# Patient Record
Sex: FEMALE | Race: WHITE | NOT HISPANIC OR LATINO | Employment: OTHER | URBAN - METROPOLITAN AREA
[De-identification: names, ages, dates, MRNs, and addresses within clinical notes are randomized per-mention and may not be internally consistent; named-entity substitution may affect disease eponyms.]

---

## 2023-02-03 PROBLEM — Z00.00 ROUTINE ADULT HEALTH MAINTENANCE: Status: ACTIVE | Noted: 2023-02-03

## 2023-02-03 PROBLEM — M25.542 PAIN OF JOINT OF BOTH HANDS: Status: ACTIVE | Noted: 2023-02-03

## 2023-02-03 PROBLEM — Q45.3 ABNORMALITY OF PANCREATIC DUCT: Status: ACTIVE | Noted: 2023-02-03

## 2023-02-03 PROBLEM — L71.9 ROSACEA: Status: ACTIVE | Noted: 2023-02-03

## 2023-02-03 PROBLEM — Z12.39 SCREENING FOR BREAST CANCER: Status: ACTIVE | Noted: 2023-02-03

## 2023-02-03 PROBLEM — Z13.820 SCREENING FOR OSTEOPOROSIS: Status: ACTIVE | Noted: 2023-02-03

## 2023-02-03 PROBLEM — Z71.89 CARDIAC RISK COUNSELING: Status: ACTIVE | Noted: 2023-02-03

## 2023-02-03 PROBLEM — E78.2 HYPERLIPIDEMIA, MIXED: Status: ACTIVE | Noted: 2023-02-03

## 2023-02-03 PROBLEM — R91.1 LUNG NODULE: Status: ACTIVE | Noted: 2023-02-03

## 2023-02-03 PROBLEM — Z01.419 ENCOUNTER FOR ROUTINE GYNECOLOGIC EXAMINATION IN MEDICARE PATIENT: Status: ACTIVE | Noted: 2023-02-03

## 2023-02-03 PROBLEM — M79.641 PAIN OF RIGHT HAND: Status: ACTIVE | Noted: 2023-02-03

## 2023-02-03 PROBLEM — Z87.81 H/O FRACTURE OF RIB: Status: ACTIVE | Noted: 2023-02-03

## 2023-02-03 PROBLEM — F41.9 ANXIETY: Status: ACTIVE | Noted: 2023-02-03

## 2023-02-03 PROBLEM — Z13.89 SCREENING FOR MULTIPLE CONDITIONS: Status: ACTIVE | Noted: 2023-02-03

## 2023-02-03 PROBLEM — R09.89 PULMONARY HYPERINFLATION: Status: ACTIVE | Noted: 2023-02-03

## 2023-02-03 PROBLEM — M19.90 ARTHRITIS: Status: ACTIVE | Noted: 2023-02-03

## 2023-02-03 PROBLEM — L98.9 CHANGING SKIN LESION: Status: ACTIVE | Noted: 2023-02-03

## 2023-02-03 PROBLEM — M25.50 POLYARTHRALGIA: Status: ACTIVE | Noted: 2023-02-03

## 2023-02-03 PROBLEM — M25.541 PAIN OF JOINT OF BOTH HANDS: Status: ACTIVE | Noted: 2023-02-03

## 2023-02-03 PROBLEM — M77.11 LATERAL EPICONDYLITIS OF BOTH ELBOWS: Status: ACTIVE | Noted: 2023-02-03

## 2023-02-03 PROBLEM — M79.642 PAIN OF LEFT HAND: Status: ACTIVE | Noted: 2023-02-03

## 2023-02-03 PROBLEM — E55.9 VITAMIN D DEFICIENCY: Status: ACTIVE | Noted: 2023-02-03

## 2023-02-03 PROBLEM — Z78.0 POSTMENOPAUSAL: Status: ACTIVE | Noted: 2023-02-03

## 2023-02-03 PROBLEM — F32.1 CURRENT MODERATE EPISODE OF MAJOR DEPRESSIVE DISORDER WITHOUT PRIOR EPISODE (MULTI): Status: ACTIVE | Noted: 2023-02-03

## 2023-02-03 PROBLEM — Z12.11 SCREENING FOR COLORECTAL CANCER: Status: ACTIVE | Noted: 2023-02-03

## 2023-02-03 PROBLEM — D18.03 HEPATIC HEMANGIOMA: Status: ACTIVE | Noted: 2023-02-03

## 2023-02-03 PROBLEM — Z87.42 H/O ABNORMAL CERVICAL PAPANICOLAOU SMEAR: Status: ACTIVE | Noted: 2023-02-03

## 2023-02-03 PROBLEM — B35.1 ONYCHOMYCOSIS OF TOENAIL: Status: ACTIVE | Noted: 2023-02-03

## 2023-02-03 PROBLEM — E03.9 HYPOTHYROIDISM, ADULT: Status: ACTIVE | Noted: 2023-02-03

## 2023-02-03 PROBLEM — Z12.12 SCREENING FOR COLORECTAL CANCER: Status: ACTIVE | Noted: 2023-02-03

## 2023-02-03 PROBLEM — Z71.89 ADVANCE DIRECTIVE DISCUSSED WITH PATIENT: Status: ACTIVE | Noted: 2023-02-03

## 2023-02-03 PROBLEM — M77.12 LATERAL EPICONDYLITIS OF BOTH ELBOWS: Status: ACTIVE | Noted: 2023-02-03

## 2023-02-03 PROBLEM — M85.9 DISORDER OF BONE DENSITY AND STRUCTURE, UNSPECIFIED: Status: ACTIVE | Noted: 2023-02-03

## 2023-02-03 RX ORDER — SIMVASTATIN 40 MG/1
1 TABLET, FILM COATED ORAL DAILY
COMMUNITY
Start: 2020-06-25 | End: 2024-01-17

## 2023-02-03 RX ORDER — LEVOTHYROXINE SODIUM 50 UG/1
1 TABLET ORAL DAILY
COMMUNITY
Start: 2022-03-22 | End: 2024-01-17

## 2023-02-03 RX ORDER — MELOXICAM 15 MG/1
1 TABLET ORAL DAILY
COMMUNITY
Start: 2022-03-22 | End: 2023-08-23

## 2023-02-03 RX ORDER — BUSPIRONE HYDROCHLORIDE 15 MG/1
1 TABLET ORAL 2 TIMES DAILY
COMMUNITY
Start: 2021-03-16 | End: 2023-04-07 | Stop reason: ALTCHOICE

## 2023-02-03 RX ORDER — CHOLECALCIFEROL (VITAMIN D3) 25 MCG
1 TABLET ORAL DAILY
COMMUNITY
Start: 2020-06-25

## 2023-02-03 RX ORDER — BUPROPION HYDROCHLORIDE 300 MG/1
1 TABLET ORAL DAILY
COMMUNITY
Start: 2021-03-16 | End: 2023-04-07 | Stop reason: ALTCHOICE

## 2023-02-03 RX ORDER — MULTIVIT WITH MINERALS/HERBS
1 TABLET ORAL
COMMUNITY
Start: 2022-03-22 | End: 2023-12-21 | Stop reason: SINTOL

## 2023-02-03 RX ORDER — SERTRALINE HYDROCHLORIDE 100 MG/1
100 TABLET, FILM COATED ORAL DAILY
COMMUNITY
Start: 2020-06-25 | End: 2023-05-18 | Stop reason: ALTCHOICE

## 2023-02-19 PROBLEM — Z71.89 ADVANCED DIRECTIVES, COUNSELING/DISCUSSION: Status: ACTIVE | Noted: 2023-02-19

## 2023-02-19 PROBLEM — Z01.419 ENCOUNTER FOR ROUTINE GYNECOLOGIC EXAMINATION IN MEDICARE PATIENT: Status: RESOLVED | Noted: 2023-02-03 | Resolved: 2023-02-19

## 2023-02-19 PROBLEM — M25.542 PAIN OF JOINT OF BOTH HANDS: Status: RESOLVED | Noted: 2023-02-03 | Resolved: 2023-02-19

## 2023-02-19 PROBLEM — M79.642 PAIN OF LEFT HAND: Status: RESOLVED | Noted: 2023-02-03 | Resolved: 2023-02-19

## 2023-02-19 PROBLEM — M79.641 PAIN OF RIGHT HAND: Status: RESOLVED | Noted: 2023-02-03 | Resolved: 2023-02-19

## 2023-02-19 PROBLEM — L98.9 CHANGING SKIN LESION: Status: RESOLVED | Noted: 2023-02-03 | Resolved: 2023-02-19

## 2023-02-19 PROBLEM — M25.541 PAIN OF JOINT OF BOTH HANDS: Status: RESOLVED | Noted: 2023-02-03 | Resolved: 2023-02-19

## 2023-03-28 LAB
ALANINE AMINOTRANSFERASE (SGPT) (U/L) IN SER/PLAS: 12 U/L (ref 7–45)
ALBUMIN (G/DL) IN SER/PLAS: 4.3 G/DL (ref 3.4–5)
ALKALINE PHOSPHATASE (U/L) IN SER/PLAS: 64 U/L (ref 33–136)
ANION GAP IN SER/PLAS: 9 MMOL/L (ref 10–20)
APPEARANCE, URINE: CLEAR
ASPARTATE AMINOTRANSFERASE (SGOT) (U/L) IN SER/PLAS: 18 U/L (ref 9–39)
BASOPHILS (10*3/UL) IN BLOOD BY AUTOMATED COUNT: 0.06 X10E9/L (ref 0–0.1)
BASOPHILS/100 LEUKOCYTES IN BLOOD BY AUTOMATED COUNT: 1.4 % (ref 0–2)
BILIRUBIN TOTAL (MG/DL) IN SER/PLAS: 0.5 MG/DL (ref 0–1.2)
BILIRUBIN, URINE: NEGATIVE
BLOOD, URINE: NEGATIVE
CALCIDIOL (25 OH VITAMIN D3) (NG/ML) IN SER/PLAS: 39 NG/ML
CALCIUM (MG/DL) IN SER/PLAS: 9.5 MG/DL (ref 8.6–10.3)
CARBON DIOXIDE, TOTAL (MMOL/L) IN SER/PLAS: 31 MMOL/L (ref 21–32)
CHLORIDE (MMOL/L) IN SER/PLAS: 105 MMOL/L (ref 98–107)
CHOLESTEROL (MG/DL) IN SER/PLAS: 182 MG/DL (ref 0–199)
CHOLESTEROL IN HDL (MG/DL) IN SER/PLAS: 76.8 MG/DL
CHOLESTEROL/HDL RATIO: 2.4
COLOR, URINE: YELLOW
CREATININE (MG/DL) IN SER/PLAS: 0.79 MG/DL (ref 0.5–1.05)
EOSINOPHILS (10*3/UL) IN BLOOD BY AUTOMATED COUNT: 0.09 X10E9/L (ref 0–0.7)
EOSINOPHILS/100 LEUKOCYTES IN BLOOD BY AUTOMATED COUNT: 2 % (ref 0–6)
ERYTHROCYTE DISTRIBUTION WIDTH (RATIO) BY AUTOMATED COUNT: 13.2 % (ref 11.5–14.5)
ERYTHROCYTE MEAN CORPUSCULAR HEMOGLOBIN CONCENTRATION (G/DL) BY AUTOMATED: 32.1 G/DL (ref 32–36)
ERYTHROCYTE MEAN CORPUSCULAR VOLUME (FL) BY AUTOMATED COUNT: 93 FL (ref 80–100)
ERYTHROCYTES (10*6/UL) IN BLOOD BY AUTOMATED COUNT: 4.44 X10E12/L (ref 4–5.2)
GFR FEMALE: 82 ML/MIN/1.73M2
GLUCOSE (MG/DL) IN SER/PLAS: 87 MG/DL (ref 74–99)
GLUCOSE, URINE: NEGATIVE MG/DL
HEMATOCRIT (%) IN BLOOD BY AUTOMATED COUNT: 41.4 % (ref 36–46)
HEMOGLOBIN (G/DL) IN BLOOD: 13.3 G/DL (ref 12–16)
IMMATURE GRANULOCYTES/100 LEUKOCYTES IN BLOOD BY AUTOMATED COUNT: 0.2 % (ref 0–0.9)
KETONES, URINE: NEGATIVE MG/DL
LDL: 90 MG/DL (ref 0–99)
LEUKOCYTE ESTERASE, URINE: NEGATIVE
LEUKOCYTES (10*3/UL) IN BLOOD BY AUTOMATED COUNT: 4.4 X10E9/L (ref 4.4–11.3)
LYMPHOCYTES (10*3/UL) IN BLOOD BY AUTOMATED COUNT: 2.03 X10E9/L (ref 1.2–4.8)
LYMPHOCYTES/100 LEUKOCYTES IN BLOOD BY AUTOMATED COUNT: 46.1 % (ref 13–44)
MONOCYTES (10*3/UL) IN BLOOD BY AUTOMATED COUNT: 0.33 X10E9/L (ref 0.1–1)
MONOCYTES/100 LEUKOCYTES IN BLOOD BY AUTOMATED COUNT: 7.5 % (ref 2–10)
NEUTROPHILS (10*3/UL) IN BLOOD BY AUTOMATED COUNT: 1.88 X10E9/L (ref 1.2–7.7)
NEUTROPHILS/100 LEUKOCYTES IN BLOOD BY AUTOMATED COUNT: 42.8 % (ref 40–80)
NITRITE, URINE: NEGATIVE
PH, URINE: 7 (ref 5–8)
PLATELETS (10*3/UL) IN BLOOD AUTOMATED COUNT: 159 X10E9/L (ref 150–450)
POTASSIUM (MMOL/L) IN SER/PLAS: 4 MMOL/L (ref 3.5–5.3)
PROTEIN TOTAL: 7 G/DL (ref 6.4–8.2)
PROTEIN, URINE: NEGATIVE MG/DL
SODIUM (MMOL/L) IN SER/PLAS: 141 MMOL/L (ref 136–145)
SPECIFIC GRAVITY, URINE: 1.02 (ref 1–1.03)
THYROTROPIN (MIU/L) IN SER/PLAS BY DETECTION LIMIT <= 0.05 MIU/L: 1.7 MIU/L (ref 0.44–3.98)
TRIGLYCERIDE (MG/DL) IN SER/PLAS: 76 MG/DL (ref 0–149)
UREA NITROGEN (MG/DL) IN SER/PLAS: 15 MG/DL (ref 6–23)
UROBILINOGEN, URINE: <2 MG/DL (ref 0–1.9)
VLDL: 15 MG/DL (ref 0–40)

## 2023-04-07 ENCOUNTER — OFFICE VISIT (OUTPATIENT)
Dept: PRIMARY CARE | Facility: CLINIC | Age: 68
End: 2023-04-07
Payer: COMMERCIAL

## 2023-04-07 VITALS
SYSTOLIC BLOOD PRESSURE: 115 MMHG | HEART RATE: 70 BPM | DIASTOLIC BLOOD PRESSURE: 70 MMHG | TEMPERATURE: 97.3 F | BODY MASS INDEX: 22.5 KG/M2 | WEIGHT: 127 LBS | OXYGEN SATURATION: 98 % | HEIGHT: 63 IN

## 2023-04-07 DIAGNOSIS — E55.9 VITAMIN D DEFICIENCY: ICD-10-CM

## 2023-04-07 DIAGNOSIS — L57.0 ACTINIC KERATOSIS: ICD-10-CM

## 2023-04-07 DIAGNOSIS — R91.1 LUNG NODULE: ICD-10-CM

## 2023-04-07 DIAGNOSIS — J02.9 SORE THROAT: ICD-10-CM

## 2023-04-07 DIAGNOSIS — Z12.31 ENCOUNTER FOR SCREENING MAMMOGRAM FOR BREAST CANCER: ICD-10-CM

## 2023-04-07 DIAGNOSIS — Z78.0 POSTMENOPAUSAL: ICD-10-CM

## 2023-04-07 DIAGNOSIS — Z71.89 CARDIAC RISK COUNSELING: ICD-10-CM

## 2023-04-07 DIAGNOSIS — Z12.31 ENCOUNTER FOR SCREENING MAMMOGRAM FOR MALIGNANT NEOPLASM OF BREAST: ICD-10-CM

## 2023-04-07 DIAGNOSIS — Z13.89 SCREENING FOR MULTIPLE CONDITIONS: ICD-10-CM

## 2023-04-07 DIAGNOSIS — Z00.00 ROUTINE ADULT HEALTH MAINTENANCE: Primary | ICD-10-CM

## 2023-04-07 DIAGNOSIS — E03.9 HYPOTHYROIDISM, ADULT: ICD-10-CM

## 2023-04-07 DIAGNOSIS — Z71.89 ADVANCE DIRECTIVE DISCUSSED WITH PATIENT: ICD-10-CM

## 2023-04-07 DIAGNOSIS — E78.2 HYPERLIPIDEMIA, MIXED: ICD-10-CM

## 2023-04-07 DIAGNOSIS — F32.1 CURRENT MODERATE EPISODE OF MAJOR DEPRESSIVE DISORDER WITHOUT PRIOR EPISODE (MULTI): ICD-10-CM

## 2023-04-07 PROBLEM — B35.1 ONYCHOMYCOSIS OF TOENAIL: Status: RESOLVED | Noted: 2023-02-03 | Resolved: 2023-04-07

## 2023-04-07 PROCEDURE — 1036F TOBACCO NON-USER: CPT | Performed by: INTERNAL MEDICINE

## 2023-04-07 PROCEDURE — 99397 PER PM REEVAL EST PAT 65+ YR: CPT | Performed by: INTERNAL MEDICINE

## 2023-04-07 PROCEDURE — 90677 PCV20 VACCINE IM: CPT | Performed by: INTERNAL MEDICINE

## 2023-04-07 PROCEDURE — G0444 DEPRESSION SCREEN ANNUAL: HCPCS | Performed by: INTERNAL MEDICINE

## 2023-04-07 PROCEDURE — 1160F RVW MEDS BY RX/DR IN RCRD: CPT | Performed by: INTERNAL MEDICINE

## 2023-04-07 PROCEDURE — G0446 INTENS BEHAVE THER CARDIO DX: HCPCS | Performed by: INTERNAL MEDICINE

## 2023-04-07 PROCEDURE — G0009 ADMIN PNEUMOCOCCAL VACCINE: HCPCS | Performed by: INTERNAL MEDICINE

## 2023-04-07 PROCEDURE — 99497 ADVNCD CARE PLAN 30 MIN: CPT | Performed by: INTERNAL MEDICINE

## 2023-04-07 PROCEDURE — G0439 PPPS, SUBSEQ VISIT: HCPCS | Performed by: INTERNAL MEDICINE

## 2023-04-07 PROCEDURE — 1159F MED LIST DOCD IN RCRD: CPT | Performed by: INTERNAL MEDICINE

## 2023-04-07 PROCEDURE — 99214 OFFICE O/P EST MOD 30 MIN: CPT | Performed by: INTERNAL MEDICINE

## 2023-04-07 RX ORDER — VITAMIN E MIXED 400 UNIT
CAPSULE ORAL
Qty: 30 CAPSULE | Refills: 11
Start: 2023-04-07

## 2023-04-07 RX ORDER — AMOXICILLIN AND CLAVULANATE POTASSIUM 875; 125 MG/1; MG/1
1 TABLET, FILM COATED ORAL 2 TIMES DAILY
COMMUNITY
End: 2023-04-07 | Stop reason: ALTCHOICE

## 2023-04-07 RX ORDER — BENZONATATE 100 MG/1
100 CAPSULE ORAL 3 TIMES DAILY PRN
COMMUNITY
End: 2023-05-18 | Stop reason: ALTCHOICE

## 2023-04-07 ASSESSMENT — PATIENT HEALTH QUESTIONNAIRE - PHQ9
SUM OF ALL RESPONSES TO PHQ9 QUESTIONS 1 AND 2: 0
2. FEELING DOWN, DEPRESSED OR HOPELESS: NOT AT ALL
1. LITTLE INTEREST OR PLEASURE IN DOING THINGS: NOT AT ALL

## 2023-04-07 NOTE — PROGRESS NOTES
Assessment and Plan:  Problem List Items Addressed This Visit          High    Advance directive discussed with patient    Overview     4/7/23:Forms provided again         Routine adult health maintenance - Primary    Overview     nfluenza Seasonal Inj Iptwyvatcztw60/1/2018, 10/1/20, 9/3/21, 10/24/22  Moderna: 3/19/21, 4/17/21, 12/17/21  Tdap (Age 7+)5/29/2008, 6/25/20   Pneumovax 23 9/3/21  Zostavax 7/19/2014   Shingrix 9/2020, 11/20   Mamm 2016; 9/8/20; 10/21/21, 11/15/22  BMD 7/16; 9/8/20; 11/16/22  Cscope declined in past  PAP/HPV 7/18/14, 2018 (-)         Current Assessment & Plan     Annual Wellness exam completed   Preventive Health history reviewed:  Vaccines today: PCV 20  Labs ordered    Mammogram ordered  Depression Screening done  Advanced Directives Discussion Completed  Cardiovascular risk discussed and if needed, lifestyle modifications recommended, including nutritional choices, exercise, and elimination of habits contributing to risk.  We agreed on a plan to reduce the current cardiovascular risk.  See ecalc ASCVD Risk  Plus for data discussed regarding risk and risk reduction opportunities.  Aspirin use/disuse was discussed after reviewing the updated guidelines.            Relevant Orders    Pneumococcal conjugate vaccine, 20-valent, adult (PREVNAR 20) (Completed)    Screening for multiple conditions    Overview     Depression screening completed              Medium    Cardiac risk counseling    Overview     4/7/23: Current 10-Year ASCVD Risk:  4.8% - Borderline Risk    ASA not rec'd         Current moderate episode of major depressive disorder without prior episode (CMS/[facility])    Overview     s/p  hospitalization 2019  at the time: GAF: 55 -60-51 Moderate symptoms or moderate difficulty in social, occupational or school functioning  Weaned of Paxil at that time. Failed Pamelor.    Stopped Buspar and Wellbutrin in 2022  (Stopped Seroquel in 2021)  On Zoloft  Managed by Psychiatry,  controlled well now.         Relevant Orders    Comprehensive Metabolic Panel    CBC and Auto Differential    Encounter for screening mammogram for breast cancer    Relevant Orders    BI mammo bilateral screening tomosynthesis    Hyperlipidemia, mixed    Overview     Goal LDL <130  ON statin and fish oil         Relevant Orders    Urinalysis with Reflex Microscopic    Comprehensive Metabolic Panel    CBC and Auto Differential    Lipid Panel    Hypothyroidism, adult    Overview     Started Thyroid medication 2022  labs monitored         Relevant Orders    Urinalysis with Reflex Microscopic    TSH with reflex to Free T4 if abnormal    Comprehensive Metabolic Panel    CBC and Auto Differential    Lung nodule    Overview     CT 8/20: POTENTIAL BUT NOT DEFINITIVE 4 MM SUBPLEURAL LEFT LOWER LOBE NONCALCIFIED NODULE VERSUS ATELECTASIS/SCAR. ACCORDING TO 2017 REVISION LUNG NODULE FOLLOW-UP GUIDELINES, EVEN IF THIS IS A HIGH  RISK PATIENT (SMOKING OR MALIGNANCY HISTORY), FOLLOW-UP CHEST CT IN ONE YEAR WOULD BE OPTIONAL  3/2021: stable 3-4mm nodules; 12 month surveillance rec         Relevant Orders    CT chest wo IV contrast    Postmenopausal    Overview     Symptomatic  Vit E started 4/23         Current Assessment & Plan     If ineffective wouold consider low dose Gabapentin at HS         Relevant Medications    vitamin E 180 mg (400 unit) capsule    Screening for breast cancer    Vitamin D deficiency    Overview     Goal 40-50  On supplement         Relevant Orders    Vitamin D, Total     Other Visit Diagnoses       Actinic keratosis        Changing skin lesion  Will refer to Derm    Relevant Orders    Referral to Dermatology    Sore throat        Suspect irritation from URI  Cepacol lozenges OTC  If sx persist consider Diflucan (recent abx/steroids)            Chief Complaint:   Medicare Wellness Exam/Comprehensive Problem Focused Follow Up and Physical Exam    HPI: medicare wellness  Went to  6 days ago, symptoms  started 8 days ago suddenly  Was starting to feel better then yesterday has ST, very bad  On abx and steroids- Augmentin   + trouble swallowing    Has gained weight  Started on Synthroid last year  No change in diet  Added mobic for DJD, takes prn  Increased zocor last year  Stopped Buspar and Wellbutrin   Doesn't watch calories   Watches twin grandkids  Not exercising  Sleeps OK    Age spot on her face is changing  Dry and scaly    Still gets night sweats    Labs reviewed:  Component      Latest Ref Rng 3/28/2023   WBC      4.4 - 11.3 x10E9/L 4.4    RBC      4.00 - 5.20 x10E12/L 4.44    HEMOGLOBIN      12.0 - 16.0 g/dL 13.3    HEMATOCRIT      36.0 - 46.0 % 41.4    MCV      80 - 100 fL 93    MCHC      32.0 - 36.0 g/dL 32.1    Platelets      150 - 450 x10E9/L 159    RED CELL DISTRIBUTION WIDTH      11.5 - 14.5 % 13.2    Neutrophils %      40.0 - 80.0 % 42.8    Immature Granulocytes %, Automated      0.0 - 0.9 % 0.2    Lymphocytes %      13.0 - 44.0 % 46.1    Monocytes %      2.0 - 10.0 % 7.5    Eosinophils %      0.0 - 6.0 % 2.0    Basophils %      0.0 - 2.0 % 1.4    Neutrophils Absolute      1.20 - 7.70 x10E9/L 1.88    Lymphocytes Absolute      1.20 - 4.80 x10E9/L 2.03    Monocytes Absolute      0.10 - 1.00 x10E9/L 0.33    Eosinophils Absolute      0.00 - 0.70 x10E9/L 0.09    Basophils Absolute      0.00 - 0.10 x10E9/L 0.06    GLUCOSE      74 - 99 mg/dL 87    SODIUM      136 - 145 mmol/L 141    POTASSIUM      3.5 - 5.3 mmol/L 4.0    CHLORIDE      98 - 107 mmol/L 105    Bicarbonate      21 - 32 mmol/L 31    Anion Gap      10 - 20 mmol/L 9 (L)    Blood Urea Nitrogen      6 - 23 mg/dL 15    Creatinine      0.50 - 1.05 mg/dL 0.79    GFR Female      >90 mL/min/1.73m2 82    Calcium      8.6 - 10.3 mg/dL 9.5    Albumin      3.4 - 5.0 g/dL 4.3    Alkaline Phosphatase      33 - 136 U/L 64    Total Protein      6.4 - 8.2 g/dL 7.0    AST      9 - 39 U/L 18    Bilirubin Total      0.0 - 1.2 mg/dL 0.5    ALT      7 - 45 U/L 12     Color, Urine      STRAW,YELLOW  YELLOW    Appearance, Urine      CLEAR  CLEAR    Specific Gravity, Urine      1.005 - 1.035  1.016    pH, Urine      5.0 - 8.0  7.0    Protein, Urine      NEGATIVE mg/dL NEGATIVE    Glucose, Urine      NEGATIVE mg/dL NEGATIVE    Blood, Urine      NEGATIVE  NEGATIVE    Ketones, Urine      NEGATIVE mg/dL NEGATIVE    Bilirubin, Urine      NEGATIVE  NEGATIVE    Urobilinogen, Urine      0.0 - 1.9 mg/dL <2.0    Nitrite, Urine      NEGATIVE  NEGATIVE    Leukocyte Esterase, Urine      NEGATIVE  NEGATIVE    CHOLESTEROL      0 - 199 mg/dL 182    HDL CHOLESTEROL      mg/dL 76.8    Cholesterol/HDL Ratio 2.4    LDL      0 - 99 mg/dL 90    VLDL      0 - 40 mg/dL 15    TRIGLYCERIDES      0 - 149 mg/dL 76    Vitamin D, 25-Hydroxy, Total      ng/mL 39    Thyroid Stimulating Hormone      0.44 - 3.98 mIU/L 1.70           Patient Care Team:  Kathryn Kang MD as PCP - General  Kathryn Kang MD as PCP - [employer] PCP   Active Problem List  Patient Active Problem List   Diagnosis    Abnormality of pancreatic duct    Anxiety    Arthritis    Current moderate episode of major depressive disorder without prior episode (CMS/HCC)    Disorder of bone density and structure, unspecified    Hepatic hemangioma    Hyperlipidemia, mixed    Hypothyroidism, adult    Lung nodule    Pulmonary hyperinflation    Rosacea    Vitamin D deficiency    Advance directive discussed with patient    Cardiac risk counseling    H/O abnormal cervical Papanicolaou smear    H/O fracture of rib    Postmenopausal    Routine adult health maintenance    Screening for breast cancer    Screening for colorectal cancer    Screening for osteoporosis    Screening for multiple conditions    Advanced directives, counseling/discussion    Encounter for screening mammogram for breast cancer         Comprehensive Medical/Surgical/Social/Family History  Past Medical History:   Diagnosis Date    H/O bone density study     11/16/22: Lowest  T score -1.5 10-Year Fracture Risk: Major Osteoporotic Fracture 13.4% Hip Fracture                1.7% : -1.5 : -1.6, FRAX: Major Osteoporotic Fracture: 13.3%                             Hip Fracture:                1.6%    H/O CT scan     : CT calcium score=0 POTENTIAL BUT NOT DEFINITIVE 4 MM SUBPLEURAL LEFT LOWER LOBE NONCALCIFIED NODULE VERSUS ATELECTASIS/SCAR. ACCORDING TO 2017 REVISION LUNG NODULE FOLLOW-UP GUIDELINES, EVEN IF THIS IS A HIGH RISK PATIENT (SMOKING OR MALIGNANCY HISTORY), FOLLOW-UP CHEST CT IN ONE YEAR WOULD BE OPTIONAL    H/O CT scan of chest     3/2021: 1. Stable pulmonary (3-4mm)nodules as above. Per patient risk factors, 12 month follow-up could be obtained as clinically indicated.Pulmonary hyperinflation with emphysematous changes and pleuroparenchymal scarring. Stable prominence of the main pancreatic duct. (4mm), compared to CT . Subacute left-sided rib fractures with bony callus formation. Indeterminate hepatic lesions, 0.8-1cm.    H/O diagnostic ultrasound     US RUQ: 10/21: 1. Right hepatic lobe lesion measuring up to 0.8 x 0.7cm x 0.7cm cm with imaging characteristics suggestive of a benign hemangioma. 2. Previously noted left hepatic lobe lesion is not visualized in the present examination. 3. Poor visualization of the pancreas.     Past Surgical History:   Procedure Laterality Date    CERVICAL BIOPSY  W/ LOOP ELECTRODE EXCISION       SECTION, CLASSIC      ESOPHAGOGASTRODUODENOSCOPY      HERNIA REPAIR       Social History     Social History Narrative        2 kids    Not working    Nonsmoker, Social ETOH    --    Family History:    M: Pancreatic CA , OP ( age 88)    F: CHF ( age 91)    S: thyroid    B:    B: Obesity, COPD,IPF?  Psychiatric issues/OCD     Tobacco/Alcohol/Opioid use, as well as Illicit Drug Use was screened for/reviewed and documented in Social Documentation section of the chart and medication list as appropriate    Allergies and  "Medications  Sulfa (sulfonamide antibiotics)  Current Outpatient Medications   Medication Instructions    benzonatate (TESSALON) 100 mg, oral, 3 times daily PRN, Do not crush or chew.     cholecalciferol (Vitamin D-3) 25 MCG (1000 UT) tablet 1 tablet, oral, Daily    levothyroxine (Synthroid, Levoxyl) 50 mcg tablet 1 tablet, oral, Daily    meloxicam (Mobic) 15 mg tablet 1 tablet, oral, Daily    sertraline (ZOLOFT) 100 mg, oral, Daily    simvastatin (Zocor) 40 mg tablet 1 tablet, oral, Daily    vitamin B complex tablet 1 tablet, oral, Daily before breakfast    vitamin E 180 mg (400 unit) capsule 400-800 units daily     Medications and Supplements  prescribed by me and other practitioners or clinical pharmacist (such as prescriptions, OTC's, herbal therapies and supplements) were reviewed and documented in the medical record.      Activities of Daily Living  In your present state of health, do you have any difficulty performing the following activities?:   Preparing food and eating?: No  Bathing yourself: No  Getting dressed: No  Using the toilet:No  Moving around from place to place: No  In the past year have you fallen or had a near fall?:No  Able to manage finances independently: Yes  Able to perform grocery shopping: Yes  Able to manage medications independently: Yes  Able to do housework independently: Yes  Patient self-assessment of health status? Good    Depression Screen  (Note: if answer to either of the following is \"Yes\", then a more complete depression screening is indicated)   Q1: Over the past two weeks, have you felt down, depressed or hopeless? No  Q2: Over the past two weeks, have you felt little interest or pleasure in doing things? No    Current exercise habits: daily exercise  Dietary issues discussed: Yes  Hearing difficulties: No  Safe in current home environment: Yes  Visual Acuity assessed: No  Cognitive Impairment No    Advance directives  Advanced Care Planning (including a Living Will, " "Healthcare POA, as well as specific end of life choices and/or directives), was discussed for approximately 16 minutes with the patient and/or surrogate, voluntarily, and documented in the Problem List of the medical record.  yes    Cardiac Risk Assessment  Cardiovascular risk was discussed and, if needed, lifestyle modifications recommended, including nutritional choices, exercise, and elimination of habits contributing to risk. We agreed on a plan to reduce the current cardiovascular risk based on above discussion as needed.  Aspirin use/disuse was discussed and documented in the Problem List of the medical record after reviewing the updated guidelines below:    Consider low dose Aspirin ( mg) use if the benefit for cardiovascular disease prevention outweighs risk for bleeding complications.   In general, low dose ASA should be considered:  In patients WITHOUT prior MI/stroke/PAD (primary prevention):   a. Age <60: Use if 10-year cardiovascular disease risk >20%, with discussion of risks and benefits with patient  b. Age 60-<70: Use if 10-year cardiovascular disease risk >20% and low bleeding (e.g., gastrointenstinal) risk, with discussion of risks and benefits with patient  c. Age >=70: Do not use    In patients WITH prior MI/stroke/PAD (secondary prevention):   Generally use unless extremely high bleeding (e.g., gastrointenstinal) risk, with discussion of risks and benefits with patient    ROS otherwise negative aside from what was mentioned above in HPI.    Vitals  /70   Pulse 70   Temp 36.3 °C (97.3 °F)   Ht 1.594 m (5' 2.75\")   Wt 57.6 kg (127 lb) Comment: keep  SpO2 98%   BMI 22.68 kg/m²   Body mass index is 22.68 kg/m².  Physical Exam  Gen: Alert, NAD  HEENT:  Unremarkable, no thrush or plaque lesions notes, mildly erythematous  Neck:  No ELA  Respiratory:  Lungs CTAB  Cardiovascular:  Heart RRR  Neuro:  Gross motor and sensory intact  Skin:  No suspicious lesions present; the left " temporal skin lesion looks benign but is changed  Breast: No masses, or axillary lymphadenopathy  Gyn: Normal pelvic exam: no uterine masses or cervical lesions, or CMT; no vaginal D/C. No ovarian or adnexal masses; No external vaginal or anal/perineal lesions (Pt declined chaperone)    During the course of the visit the patient was educated and counseled about age appropriate screening and preventive services. Completed preventive screenings were documented in the chart and orders were placed for outstanding screenings/procedures as documented in the Assessment and Plan.    Patient Instructions (the written plan) was given to the patient at check out.

## 2023-04-07 NOTE — ASSESSMENT & PLAN NOTE
Annual Wellness exam completed   Preventive Health history reviewed:  Vaccines today: PCV 20  Labs ordered    Mammogram ordered  Depression Screening done  Advanced Directives Discussion Completed  Cardiovascular risk discussed and if needed, lifestyle modifications recommended, including nutritional choices, exercise, and elimination of habits contributing to risk.  We agreed on a plan to reduce the current cardiovascular risk.  See ecalc ASCVD Risk  Plus for data discussed regarding risk and risk reduction opportunities.  Aspirin use/disuse was discussed after reviewing the updated guidelines.

## 2023-04-18 ENCOUNTER — TELEPHONE (OUTPATIENT)
Dept: PRIMARY CARE | Facility: CLINIC | Age: 68
End: 2023-04-18

## 2023-04-18 ENCOUNTER — TELEMEDICINE (OUTPATIENT)
Dept: PRIMARY CARE | Facility: CLINIC | Age: 68
End: 2023-04-18
Payer: COMMERCIAL

## 2023-04-18 DIAGNOSIS — J01.00 ACUTE MAXILLARY SINUSITIS, RECURRENCE NOT SPECIFIED: Primary | ICD-10-CM

## 2023-04-18 PROCEDURE — 99213 OFFICE O/P EST LOW 20 MIN: CPT | Performed by: NURSE PRACTITIONER

## 2023-04-18 RX ORDER — CODEINE PHOSPHATE AND GUAIFENESIN 10; 100 MG/5ML; MG/5ML
10 SOLUTION ORAL EVERY 6 HOURS PRN
Qty: 400 ML | Refills: 0 | Status: SHIPPED | OUTPATIENT
Start: 2023-04-18 | End: 2023-04-28

## 2023-04-18 RX ORDER — FLUTICASONE PROPIONATE 50 MCG
1-2 SPRAY, SUSPENSION (ML) NASAL DAILY
Qty: 16 G | Refills: 5 | Status: SHIPPED | OUTPATIENT
Start: 2023-04-18 | End: 2023-10-31 | Stop reason: SDUPTHER

## 2023-04-18 RX ORDER — DOXYCYCLINE 100 MG/1
100 CAPSULE ORAL 2 TIMES DAILY
Qty: 14 CAPSULE | Refills: 0 | Status: SHIPPED | OUTPATIENT
Start: 2023-04-18 | End: 2023-04-25

## 2023-04-18 ASSESSMENT — ENCOUNTER SYMPTOMS
FEVER: 0
RHINORRHEA: 1
WEIGHT LOSS: 0
SHORTNESS OF BREATH: 1
COUGH: 1
HEADACHES: 1
SWEATS: 1
CHILLS: 1
HEARTBURN: 0
MYALGIAS: 1
WHEEZING: 0
SORE THROAT: 0
HEMOPTYSIS: 0

## 2023-04-18 NOTE — TELEPHONE ENCOUNTER
Can add on right now before next patient  VV  Or schedule with NP today or tomorrow  I have to leave at 4pm today

## 2023-04-18 NOTE — PROGRESS NOTES
An interactive audio and video telecommunication system which permits real time communications between the patient (at the originating site) and provider (at the distant site) was utilized to provide this telehealth service.  Verbal consent was requested and obtained from the patient for this telehealth visit.     Subjective   Patient ID: Elena Bueno is a 68 y.o. female who presents for URI.  Cough started a little over 2 weeks ago  Went to    Strep neg  At home covid neg  Was placed on augmentin  Took it for 7-10 days  Was on benzonatate  - ineffective  And steroid  Sore throat finally went away  Cant kick cough  Still congested  Mucousy  HA that cant get rid of  Body aches  Freezing cold  Not as much respiratory symptoms  But in the head    Cough  This is a recurrent problem. The current episode started 1 to 4 weeks ago. The problem has been waxing and waning. The problem occurs every few minutes. The cough is Productive of sputum. Associated symptoms include chills, headaches, myalgias, nasal congestion, postnasal drip, rhinorrhea, shortness of breath and sweats. Pertinent negatives include no chest pain, ear congestion, ear pain, fever, heartburn, hemoptysis, rash, sore throat, weight loss or wheezing.       Review of Systems   Constitutional:  Positive for chills. Negative for fever and weight loss.   HENT:  Positive for postnasal drip and rhinorrhea. Negative for ear pain and sore throat.    Respiratory:  Positive for cough and shortness of breath. Negative for hemoptysis and wheezing.    Cardiovascular:  Negative for chest pain.   Gastrointestinal:  Negative for heartburn.   Musculoskeletal:  Positive for myalgias.   Skin:  Negative for rash.   Neurological:  Positive for headaches.       Objective   Physical Exam  Vitals and nursing note reviewed.   Constitutional:       Appearance: Normal appearance.   HENT:      Head: Normocephalic and atraumatic.      Right Ear: External ear normal.      Left Ear:  External ear normal.      Nose: Nose normal.      Mouth/Throat:      Mouth: Mucous membranes are moist.   Eyes:      Extraocular Movements: Extraocular movements intact.      Conjunctiva/sclera: Conjunctivae normal.   Pulmonary:      Effort: Pulmonary effort is normal.      Comments: Frequent coughing  Musculoskeletal:      Cervical back: Normal range of motion and neck supple.   Neurological:      General: No focal deficit present.      Mental Status: She is alert and oriented to person, place, and time. Mental status is at baseline.   Psychiatric:         Mood and Affect: Mood normal.         Behavior: Behavior normal.         Thought Content: Thought content normal.         Judgment: Judgment normal.         There were no vitals taken for this visit.    Assessment/Plan   Problem List Items Addressed This Visit    None  Visit Diagnoses       Acute maxillary sinusitis, recurrence not specified    -  Primary    Relevant Medications    doxycycline (Vibramycin) 100 mg capsule    fluticasone (Flonase) 50 mcg/actuation nasal spray    codeine-guaifenesin (Robitussin-AC)  mg/5 mL syrup

## 2023-04-18 NOTE — TELEPHONE ENCOUNTER
Patient calling   Went to urgent care x 10 + days ago   Tx with amoxicillin , neg for strep at urgent care   Finished abx  Sore throat is gone  Still has a lot of phlegm   Nasal congestion   Post nasal drip   Cough that she cant get rid of   Headache   Body aches   Covid negative when her symptoms first starts 2 weeks ago   Denies fever   Does has some mild SOB     Add on ? VV? Please advise

## 2023-05-15 ENCOUNTER — TELEPHONE (OUTPATIENT)
Dept: PRIMARY CARE | Facility: CLINIC | Age: 68
End: 2023-05-15
Payer: MEDICARE

## 2023-05-15 NOTE — TELEPHONE ENCOUNTER
Patient calling has persistent cough x 6 weeks ago   Took a couple abx   Still has burning in chest and cough   Saw  for telemed visit a month ago  Please advise

## 2023-05-18 ENCOUNTER — OFFICE VISIT (OUTPATIENT)
Dept: PRIMARY CARE | Facility: CLINIC | Age: 68
End: 2023-05-18
Payer: COMMERCIAL

## 2023-05-18 VITALS
SYSTOLIC BLOOD PRESSURE: 117 MMHG | TEMPERATURE: 97.3 F | WEIGHT: 125.38 LBS | HEART RATE: 81 BPM | DIASTOLIC BLOOD PRESSURE: 71 MMHG | OXYGEN SATURATION: 98 % | BODY MASS INDEX: 22.39 KG/M2

## 2023-05-18 DIAGNOSIS — R05.3 PERSISTENT COUGH FOR 3 WEEKS OR LONGER: Primary | ICD-10-CM

## 2023-05-18 PROCEDURE — 1036F TOBACCO NON-USER: CPT | Performed by: NURSE PRACTITIONER

## 2023-05-18 PROCEDURE — 1159F MED LIST DOCD IN RCRD: CPT | Performed by: NURSE PRACTITIONER

## 2023-05-18 PROCEDURE — 1160F RVW MEDS BY RX/DR IN RCRD: CPT | Performed by: NURSE PRACTITIONER

## 2023-05-18 PROCEDURE — 99213 OFFICE O/P EST LOW 20 MIN: CPT | Performed by: NURSE PRACTITIONER

## 2023-05-18 ASSESSMENT — ENCOUNTER SYMPTOMS
WEIGHT LOSS: 0
FEVER: 0
MYALGIAS: 0
HEADACHES: 0
SWEATS: 0
WHEEZING: 0
CHILLS: 0
SHORTNESS OF BREATH: 0
COUGH: 1
SORE THROAT: 1
HEMOPTYSIS: 0
HEARTBURN: 0
RHINORRHEA: 0

## 2023-05-18 NOTE — PATIENT INSTRUCTIONS
Chest XR today STAT  Mucinex twice a day  Flonase twice a day   Can use Ayr saline gel during the day  Zyrtec or allegra can also be added in   Follow-up with our office if your symptoms don't improve or if you worsen.

## 2023-05-18 NOTE — PROGRESS NOTES
Assessment/Plan   Problem List Items Addressed This Visit    None  Visit Diagnoses       Persistent cough for 3 weeks or longer    -  Primary    s/p augmentin & doxy  chest XR today  conservative mgt mucinex, flonase, can add in zyrtec or allegra  declines tessalon; has cough syrup RX   prn fu    Relevant Orders    XR chest 2 views            Subjective   Patient ID: Elena Bueno is a 68 y.o. female who presents for Cough and Nasal Congestion (Treated for sinusitis w/ antibiotics over a course of 17 days/Better but cannot get rid of cough or drippy nose).  Cough  This is a recurrent problem. The current episode started more than 1 month ago. The problem has been gradually improving. The problem occurs every few hours. The cough is Non-productive. Associated symptoms include nasal congestion, postnasal drip and a sore throat. Pertinent negatives include no chest pain, chills, ear congestion, ear pain, fever, headaches, heartburn, hemoptysis, myalgias, rash, rhinorrhea, shortness of breath, sweats, weight loss or wheezing. The symptoms are aggravated by lying down.     [facility] 6 weeks ago  - sinusitis  - augmentin   Dr Kang had her complete abx course  Didn't fully resolved  4/18 saw Megan  - doxcallum cleared up sore throat  Cough and PND persisting  No recent fever  No SOB or audible wheeze  R ear pain  Maybe sore back of her throat  No rash   Flonase at least HS  No other OTCs    Review of Systems   Constitutional:  Negative for chills, fever and weight loss.   HENT:  Positive for postnasal drip and sore throat. Negative for ear pain and rhinorrhea.    Respiratory:  Positive for cough. Negative for hemoptysis, shortness of breath and wheezing.    Cardiovascular:  Negative for chest pain.   Gastrointestinal:  Negative for heartburn.   Musculoskeletal:  Negative for myalgias.   Skin:  Negative for rash.   Neurological:  Negative for headaches.   All other systems reviewed and are negative.      BP Readings from Last 3  "Encounters:   05/18/23 117/71   04/07/23 115/70   03/22/22 121/82      Wt Readings from Last 3 Encounters:   05/18/23 56.9 kg (125 lb 6 oz)   04/07/23 57.6 kg (127 lb)   03/22/22 52.6 kg (116 lb)      BMI:   Estimated body mass index is 22.39 kg/m² as calculated from the following:    Height as of 4/7/23: 1.594 m (5' 2.75\").    Weight as of this encounter: 56.9 kg (125 lb 6 oz).    Objective   Physical Exam  Constitutional:       General: She is not in acute distress.  HENT:      Head: Normocephalic and atraumatic.      Right Ear: Tympanic membrane and external ear normal.      Left Ear: Tympanic membrane and external ear normal.      Nose: Nose normal.      Mouth/Throat:      Mouth: Mucous membranes are moist.   Eyes:      Extraocular Movements: Extraocular movements intact.      Conjunctiva/sclera: Conjunctivae normal.   Cardiovascular:      Rate and Rhythm: Normal rate and regular rhythm.      Pulses: Normal pulses.   Pulmonary:      Effort: Pulmonary effort is normal.      Breath sounds: Normal breath sounds.   Musculoskeletal:         General: Normal range of motion.      Cervical back: Normal range of motion and neck supple.   Skin:     General: Skin is warm and dry.   Neurological:      General: No focal deficit present.      Mental Status: She is alert.   Psychiatric:         Mood and Affect: Mood normal.       "

## 2023-08-23 DIAGNOSIS — M19.90 ARTHRITIS: Primary | ICD-10-CM

## 2023-08-23 RX ORDER — MELOXICAM 15 MG/1
15 TABLET ORAL DAILY
Qty: 90 TABLET | Refills: 3 | Status: SHIPPED | OUTPATIENT
Start: 2023-08-23

## 2023-09-02 PROBLEM — S63.529A: Status: ACTIVE | Noted: 2023-09-02

## 2023-09-02 PROBLEM — J04.0 LARYNGITIS: Status: ACTIVE | Noted: 2023-09-02

## 2023-09-02 PROBLEM — M77.12 LATERAL EPICONDYLITIS OF BOTH ELBOWS: Status: ACTIVE | Noted: 2023-09-02

## 2023-09-02 PROBLEM — L57.0 ACTINIC KERATOSIS: Status: ACTIVE | Noted: 2023-09-02

## 2023-09-02 PROBLEM — F41.1 GENERALIZED ANXIETY DISORDER: Status: ACTIVE | Noted: 2023-09-02

## 2023-09-02 PROBLEM — S63.599A: Status: ACTIVE | Noted: 2023-09-02

## 2023-09-02 PROBLEM — M77.11 LATERAL EPICONDYLITIS OF BOTH ELBOWS: Status: ACTIVE | Noted: 2023-09-02

## 2023-09-02 PROBLEM — R93.5 ABNORMAL ULTRASOUND OF ABDOMEN: Status: ACTIVE | Noted: 2022-11-15

## 2023-09-02 PROBLEM — N28.9 RENAL INSUFFICIENCY: Status: ACTIVE | Noted: 2023-09-02

## 2023-09-02 PROBLEM — M25.50 ARTHRALGIA OF MULTIPLE JOINTS: Status: ACTIVE | Noted: 2023-09-02

## 2023-09-02 PROBLEM — F32.9 MDD (MAJOR DEPRESSIVE DISORDER): Status: ACTIVE | Noted: 2019-01-08

## 2023-09-02 PROBLEM — K22.10 ESOPHAGEAL ULCER: Status: ACTIVE | Noted: 2023-09-02

## 2023-09-02 PROBLEM — M25.50 POLYARTHRALGIA: Status: ACTIVE | Noted: 2023-09-02

## 2023-09-02 PROBLEM — K76.9 LESION OF LIVER: Status: ACTIVE | Noted: 2023-09-02

## 2023-09-02 PROBLEM — B97.7 HPV IN FEMALE: Status: ACTIVE | Noted: 2023-09-02

## 2023-09-02 PROBLEM — Z20.822 SUSPECTED SEVERE ACUTE RESPIRATORY SYNDROME CORONAVIRUS 2 (SARS-COV-2) INFECTION: Status: ACTIVE | Noted: 2023-09-02

## 2023-09-02 PROBLEM — R73.01 IMPAIRED FASTING GLUCOSE: Status: ACTIVE | Noted: 2023-09-02

## 2023-09-02 RX ORDER — PREDNISONE 20 MG/1
1 TABLET ORAL 2 TIMES DAILY
COMMUNITY
Start: 2023-04-02 | End: 2023-10-31 | Stop reason: ALTCHOICE

## 2023-09-02 RX ORDER — BENZONATATE 100 MG/1
100 CAPSULE ORAL 3 TIMES DAILY PRN
COMMUNITY
Start: 2023-04-02 | End: 2023-10-31 | Stop reason: ALTCHOICE

## 2023-09-02 RX ORDER — BUPROPION HYDROCHLORIDE 300 MG/1
300 TABLET ORAL DAILY
COMMUNITY
End: 2023-10-31 | Stop reason: WASHOUT

## 2023-09-02 RX ORDER — SERTRALINE HYDROCHLORIDE 100 MG/1
100 TABLET, FILM COATED ORAL 2 TIMES DAILY
COMMUNITY
Start: 2020-06-25 | End: 2023-10-31 | Stop reason: WASHOUT

## 2023-09-02 RX ORDER — BUSPIRONE HYDROCHLORIDE 15 MG/1
1 TABLET ORAL 2 TIMES DAILY
COMMUNITY
Start: 2021-03-16 | End: 2023-10-31 | Stop reason: WASHOUT

## 2023-09-02 RX ORDER — GLUCOSAMINE/MSM/CHONDROIT SULF 500-166.6
TABLET ORAL DAILY
COMMUNITY
Start: 2022-03-22 | End: 2023-10-31 | Stop reason: WASHOUT

## 2023-10-10 ENCOUNTER — APPOINTMENT (OUTPATIENT)
Dept: PODIATRY | Facility: CLINIC | Age: 68
End: 2023-10-10
Payer: MEDICARE

## 2023-10-12 ENCOUNTER — OFFICE VISIT (OUTPATIENT)
Dept: ORTHOPEDIC SURGERY | Facility: CLINIC | Age: 68
End: 2023-10-12
Payer: MEDICARE

## 2023-10-12 DIAGNOSIS — M19.049 HAND ARTHRITIS: Primary | ICD-10-CM

## 2023-10-12 PROCEDURE — 99213 OFFICE O/P EST LOW 20 MIN: CPT | Performed by: ORTHOPAEDIC SURGERY

## 2023-10-12 PROCEDURE — 1125F AMNT PAIN NOTED PAIN PRSNT: CPT | Performed by: ORTHOPAEDIC SURGERY

## 2023-10-12 PROCEDURE — 1160F RVW MEDS BY RX/DR IN RCRD: CPT | Performed by: ORTHOPAEDIC SURGERY

## 2023-10-12 PROCEDURE — 1159F MED LIST DOCD IN RCRD: CPT | Performed by: ORTHOPAEDIC SURGERY

## 2023-10-12 PROCEDURE — 1036F TOBACCO NON-USER: CPT | Performed by: ORTHOPAEDIC SURGERY

## 2023-10-12 NOTE — PROGRESS NOTES
History present illness: Patient presents today for evaluation of bilateral hand and wrist pain.  Today she is having no pain.        Physical examination:  General: Alert and oriented to person, place, and time.  No acute distress and breathing comfortably: Pleasant and cooperative with examination.  Extremities: Evaluation of the bilateral upper extremities finds the patient had palpable radial artery at the wrists with brisk capillary refill to all digits.  Patient has intact sensation to axillary radial median and ulnar nerves.  There are no open wounds.  There are no signs of infection.  There is no evidence of lymphedema or lymphatic streaking.  The patient has supple compartments to bilateral arm forearm and hand.      Diagnostic studies:      Assessment: Intermittent pain affecting bilateral hands and wrists currently asymptomatic      Plan: Treatment options were discussed.  Recommendations were made for simple observation at this point.  Patient is going to go on a sustained dosing strategy for meloxicam and have that followed by her PCP.  Plan for follow-up with me on an as-needed basis.      Procedure:        Procedure:

## 2023-10-31 ENCOUNTER — OFFICE VISIT (OUTPATIENT)
Dept: PRIMARY CARE | Facility: CLINIC | Age: 68
End: 2023-10-31
Payer: MEDICARE

## 2023-10-31 VITALS
SYSTOLIC BLOOD PRESSURE: 124 MMHG | DIASTOLIC BLOOD PRESSURE: 83 MMHG | BODY MASS INDEX: 23.85 KG/M2 | HEART RATE: 84 BPM | WEIGHT: 129.6 LBS | OXYGEN SATURATION: 97 % | TEMPERATURE: 97.9 F | HEIGHT: 62 IN

## 2023-10-31 DIAGNOSIS — Z23 ENCOUNTER FOR IMMUNIZATION: ICD-10-CM

## 2023-10-31 DIAGNOSIS — J01.00 ACUTE MAXILLARY SINUSITIS, RECURRENCE NOT SPECIFIED: Primary | ICD-10-CM

## 2023-10-31 PROCEDURE — G0008 ADMIN INFLUENZA VIRUS VAC: HCPCS | Performed by: NURSE PRACTITIONER

## 2023-10-31 PROCEDURE — 1036F TOBACCO NON-USER: CPT | Performed by: NURSE PRACTITIONER

## 2023-10-31 PROCEDURE — 90662 IIV NO PRSV INCREASED AG IM: CPT | Performed by: NURSE PRACTITIONER

## 2023-10-31 PROCEDURE — 1160F RVW MEDS BY RX/DR IN RCRD: CPT | Performed by: NURSE PRACTITIONER

## 2023-10-31 PROCEDURE — 1159F MED LIST DOCD IN RCRD: CPT | Performed by: NURSE PRACTITIONER

## 2023-10-31 PROCEDURE — 1125F AMNT PAIN NOTED PAIN PRSNT: CPT | Performed by: NURSE PRACTITIONER

## 2023-10-31 PROCEDURE — 99213 OFFICE O/P EST LOW 20 MIN: CPT | Performed by: NURSE PRACTITIONER

## 2023-10-31 RX ORDER — FLUTICASONE PROPIONATE 50 MCG
1-2 SPRAY, SUSPENSION (ML) NASAL DAILY
Qty: 16 G | Refills: 5 | Status: SHIPPED | OUTPATIENT
Start: 2023-10-31 | End: 2024-01-11 | Stop reason: WASHOUT

## 2023-10-31 RX ORDER — AMOXICILLIN AND CLAVULANATE POTASSIUM 875; 125 MG/1; MG/1
875 TABLET, FILM COATED ORAL 2 TIMES DAILY
Qty: 14 TABLET | Refills: 0 | Status: SHIPPED | OUTPATIENT
Start: 2023-10-31 | End: 2023-11-07

## 2023-10-31 ASSESSMENT — PATIENT HEALTH QUESTIONNAIRE - PHQ9
2. FEELING DOWN, DEPRESSED OR HOPELESS: NOT AT ALL
1. LITTLE INTEREST OR PLEASURE IN DOING THINGS: NOT AT ALL
SUM OF ALL RESPONSES TO PHQ9 QUESTIONS 1 AND 2: 0

## 2023-10-31 NOTE — PROGRESS NOTES
Subjective   Patient ID: Elena Bueno is a 68 y.o. female who presents for Sinus Problem (Went to urgent care Friday. Given albuterol an advised to take mucinex. /Right side clogged . Whole right side of face/cough).    At 1 week  Started with head cold  Settled in right side of face  Muffled hearing  Feels disoriented  Taking mucinex and albuterol  Thick mucous  Coughing is better  No fever sweats or chills  No ST  No ear pain    Flu shot  RSV?  Covid booster?    Why CT chest?        Review of Systems  ROS completely negative except what was mentioned in the HPI.  Problem List, surgical, social, and family histories which were reviewed and updated as necessary within the EMR. I also personally reviewed the notes, labs, and imaging that pertained to what was documented or discussed in the HPI.    Objective   Physical Exam  Vitals and nursing note reviewed.   Constitutional:       General: She is not in acute distress.     Appearance: Normal appearance.   HENT:      Head: Normocephalic and atraumatic.      Right Ear: Tympanic membrane, ear canal and external ear normal.      Left Ear: Tympanic membrane, ear canal and external ear normal.      Nose: Nose normal.      Mouth/Throat:      Mouth: Mucous membranes are moist.   Eyes:      Extraocular Movements: Extraocular movements intact.      Conjunctiva/sclera: Conjunctivae normal.      Pupils: Pupils are equal, round, and reactive to light.   Cardiovascular:      Rate and Rhythm: Normal rate and regular rhythm.      Heart sounds: Normal heart sounds.   Pulmonary:      Effort: Pulmonary effort is normal.      Breath sounds: Normal breath sounds.   Musculoskeletal:         General: Normal range of motion.      Cervical back: Normal range of motion and neck supple.   Skin:     General: Skin is warm and dry.   Neurological:      General: No focal deficit present.      Mental Status: She is alert and oriented to person, place, and time. Mental status is at baseline.  "  Psychiatric:         Mood and Affect: Mood normal.         Behavior: Behavior normal.         Thought Content: Thought content normal.         Judgment: Judgment normal.         /83   Pulse 84   Temp 36.6 °C (97.9 °F) (Oral)   Ht 1.581 m (5' 2.25\")   Wt 58.8 kg (129 lb 9.6 oz)   SpO2 97%   BMI 23.51 kg/m²     Assessment/Plan    Problem List Items Addressed This Visit    None  Visit Diagnoses       Acute maxillary sinusitis, recurrence not specified    -  Primary    Relevant Medications    amoxicillin-pot clavulanate (Augmentin) 875-125 mg tablet    sodium chloride (Ocean) 0.65 % nasal spray    fluticasone (Flonase) 50 mcg/actuation nasal spray    Encounter for immunization        Relevant Orders    Flu vaccine, quadrivalent, high-dose, preservative free, age 65y+ (FLUZONE) (Completed)           "

## 2023-10-31 NOTE — PATIENT INSTRUCTIONS
"Routine fasting labs prior to appt with PCP  Opens April 1  Nothing to eat or drink for 10-12 hours prior, except plain water, hydrate well      GENERAL INFORMATION ABOUT VIRAL ILLNESSES    The typical course of an acute virus is rapid onset of symptoms that peak around day 3 through 5.  Symptoms begin to taper, then slowly improve each day around day 7-10 and thereafter.  Many times, individuals can have a nagging \"post viral cough\" that can last for several weeks that require only symptomatic care.  Post viral cough tends to be worse at night, productive in the morning, and improved throughout the day.  If initial symptoms improve and then worsen around 10 days, or simply do not improve, this could indicate now a bacterial infection and warrant a course of antibiotics & further evaluation.    Over the counter remedies include, but not limited to:    Delsym (Dextromethorphan) - to help suppress a cough  Mucinex (Guaifenesin) - an expectorant to loosen mucous and allow a cough to be more productive, decreasing risk for pneumonia or sinusitis  Mucinex DM (Guaifenesin & Dextromethorphan) - cough suppressant and expectorant in one  Flonase or Rhinocort - a nasal steroid spray to help open the sinuses, improve ear pain, & improve post nasal drip that may be contributing to the cough.  This is not used \"as needed\" but daily throughout the illness to be effective.  Nasal Saline spray - to keep mucous thin so it is easier to eliminate    Cough drops, hot tea, warm salt water gargles, Airborne, Tylenol &/or Ibuprofen    Some individuals may be prescribed Tessalon Pearls to help with cough, Albuterol Inhaler to help with wheezing and shortness of breath, oral or inhaled steroids for other lung complications    "

## 2023-11-07 ENCOUNTER — TELEPHONE (OUTPATIENT)
Dept: PRIMARY CARE | Facility: CLINIC | Age: 68
End: 2023-11-07
Payer: MEDICARE

## 2023-11-07 DIAGNOSIS — J01.00 ACUTE MAXILLARY SINUSITIS, RECURRENCE NOT SPECIFIED: Primary | ICD-10-CM

## 2023-11-07 RX ORDER — ONDANSETRON HYDROCHLORIDE 8 MG/1
8 TABLET, FILM COATED ORAL EVERY 8 HOURS PRN
Qty: 20 TABLET | Refills: 0 | Status: SHIPPED | OUTPATIENT
Start: 2023-11-07 | End: 2023-11-14

## 2023-11-07 NOTE — TELEPHONE ENCOUNTER
Pt left VM stating she was feeling better but her ear is still very clogged and she is nauseous and dizzy.   I spoke with Pt  Relayed SF message and Pt stated she will call Thursday if not improved but wondered if there was something she could take for the nausea. She has been taking stewart chews.  Please advise

## 2023-11-13 ENCOUNTER — APPOINTMENT (OUTPATIENT)
Dept: RADIOLOGY | Facility: CLINIC | Age: 68
End: 2023-11-13
Payer: COMMERCIAL

## 2023-11-16 ENCOUNTER — ANCILLARY PROCEDURE (OUTPATIENT)
Dept: RADIOLOGY | Facility: CLINIC | Age: 68
End: 2023-11-16
Payer: MEDICARE

## 2023-11-16 DIAGNOSIS — R91.1 LUNG NODULE: ICD-10-CM

## 2023-11-16 PROCEDURE — 71250 CT THORAX DX C-: CPT | Performed by: RADIOLOGY

## 2023-11-16 PROCEDURE — 71250 CT THORAX DX C-: CPT

## 2023-12-21 ENCOUNTER — TELEPHONE (OUTPATIENT)
Dept: PRIMARY CARE | Facility: CLINIC | Age: 68
End: 2023-12-21
Payer: MEDICARE

## 2023-12-21 DIAGNOSIS — G62.9 NEUROPATHY: ICD-10-CM

## 2023-12-21 RX ORDER — ACETAMINOPHEN, DIPHENHYDRAMINE HCL, PHENYLEPHRINE HCL 325; 25; 5 MG/1; MG/1; MG/1
1 TABLET ORAL DAILY
COMMUNITY
End: 2023-12-30 | Stop reason: ALTCHOICE

## 2023-12-21 NOTE — TELEPHONE ENCOUNTER
Patient calling states she has ongoing neuropathy in feet  Mentioned it to you at her last cpe and has since saw a podiatrist, he gave compound lotion gabapentin and didn't help   Her friend is a neurologist and said maybe its from vitamin b1 or b6 deficiency   Took b complex and it gave her hives , thinks it was niacin    Takes b12 5000mcg daily for a while now,updated med list   Has tried lidocaine,pina balm, tiger balm and nothing   Using cool gels and that helps a little bit   Please advise

## 2023-12-22 NOTE — TELEPHONE ENCOUNTER
Have her try Metanx  Is 'medical food' supplement for neuropathy  Data is decent on it  Goes through specialty pharmacy, can't remember name  On literature In sample closet  One daily  Is $60 for month

## 2023-12-26 ENCOUNTER — TELEMEDICINE (OUTPATIENT)
Dept: PRIMARY CARE | Facility: CLINIC | Age: 68
End: 2023-12-26
Payer: MEDICARE

## 2023-12-26 VITALS — BODY MASS INDEX: 23.59 KG/M2 | WEIGHT: 130 LBS

## 2023-12-26 DIAGNOSIS — G62.9 PERIPHERAL POLYNEUROPATHY: Primary | ICD-10-CM

## 2023-12-26 DIAGNOSIS — F41.9 ANXIETY: ICD-10-CM

## 2023-12-26 DIAGNOSIS — R73.01 IMPAIRED FASTING GLUCOSE: ICD-10-CM

## 2023-12-26 PROBLEM — Z20.822 SUSPECTED SEVERE ACUTE RESPIRATORY SYNDROME CORONAVIRUS 2 (SARS-COV-2) INFECTION: Status: RESOLVED | Noted: 2023-09-02 | Resolved: 2023-12-26

## 2023-12-26 PROBLEM — L57.0 ACTINIC KERATOSIS: Status: RESOLVED | Noted: 2023-09-02 | Resolved: 2023-12-26

## 2023-12-26 PROBLEM — J04.0 LARYNGITIS: Status: RESOLVED | Noted: 2023-09-02 | Resolved: 2023-12-26

## 2023-12-26 PROBLEM — M25.50 ARTHRALGIA OF MULTIPLE JOINTS: Status: RESOLVED | Noted: 2023-09-02 | Resolved: 2023-12-26

## 2023-12-26 PROBLEM — S63.599A: Status: RESOLVED | Noted: 2023-09-02 | Resolved: 2023-12-26

## 2023-12-26 PROBLEM — R93.5 ABNORMAL ULTRASOUND OF ABDOMEN: Status: RESOLVED | Noted: 2022-11-15 | Resolved: 2023-12-26

## 2023-12-26 PROBLEM — N28.9 RENAL INSUFFICIENCY: Status: RESOLVED | Noted: 2023-09-02 | Resolved: 2023-12-26

## 2023-12-26 PROBLEM — B35.1 ONYCHOMYCOSIS OF TOENAIL: Status: RESOLVED | Noted: 2023-02-03 | Resolved: 2023-12-26

## 2023-12-26 PROBLEM — M77.12 LATERAL EPICONDYLITIS OF BOTH ELBOWS: Status: RESOLVED | Noted: 2023-09-02 | Resolved: 2023-12-26

## 2023-12-26 PROBLEM — M77.11 LATERAL EPICONDYLITIS OF BOTH ELBOWS: Status: RESOLVED | Noted: 2023-09-02 | Resolved: 2023-12-26

## 2023-12-26 PROBLEM — S63.529A: Status: RESOLVED | Noted: 2023-09-02 | Resolved: 2023-12-26

## 2023-12-26 PROCEDURE — 99214 OFFICE O/P EST MOD 30 MIN: CPT | Performed by: INTERNAL MEDICINE

## 2023-12-26 RX ORDER — ACETAMINOPHEN AND PHENYLEPHRINE HCL 325; 5 MG/1; MG/1
1 TABLET ORAL DAILY
COMMUNITY
End: 2024-01-25 | Stop reason: WASHOUT

## 2023-12-26 RX ORDER — ALCOHOL 2.38 KG/3.79L
1 GEL TOPICAL DAILY
Qty: 90 CAPSULE | Refills: 3 | Status: SHIPPED | OUTPATIENT
Start: 2023-12-26 | End: 2024-02-26 | Stop reason: SDUPTHER

## 2023-12-26 RX ORDER — THIAMINE HCL 250 MG
250 TABLET ORAL DAILY
COMMUNITY
End: 2024-01-01 | Stop reason: ALTCHOICE

## 2023-12-26 RX ORDER — LORATADINE 10 MG/1
10 TABLET ORAL DAILY
COMMUNITY

## 2023-12-26 RX ORDER — PYRIDOXINE HCL (VITAMIN B6) 100 MG
100 TABLET ORAL DAILY
COMMUNITY
End: 2024-01-01 | Stop reason: ALTCHOICE

## 2023-12-26 RX ORDER — BISMUTH SUBSALICYLATE 262 MG
1 TABLET,CHEWABLE ORAL DAILY
COMMUNITY

## 2023-12-26 RX ORDER — GABAPENTIN 800 MG/1
800 TABLET ORAL NIGHTLY
Qty: 90 TABLET | Refills: 3 | Status: SHIPPED | OUTPATIENT
Start: 2023-12-26 | End: 2024-01-25 | Stop reason: SDUPTHER

## 2023-12-26 NOTE — PROGRESS NOTES
An interactive telecommunication system which permits real time communications between the patient (at the originating site) and provider (at the distant site) was utilized to provide this telehealth service.    Verbal consent was requested and obtained from the patient for this telehealth visit.    We have confirmed the patient wishes to see me, Dr. Kathryn Kang, a board certified Internal Medicine physician with an active [place] medical license as well as verified the patient's identity and physical location in [place].  Audio and Visual both.    Chief Complaint/HPI:  neuropathy (Ongoing foot neuropathy /Severe pin and needles on bottom of foot left foot is worse, worse at night/Has seen podiatry in past/Took a friends gabapentin over the weekend with good relief)  Last year saw Karina, diagnosis with neuropathy  Tried compounded med, $$ and didn't help  Has also tried topical agents OTC  Uses cold therapy socks that help  Affecting sleep  Tried friend's gabapentin 800mg and no AE  Worked for her      ASSESSMENT/PLAN  Problem List Items Addressed This Visit          Medium    Anxiety    Overview     On SSRI, Busoar and klonopin  Stable         Relevant Orders    Tsh With Reflex To Free T4 If Abnormal    Impaired fasting glucose    Relevant Orders    Hemoglobin A1c    Peripheral neuropathy - Primary    Relevant Medications    [id number]-algal oil (Metanx, algal oil,) 3 mg-35 mg-2 mg -90.314 mg capsule    gabapentin (Neurontin) 800 mg tablet    Other Relevant Orders    Celiac Panel    Serum Protein Electrophoresis    Urine Protein Electrophoresis    Vitamin B6    Vitamin B12    Vitamin B1, Whole Blood    Tsh With Reflex To Free T4 If Abnormal    AGNES with Reflex to NATE    Sedimentation Rate    Hepatitis C antibody    Hepatitis B Surface Antigen    Hepatitis B core antibody, total    Lyme AB Modified 2-Tier Testing, 1st Tier    Hemoglobin A1c         ALLERGIES  Fluoxetine, Niacin, Doxycycline, Fluoxetine hcl, and  Sulfa (sulfonamide antibiotics)    MEDICATIONS  Current Outpatient Medications   Medication Instructions    biotin 10,000 mcg capsule 1 capsule, oral, Daily    cholecalciferol (Vitamin D-3) 25 MCG (1000 UT) tablet 1 tablet, oral, Daily    cyanocobalamin, vitamin B-12, 5,000 mcg tablet, sublingual 1 tablet, sublingual, Daily    fluticasone (Flonase) 50 mcg/actuation nasal spray 1-2 sprays, Each Nostril, Daily, Shake gently. Before first use, prime pump. After use, clean tip and replace cap.    gabapentin (NEURONTIN) 800 mg, oral, Nightly    [id number]-algal oil (Metanx, algal oil,) 3 mg-35 mg-2 mg -90.314 mg capsule 1 capsule, oral, Daily    levothyroxine (Synthroid, Levoxyl) 50 mcg tablet 1 tablet, oral, Daily    loratadine (CLARITIN) 10 mg, oral, Daily    meloxicam (MOBIC) 15 mg, oral, Daily    multivitamin tablet 1 tablet, oral, Daily    pyridoxine (VITAMIN B-6) 100 mg, oral, Daily    simvastatin (Zocor) 40 mg tablet 1 tablet, oral, Daily    sodium chloride (Ocean) 0.65 % nasal spray 1 spray, Each Nostril, As needed    thiamine (VITAMIN B-1) 250 mg, oral, Daily    vitamin E 180 mg (400 unit) capsule 400-800 units daily        ROS otherwise negative aside from what was mentioned above in HPI.    PE:  Gen: Alert, NAD

## 2023-12-26 NOTE — TELEPHONE ENCOUNTER
Relayed to patient states Gabapentin did work for her   A friend of her gave her ,hers over the weekend   Would like to try that unless you think she should use this supplement first

## 2023-12-28 ENCOUNTER — LAB (OUTPATIENT)
Dept: LAB | Facility: LAB | Age: 68
End: 2023-12-28
Payer: MEDICARE

## 2023-12-28 DIAGNOSIS — R76.8 ANA POSITIVE: Primary | ICD-10-CM

## 2023-12-28 DIAGNOSIS — M25.50 POLYARTHRALGIA: ICD-10-CM

## 2023-12-28 DIAGNOSIS — G62.9 PERIPHERAL POLYNEUROPATHY: ICD-10-CM

## 2023-12-28 DIAGNOSIS — R73.01 IMPAIRED FASTING GLUCOSE: ICD-10-CM

## 2023-12-28 DIAGNOSIS — F41.9 ANXIETY: ICD-10-CM

## 2023-12-28 DIAGNOSIS — M19.90 ARTHRITIS: ICD-10-CM

## 2023-12-28 LAB
ERYTHROCYTE [SEDIMENTATION RATE] IN BLOOD BY WESTERGREN METHOD: 3 MM/H (ref 0–30)
EST. AVERAGE GLUCOSE BLD GHB EST-MCNC: 117 MG/DL
GLIADIN PEPTIDE IGA SER IA-ACNC: <1 U/ML
GLIADIN PEPTIDE IGG SER IA-ACNC: <1 U/ML
HBA1C MFR BLD: 5.7 %
HBV CORE AB SER QL: NONREACTIVE
HBV SURFACE AG SERPL QL IA: NONREACTIVE
HCV AB SER QL: NONREACTIVE
PROT SERPL-MCNC: 7.2 G/DL (ref 6.4–8.2)
PROT UR-ACNC: 12 MG/DL (ref 5–25)
TSH SERPL-ACNC: 2.47 MIU/L (ref 0.44–3.98)
TTG IGA SER IA-ACNC: <1 U/ML
TTG IGG SER IA-ACNC: <1 U/ML
VIT B12 SERPL-MCNC: 3253 PG/ML (ref 211–911)

## 2023-12-28 PROCEDURE — 86364 TISS TRNSGLTMNASE EA IG CLAS: CPT

## 2023-12-28 PROCEDURE — 86235 NUCLEAR ANTIGEN ANTIBODY: CPT

## 2023-12-28 PROCEDURE — 86803 HEPATITIS C AB TEST: CPT

## 2023-12-28 PROCEDURE — 84207 ASSAY OF VITAMIN B-6: CPT

## 2023-12-28 PROCEDURE — 83036 HEMOGLOBIN GLYCOSYLATED A1C: CPT

## 2023-12-28 PROCEDURE — 84155 ASSAY OF PROTEIN SERUM: CPT

## 2023-12-28 PROCEDURE — 86038 ANTINUCLEAR ANTIBODIES: CPT

## 2023-12-28 PROCEDURE — 87340 HEPATITIS B SURFACE AG IA: CPT

## 2023-12-28 PROCEDURE — 84156 ASSAY OF PROTEIN URINE: CPT

## 2023-12-28 PROCEDURE — 84165 PROTEIN E-PHORESIS SERUM: CPT | Performed by: STUDENT IN AN ORGANIZED HEALTH CARE EDUCATION/TRAINING PROGRAM

## 2023-12-28 PROCEDURE — 82607 VITAMIN B-12: CPT

## 2023-12-28 PROCEDURE — 86704 HEP B CORE ANTIBODY TOTAL: CPT

## 2023-12-28 PROCEDURE — 84443 ASSAY THYROID STIM HORMONE: CPT

## 2023-12-28 PROCEDURE — 86618 LYME DISEASE ANTIBODY: CPT

## 2023-12-28 PROCEDURE — 84425 ASSAY OF VITAMIN B-1: CPT

## 2023-12-28 PROCEDURE — 85652 RBC SED RATE AUTOMATED: CPT

## 2023-12-28 PROCEDURE — 36415 COLL VENOUS BLD VENIPUNCTURE: CPT

## 2023-12-28 PROCEDURE — 86258 DGP ANTIBODY EACH IG CLASS: CPT

## 2023-12-28 PROCEDURE — 84166 PROTEIN E-PHORESIS/URINE/CSF: CPT | Performed by: STUDENT IN AN ORGANIZED HEALTH CARE EDUCATION/TRAINING PROGRAM

## 2023-12-28 PROCEDURE — 86225 DNA ANTIBODY NATIVE: CPT

## 2023-12-28 PROCEDURE — 84166 PROTEIN E-PHORESIS/URINE/CSF: CPT

## 2023-12-28 PROCEDURE — 84165 PROTEIN E-PHORESIS SERUM: CPT

## 2023-12-29 PROBLEM — R76.8 ANA POSITIVE: Status: ACTIVE | Noted: 2023-12-29

## 2023-12-29 LAB
ANA PATTERN: ABNORMAL
ANA SER QL HEP2 SUBST: POSITIVE
ANA TITR SER IF: ABNORMAL {TITER}
CENTROMERE B AB SER-ACNC: <0.2 AI
CHROMATIN AB SERPL-ACNC: <0.2 AI
DSDNA AB SER-ACNC: <1 IU/ML
ENA JO1 AB SER QL IA: <0.2 AI
ENA RNP AB SER IA-ACNC: <0.2 AI
ENA SCL70 AB SER QL IA: <0.2 AI
ENA SM AB SER IA-ACNC: <0.2 AI
ENA SM+RNP AB SER QL IA: <0.2 AI
ENA SS-A AB SER IA-ACNC: <0.2 AI
ENA SS-B AB SER IA-ACNC: <0.2 AI
RIBOSOMAL P AB SER-ACNC: <0.2 AI

## 2023-12-30 PROBLEM — M25.50 POLYARTHRALGIA: Status: RESOLVED | Noted: 2023-09-02 | Resolved: 2023-12-30

## 2023-12-31 LAB — PYRIDOXAL PHOS SERPL-SCNC: 278.8 NMOL/L (ref 20–125)

## 2024-01-01 LAB
ALBUMIN MFR UR ELPH: 30.2 %
ALBUMIN: 4.6 G/DL (ref 3.4–5)
ALPHA 1 GLOBULIN: 0.3 G/DL (ref 0.2–0.6)
ALPHA 2 GLOBULIN: 0.7 G/DL (ref 0.4–1.1)
ALPHA1 GLOB MFR UR ELPH: 13.7 %
ALPHA2 GLOB MFR UR ELPH: 18.8 %
B BURGDOR.VLSE1+PEPC10 AB SER IA-ACNC: 0.72 IV
B-GLOBULIN MFR UR ELPH: 21.9 %
BETA GLOBULIN: 0.8 G/DL (ref 0.5–1.2)
GAMMA GLOB MFR UR ELPH: 15.4 %
GAMMA GLOBULIN: 0.8 G/DL (ref 0.5–1.4)
PATH REVIEW-SERUM PROTEIN ELECTROPHORESIS: NORMAL
PATH REVIEW-URINE PROTEIN ELECTROPHORESIS: NORMAL
PROTEIN ELECTROPHORESIS COMMENT: NORMAL
URINE ELECTROPHORESIS COMMENT: NORMAL
VIT B1 PYROPHOSHATE BLD-SCNC: 224 NMOL/L (ref 70–180)

## 2024-01-02 ENCOUNTER — TELEPHONE (OUTPATIENT)
Dept: PRIMARY CARE | Facility: CLINIC | Age: 69
End: 2024-01-02
Payer: MEDICARE

## 2024-01-02 NOTE — TELEPHONE ENCOUNTER
Can you asist with a sooner appt or fax referral to [facility] (Dr Dunia Knox) or [facility] (Dr Abundio Garcia)  so they can see her sooner (Or both)  Have her take 1st available

## 2024-01-02 NOTE — TELEPHONE ENCOUNTER
Patient calling wanted to let you know soonest Neuro available is 04/16/24  Should she see if she can be seen sooner or any recs?

## 2024-01-11 ENCOUNTER — TELEPHONE (OUTPATIENT)
Dept: PRIMARY CARE | Facility: CLINIC | Age: 69
End: 2024-01-11
Payer: MEDICARE

## 2024-01-17 DIAGNOSIS — E03.9 HYPOTHYROIDISM, ADULT: Primary | ICD-10-CM

## 2024-01-17 DIAGNOSIS — E78.2 HYPERLIPIDEMIA, MIXED: ICD-10-CM

## 2024-01-17 RX ORDER — SIMVASTATIN 40 MG/1
TABLET, FILM COATED ORAL
Qty: 90 TABLET | Refills: 3 | Status: SHIPPED | OUTPATIENT
Start: 2024-01-17

## 2024-01-17 RX ORDER — LEVOTHYROXINE SODIUM 50 UG/1
50 TABLET ORAL DAILY
Qty: 90 TABLET | Refills: 3 | Status: SHIPPED | OUTPATIENT
Start: 2024-01-17

## 2024-01-25 ENCOUNTER — TELEMEDICINE (OUTPATIENT)
Dept: PRIMARY CARE | Facility: CLINIC | Age: 69
End: 2024-01-25
Payer: MEDICARE

## 2024-01-25 ENCOUNTER — HOSPITAL ENCOUNTER (OUTPATIENT)
Dept: RADIOLOGY | Facility: CLINIC | Age: 69
Discharge: HOME | End: 2024-01-25
Payer: MEDICARE

## 2024-01-25 DIAGNOSIS — R73.01 IMPAIRED FASTING GLUCOSE: ICD-10-CM

## 2024-01-25 DIAGNOSIS — G62.9 PERIPHERAL POLYNEUROPATHY: ICD-10-CM

## 2024-01-25 DIAGNOSIS — R76.8 ANA POSITIVE: ICD-10-CM

## 2024-01-25 DIAGNOSIS — M79.605 LEFT LEG PAIN: ICD-10-CM

## 2024-01-25 DIAGNOSIS — F32.1 CURRENT MODERATE EPISODE OF MAJOR DEPRESSIVE DISORDER WITHOUT PRIOR EPISODE (MULTI): ICD-10-CM

## 2024-01-25 DIAGNOSIS — R76.8 ANA POSITIVE: Primary | ICD-10-CM

## 2024-01-25 PROBLEM — Z71.89 ADVANCED DIRECTIVES, COUNSELING/DISCUSSION: Status: RESOLVED | Noted: 2023-02-19 | Resolved: 2024-01-25

## 2024-01-25 PROBLEM — D21.22 FIBROMA OF LEFT FOOT: Status: ACTIVE | Noted: 2024-01-25

## 2024-01-25 PROCEDURE — 72100 X-RAY EXAM L-S SPINE 2/3 VWS: CPT

## 2024-01-25 PROCEDURE — 99214 OFFICE O/P EST MOD 30 MIN: CPT | Performed by: INTERNAL MEDICINE

## 2024-01-25 RX ORDER — AMITRIPTYLINE HYDROCHLORIDE 10 MG/1
10 TABLET, FILM COATED ORAL NIGHTLY
Qty: 30 TABLET | Refills: 11 | Status: SHIPPED | OUTPATIENT
Start: 2024-01-25 | End: 2024-04-18 | Stop reason: WASHOUT

## 2024-01-25 RX ORDER — GABAPENTIN 800 MG/1
800 TABLET ORAL 3 TIMES DAILY
Qty: 270 TABLET | Refills: 3 | Status: SHIPPED | OUTPATIENT
Start: 2024-01-25 | End: 2024-04-18 | Stop reason: WASHOUT

## 2024-01-25 ASSESSMENT — ENCOUNTER SYMPTOMS
VISUAL CHANGE: 0
NECK PAIN: 0
CONFUSION: 0
VOMITING: 0
VERTIGO: 0
FEVER: 0
NEAR-SYNCOPE: 0
NAUSEA: 0
FOCAL WEAKNESS: 0
MEMORY LOSS: 0
CLUMSINESS: 0
LOSS OF BALANCE: 0
PALPITATIONS: 0
SLURRED SPEECH: 0
AURA: 0
LIGHT-HEADEDNESS: 0
NEUROLOGIC COMPLAINT: 1
BACK PAIN: 0
HEADACHES: 0
DIAPHORESIS: 0
DIZZINESS: 0
SHORTNESS OF BREATH: 0
ABDOMINAL PAIN: 0
FOCAL SENSORY LOSS: 0
ALTERED MENTAL STATUS: 0
FATIGUE: 0
BOWEL INCONTINENCE: 0

## 2024-01-25 ASSESSMENT — PATIENT HEALTH QUESTIONNAIRE - PHQ9
2. FEELING DOWN, DEPRESSED OR HOPELESS: NOT AT ALL
SUM OF ALL RESPONSES TO PHQ9 QUESTIONS 1 AND 2: 0
1. LITTLE INTEREST OR PLEASURE IN DOING THINGS: NOT AT ALL

## 2024-01-25 NOTE — PROGRESS NOTES
An interactive telecommunication system which permits real time communications between the patient (at the originating site) and provider (at the distant site) was utilized to provide this telehealth service.    Verbal consent was requested and obtained from the patient for this telehealth visit.        CC/HPI:   Medication Problem (Neuropathy med not working- seeing neurologist on 2/6/Not sleeping well /Increase gabapentin? Or take with something else).  Neuropathy is up her leg now on the left, was just in the feet (right leg is fine now)  Tingling and numbness  Symptoms 24/7  Toothache type pain also  Was taking neurontin 800mg at bedtime only and metanyx one daily  Helped at first then stopped working  Tried BID and didn't think much better  Cold pack helps  Denies back and hip pain  Has known fibromas on bottom of left foot, grown  Sa wpodiatry in past, had steroid injection, AE from that    Labs reviewed:  Component      Latest Ref Rng 12/28/2023   Anti-SM      <1.0 AI <0.2    Anti-RNP      <1.0 AI <0.2    Anti-SM/RNP      <1.0 AI <0.2    Anti-SSA      <1.0 AI <0.2    Anti-SSB      <1.0 AI <0.2    Anti-SCL-70      <1.0 AI <0.2    Anti-LAVON-1 IgG      <1.0 AI <0.2    Anti-Chromatin      <1.0 AI <0.2    Anti-Centromere      <1.0 AI <0.2    ANTI-RIBOSOMAL P      <1.0 AI <0.2    Anti-DNA (DS)      <5.0 IU/mL <1.0    Albumin      3.4 - 5.0 g/dL 4.6    Alpha 1 Globulin      0.2 - 0.6 g/dL 0.3    Alpha 2 Globulin      0.4 - 1.1 g/dL 0.7    Beta Globulin      0.5 - 1.2 g/dL 0.8    Gamma      0.5 - 1.4 g/dL 0.8    Protein Electrophoresis Comment Normal.    Path Review - Serum Protein Electrophoresis  Reviewed and approved by KATH HERNANDEZ on 1/1/24 at 11:19 PM.    Albumin %      % 30.2    Alpha 1 Globulin %      % 13.7    Alpha 2 Globulin %      % 18.8    Beta Globulin %      % 21.9    Gamma Globulin %      % 15.4    Urine Electrophoresis Comment Normal.    Path Review-Urine Protein Electrophoresis  Reviewed and  approved by KATH HERNANDEZ on 1/1/24 at 5:43 PM.    Tissue Transglutaminase, IgA      <15.0 U/mL <1.0    Tissue Transglutamase IgG      <15.0 U/mL <1.0    Deamidated Gliadin Antibody IgA      <15.0 U/mL <1.0    Deamidated Gliadin Antibody IgG      <15.0 U/mL <1.0    AGNES      Negative  Positive !    AGNES Pattern Homogeneous    AGNES Titer 1:320    Hemoglobin A1C      see below % 5.7 (H)    Estimated Average Glucose      Not Established mg/dL 117    Vitamin B6      20.0 - 125.0 nmol/L 278.8 (H)    Vitamin B12      211 - 911 pg/mL 3,253 (H)    Vitamin B1, Whole Blood      70 - 180 nmol/L 224 (H)    Thyroid Stimulating Hormone      0.44 - 3.98 mIU/L 2.47    Sed Rate      0 - 30 mm/h 3    Hepatitis C AB      Nonreactive  Nonreactive    Hepatitis B Surface AG      Nonreactive  Nonreactive    Hepatitis B Core AB- Total      Nonreactive  Nonreactive    B. burgdorferi VlsE1/pepC10 Abs, NIKKIE      <=0.90 IV 0.72    Total Protein      6.4 - 8.2 g/dL 7.2    Total Protein, Urine      5 - 25 mg/dL 12    Was on B vitamin, has since stopped  Diet is good  Doesnt eat carbs  No fam hx DM    Assessment and Plan:  Problem List Items Addressed This Visit          Medium    AGNES positive - Primary    Overview     12/23: 1:320, homogenous         Relevant Orders    XR lumbar spine 2-3 views    Current moderate episode of major depressive disorder without prior episode (CMS/HCC)    Overview     s/p  hospitalization 2019  at the time: [facility]: 55 -60-51 Moderate symptoms or moderate difficulty in social, occupational or school functioning  Weaned of Paxil at that time. Failed Pamelor.    Stopped Buspar and Wellbutrin in 2022  (Stopped Seroquel in 2021)  On Zoloft  Managed by Psychiatry, controlled well now.         Impaired fasting glucose    Overview     12/23: Hba1c 5.7%         Current Assessment & Plan     Might roel to consider Metformin   ? If this R/t neuropathy symptoms, wouldnt think so but possible         Peripheral neuropathy    Overview      On Gabapentin and Metanx           Current Assessment & Plan     Increase May to 1200mg at HS and then 800mg twice daily to = TID dosing  Increase Metanx to 2/daily  Add Elavil at HS if needed  Await Neuro eval/recs  XR back to r/o spine origin  May need EMG         Relevant Medications    gabapentin (Neurontin) 800 mg tablet    amitriptyline (Elavil) 10 mg tablet    Other Relevant Orders    XR lumbar spine 2-3 views     Other Visit Diagnoses       Left leg pain        Relevant Orders    XR lumbar spine 2-3 views              ROS otherwise negative aside from what was mentioned above in HPI.    Vitals  There were no vitals taken for this visit.  There is no height or weight on file to calculate BMI.  Physical Exam  Gen: Alert, NAD    Allergies and Medications  Fluoxetine, Niacin, Doxycycline, and Sulfa (sulfonamide antibiotics)  Current Outpatient Medications   Medication Instructions    amitriptyline (ELAVIL) 10 mg, oral, Nightly    cholecalciferol (Vitamin D-3) 25 MCG (1000 UT) tablet 1 tablet, oral, Daily    gabapentin (NEURONTIN) 800 mg, oral, 3 times daily    [id number]-algal oil (Metanx, algal oil,) 3 mg-35 mg-2 mg -90.314 mg capsule 1 capsule, oral, Daily    levothyroxine (SYNTHROID, LEVOXYL) 50 mcg, oral, Daily    loratadine (CLARITIN) 10 mg, oral, Daily    meloxicam (MOBIC) 15 mg, oral, Daily    multivitamin tablet 1 tablet, oral, Daily    simvastatin (Zocor) 40 mg tablet TAKE 1 TABLET EVERY DAY (NEW DOSE)    vitamin E 180 mg (400 unit) capsule 400-800 units daily         Answers submitted by the patient for this visit:  Neurological Problem Questionnaire (Submitted on 1/25/2024)  Chief Complaint: Neurologic complaint  altered mental status: No  clumsiness: No  focal sensory loss: No  focal weakness: No  loss of balance: No  memory loss: No  near-syncope: No  slurred speech: No  syncope: No  visual change: No  Chronicity: recurrent  Onset: more than 1 month ago  Onset quality:  gradually  Progression since onset: gradually worsening  Focality: left-sided, lower extremity  abdominal pain: No  auditory change: No  aura: No  back pain: No  bladder incontinence: No  bowel incontinence: No  chest pain: No  confusion: No  diaphoresis: No  dizziness: No  fatigue: No  fever: No  headaches: No  light-headedness: No  nausea: No  neck pain: No  palpitations: No  shortness of breath: No  vertigo: No  vomiting: No  Treatments tried: medication  Improvement on treatment: no relief

## 2024-01-25 NOTE — ASSESSMENT & PLAN NOTE
Increase May to 1200mg at HS and then 800mg twice daily to = TID dosing  Increase Metanx to 2/daily  Add Elavil at HS if needed  Await Neuro eval/recs  XR back to r/o spine origin  May need EMG

## 2024-01-25 NOTE — ASSESSMENT & PLAN NOTE
Might roel to consider Metformin   ? If this R/t neuropathy symptoms, wouldnt think so but possible

## 2024-02-26 DIAGNOSIS — G62.9 PERIPHERAL POLYNEUROPATHY: ICD-10-CM

## 2024-02-26 RX ORDER — ALCOHOL 2.38 KG/3.79L
2 GEL TOPICAL DAILY
Qty: 90 CAPSULE | Refills: 3 | Status: SHIPPED | OUTPATIENT
Start: 2024-02-26

## 2024-04-04 ENCOUNTER — LAB (OUTPATIENT)
Dept: LAB | Facility: LAB | Age: 69
End: 2024-04-04
Payer: MEDICARE

## 2024-04-04 DIAGNOSIS — E55.9 VITAMIN D DEFICIENCY: ICD-10-CM

## 2024-04-04 DIAGNOSIS — E78.2 HYPERLIPIDEMIA, MIXED: ICD-10-CM

## 2024-04-04 DIAGNOSIS — E03.9 HYPOTHYROIDISM, ADULT: ICD-10-CM

## 2024-04-04 DIAGNOSIS — F32.1 CURRENT MODERATE EPISODE OF MAJOR DEPRESSIVE DISORDER WITHOUT PRIOR EPISODE (MULTI): ICD-10-CM

## 2024-04-04 LAB
25(OH)D3 SERPL-MCNC: 36 NG/ML (ref 30–100)
ALBUMIN SERPL BCP-MCNC: 4.4 G/DL (ref 3.4–5)
ALP SERPL-CCNC: 114 U/L (ref 33–136)
ALT SERPL W P-5'-P-CCNC: 24 U/L (ref 7–45)
ANION GAP SERPL CALC-SCNC: 10 MMOL/L (ref 10–20)
APPEARANCE UR: CLEAR
AST SERPL W P-5'-P-CCNC: 26 U/L (ref 9–39)
BASOPHILS # BLD AUTO: 0.04 X10*3/UL (ref 0–0.1)
BASOPHILS NFR BLD AUTO: 0.8 %
BILIRUB SERPL-MCNC: 0.4 MG/DL (ref 0–1.2)
BILIRUB UR STRIP.AUTO-MCNC: NEGATIVE MG/DL
BUN SERPL-MCNC: 30 MG/DL (ref 6–23)
CALCIUM SERPL-MCNC: 9.7 MG/DL (ref 8.6–10.3)
CHLORIDE SERPL-SCNC: 108 MMOL/L (ref 98–107)
CHOLEST SERPL-MCNC: 205 MG/DL (ref 0–199)
CHOLESTEROL/HDL RATIO: 3.2
CO2 SERPL-SCNC: 28 MMOL/L (ref 21–32)
COLOR UR: COLORLESS
CREAT SERPL-MCNC: 0.7 MG/DL (ref 0.5–1.05)
EGFRCR SERPLBLD CKD-EPI 2021: >90 ML/MIN/1.73M*2
EOSINOPHIL # BLD AUTO: 0.09 X10*3/UL (ref 0–0.7)
EOSINOPHIL NFR BLD AUTO: 1.8 %
ERYTHROCYTE [DISTWIDTH] IN BLOOD BY AUTOMATED COUNT: 13.2 % (ref 11.5–14.5)
GLUCOSE SERPL-MCNC: 105 MG/DL (ref 74–99)
GLUCOSE UR STRIP.AUTO-MCNC: NEGATIVE MG/DL
HCT VFR BLD AUTO: 41.1 % (ref 36–46)
HDLC SERPL-MCNC: 63.2 MG/DL
HGB BLD-MCNC: 13.2 G/DL (ref 12–16)
IMM GRANULOCYTES # BLD AUTO: 0.01 X10*3/UL (ref 0–0.7)
IMM GRANULOCYTES NFR BLD AUTO: 0.2 % (ref 0–0.9)
KETONES UR STRIP.AUTO-MCNC: NEGATIVE MG/DL
LDLC SERPL CALC-MCNC: 125 MG/DL
LEUKOCYTE ESTERASE UR QL STRIP.AUTO: NEGATIVE
LYMPHOCYTES # BLD AUTO: 2.31 X10*3/UL (ref 1.2–4.8)
LYMPHOCYTES NFR BLD AUTO: 46.6 %
MCH RBC QN AUTO: 29.7 PG (ref 26–34)
MCHC RBC AUTO-ENTMCNC: 32.1 G/DL (ref 32–36)
MCV RBC AUTO: 93 FL (ref 80–100)
MONOCYTES # BLD AUTO: 0.4 X10*3/UL (ref 0.1–1)
MONOCYTES NFR BLD AUTO: 8.1 %
NEUTROPHILS # BLD AUTO: 2.11 X10*3/UL (ref 1.2–7.7)
NEUTROPHILS NFR BLD AUTO: 42.5 %
NITRITE UR QL STRIP.AUTO: NEGATIVE
NON HDL CHOLESTEROL: 142 MG/DL (ref 0–149)
NRBC BLD-RTO: 0 /100 WBCS (ref 0–0)
PH UR STRIP.AUTO: 5 [PH]
PLATELET # BLD AUTO: 180 X10*3/UL (ref 150–450)
POTASSIUM SERPL-SCNC: 4.7 MMOL/L (ref 3.5–5.3)
PROT SERPL-MCNC: 6.8 G/DL (ref 6.4–8.2)
PROT UR STRIP.AUTO-MCNC: NEGATIVE MG/DL
RBC # BLD AUTO: 4.44 X10*6/UL (ref 4–5.2)
RBC # UR STRIP.AUTO: NEGATIVE /UL
SODIUM SERPL-SCNC: 141 MMOL/L (ref 136–145)
SP GR UR STRIP.AUTO: 1
TRIGL SERPL-MCNC: 86 MG/DL (ref 0–149)
TSH SERPL-ACNC: 3.02 MIU/L (ref 0.44–3.98)
UROBILINOGEN UR STRIP.AUTO-MCNC: <2 MG/DL
VLDL: 17 MG/DL (ref 0–40)
WBC # BLD AUTO: 5 X10*3/UL (ref 4.4–11.3)

## 2024-04-04 PROCEDURE — 81003 URINALYSIS AUTO W/O SCOPE: CPT

## 2024-04-04 PROCEDURE — 36415 COLL VENOUS BLD VENIPUNCTURE: CPT

## 2024-04-04 PROCEDURE — 85025 COMPLETE CBC W/AUTO DIFF WBC: CPT

## 2024-04-04 PROCEDURE — 82306 VITAMIN D 25 HYDROXY: CPT

## 2024-04-04 PROCEDURE — 80061 LIPID PANEL: CPT

## 2024-04-04 PROCEDURE — 80053 COMPREHEN METABOLIC PANEL: CPT

## 2024-04-04 PROCEDURE — 84443 ASSAY THYROID STIM HORMONE: CPT

## 2024-04-16 ENCOUNTER — APPOINTMENT (OUTPATIENT)
Dept: NEUROLOGY | Facility: CLINIC | Age: 69
End: 2024-04-16
Payer: MEDICARE

## 2024-04-18 ENCOUNTER — OFFICE VISIT (OUTPATIENT)
Dept: PRIMARY CARE | Facility: CLINIC | Age: 69
End: 2024-04-18
Payer: MEDICARE

## 2024-04-18 VITALS
SYSTOLIC BLOOD PRESSURE: 109 MMHG | HEIGHT: 62 IN | HEART RATE: 86 BPM | TEMPERATURE: 96.6 F | OXYGEN SATURATION: 99 % | DIASTOLIC BLOOD PRESSURE: 74 MMHG | WEIGHT: 129 LBS | BODY MASS INDEX: 23.74 KG/M2

## 2024-04-18 DIAGNOSIS — G62.9 PERIPHERAL POLYNEUROPATHY: ICD-10-CM

## 2024-04-18 DIAGNOSIS — Z13.89 SCREENING FOR MULTIPLE CONDITIONS: ICD-10-CM

## 2024-04-18 DIAGNOSIS — Z12.31 ENCOUNTER FOR SCREENING MAMMOGRAM FOR BREAST CANCER: ICD-10-CM

## 2024-04-18 DIAGNOSIS — R73.01 IMPAIRED FASTING GLUCOSE: ICD-10-CM

## 2024-04-18 DIAGNOSIS — E03.9 HYPOTHYROIDISM, ADULT: ICD-10-CM

## 2024-04-18 DIAGNOSIS — Q45.3 ABNORMALITY OF PANCREATIC DUCT: ICD-10-CM

## 2024-04-18 DIAGNOSIS — Z78.0 MENOPAUSE: ICD-10-CM

## 2024-04-18 DIAGNOSIS — E55.9 VITAMIN D DEFICIENCY: ICD-10-CM

## 2024-04-18 DIAGNOSIS — Z86.59 HISTORY OF DEPRESSION: ICD-10-CM

## 2024-04-18 DIAGNOSIS — E78.2 HYPERLIPIDEMIA, MIXED: ICD-10-CM

## 2024-04-18 DIAGNOSIS — Z00.00 ROUTINE ADULT HEALTH MAINTENANCE: Primary | ICD-10-CM

## 2024-04-18 DIAGNOSIS — D18.03 HEPATIC HEMANGIOMA: ICD-10-CM

## 2024-04-18 DIAGNOSIS — Z71.89 CARDIAC RISK COUNSELING: ICD-10-CM

## 2024-04-18 DIAGNOSIS — Z80.0 FAMILY HISTORY OF PANCREATIC CANCER: ICD-10-CM

## 2024-04-18 DIAGNOSIS — Z71.89 ADVANCE DIRECTIVE DISCUSSED WITH PATIENT: ICD-10-CM

## 2024-04-18 DIAGNOSIS — Z01.419 ENCOUNTER FOR GYNECOLOGICAL EXAMINATION WITHOUT ABNORMAL FINDING: ICD-10-CM

## 2024-04-18 DIAGNOSIS — M19.90 ARTHRITIS: ICD-10-CM

## 2024-04-18 DIAGNOSIS — Z12.11 SCREEN FOR COLON CANCER: ICD-10-CM

## 2024-04-18 DIAGNOSIS — Z80.8 FAMILY HISTORY OF MELANOMA: ICD-10-CM

## 2024-04-18 PROBLEM — F32.9 MDD (MAJOR DEPRESSIVE DISORDER): Status: RESOLVED | Noted: 2019-01-08 | Resolved: 2024-04-18

## 2024-04-18 PROBLEM — Z86.19 HISTORY OF HPV INFECTION: Status: ACTIVE | Noted: 2023-09-02

## 2024-04-18 PROBLEM — Z12.12 SCREENING FOR COLORECTAL CANCER: Status: RESOLVED | Noted: 2023-02-03 | Resolved: 2024-04-18

## 2024-04-18 PROBLEM — F41.1 GENERALIZED ANXIETY DISORDER: Status: RESOLVED | Noted: 2023-09-02 | Resolved: 2024-04-18

## 2024-04-18 PROBLEM — K76.9 LESION OF LIVER: Status: RESOLVED | Noted: 2023-09-02 | Resolved: 2024-04-18

## 2024-04-18 PROBLEM — R09.89 PULMONARY HYPERINFLATION: Status: RESOLVED | Noted: 2023-02-03 | Resolved: 2024-04-18

## 2024-04-18 PROCEDURE — 99397 PER PM REEVAL EST PAT 65+ YR: CPT | Performed by: INTERNAL MEDICINE

## 2024-04-18 PROCEDURE — G0444 DEPRESSION SCREEN ANNUAL: HCPCS | Performed by: INTERNAL MEDICINE

## 2024-04-18 PROCEDURE — 1159F MED LIST DOCD IN RCRD: CPT | Performed by: INTERNAL MEDICINE

## 2024-04-18 PROCEDURE — 99214 OFFICE O/P EST MOD 30 MIN: CPT | Performed by: INTERNAL MEDICINE

## 2024-04-18 PROCEDURE — G0101 CA SCREEN;PELVIC/BREAST EXAM: HCPCS | Performed by: INTERNAL MEDICINE

## 2024-04-18 PROCEDURE — 1158F ADVNC CARE PLAN TLK DOCD: CPT | Performed by: INTERNAL MEDICINE

## 2024-04-18 PROCEDURE — 99497 ADVNCD CARE PLAN 30 MIN: CPT | Performed by: INTERNAL MEDICINE

## 2024-04-18 PROCEDURE — 1160F RVW MEDS BY RX/DR IN RCRD: CPT | Performed by: INTERNAL MEDICINE

## 2024-04-18 PROCEDURE — G0446 INTENS BEHAVE THER CARDIO DX: HCPCS | Performed by: INTERNAL MEDICINE

## 2024-04-18 PROCEDURE — G0439 PPPS, SUBSEQ VISIT: HCPCS | Performed by: INTERNAL MEDICINE

## 2024-04-18 PROCEDURE — 1123F ACP DISCUSS/DSCN MKR DOCD: CPT | Performed by: INTERNAL MEDICINE

## 2024-04-18 PROCEDURE — 1036F TOBACCO NON-USER: CPT | Performed by: INTERNAL MEDICINE

## 2024-04-18 RX ORDER — ALBUTEROL SULFATE 90 UG/1
1-2 AEROSOL, METERED RESPIRATORY (INHALATION) EVERY 4 HOURS PRN
COMMUNITY
Start: 2023-10-27

## 2024-04-18 RX ORDER — NICOTINE POLACRILEX 2 MG
1 GUM BUCCAL DAILY
COMMUNITY

## 2024-04-18 RX ORDER — DULOXETIN HYDROCHLORIDE 30 MG/1
30 CAPSULE, DELAYED RELEASE ORAL DAILY
COMMUNITY
Start: 2024-02-22 | End: 2024-04-18 | Stop reason: WASHOUT

## 2024-04-18 RX ORDER — DULOXETIN HYDROCHLORIDE 30 MG/1
30 CAPSULE, DELAYED RELEASE ORAL DAILY
Qty: 30 CAPSULE | Refills: 0 | Status: SHIPPED | OUTPATIENT
Start: 2024-04-18 | End: 2024-05-03 | Stop reason: SDUPTHER

## 2024-04-18 RX ORDER — PREGABALIN 200 MG/1
200 CAPSULE ORAL 3 TIMES DAILY
COMMUNITY

## 2024-04-18 RX ORDER — LEVOMEFOLATE CALCIUM 15 MG
1 TABLET ORAL 3 TIMES DAILY
COMMUNITY
Start: 2023-12-26 | End: 2024-04-18 | Stop reason: WASHOUT

## 2024-04-18 ASSESSMENT — PATIENT HEALTH QUESTIONNAIRE - PHQ9
1. LITTLE INTEREST OR PLEASURE IN DOING THINGS: NOT AT ALL
SUM OF ALL RESPONSES TO PHQ9 QUESTIONS 1 AND 2: 0
2. FEELING DOWN, DEPRESSED OR HOPELESS: NOT AT ALL

## 2024-04-18 NOTE — ASSESSMENT & PLAN NOTE
Doubt this level is causing neuropathy symptoms but a thought  Could Trial Metformin and see if symptoms any better

## 2024-04-18 NOTE — PROGRESS NOTES
Annual Medicare Wellness Exam/Comprehensive Problem Focused Follow Up  Chief Complaint   Patient presents with    Medicare Annual Wellness Visit Subsequent     Discuss neuropathy in left foot.  Spot on left hand please look at.     Since last saw me in 1/24 saw Neurology for neuropathy  Last note copied:  Assessment/Plan   Diagnoses and all orders for this visit:  Idiopathic peripheral neuropathy  Elena Bueno is a a pleasant 68 year old female who complains of progressive sensory disturbance in the distal lower extremities manifested predominantly as numbness and paresthesia in his bilateral feet which have been progressive over the past 8 months, Possible etiologies would include a generalized process affecting large fibers such as peripheral neuropathy, lumbar radiculopathy, or lumbosacral plexopathy. I cannot exclude a small fiber neuropathy contributing to predominantly sensory symptoms in the lower extremities, I cannot exclude it medical condition or metabolic process contributing to peripheral nerve dysfunction including polyneuropathy.   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 Unremarkable radiographic evaluation of the lumbar spine.   2 weeks ago symptoms were almost 100% gone but now they're back  Right foot has zero symptoms now, used to be bilateral  Nothing was different before or after aside from more physical activity and added back Biotin 10K daily  Feels like bees buzzing around her feet/mid leg  Extreme pain inn her left 2nd toe  Did get COVID vaccines, symptoms not related temporally  Has not had COVID    Spot on hand 2 weeks  Thinks may have burned it    Having night sweats again  Started Vit E again jsut he other day      Labs reviewed:  Component      Latest Ref Rng 4/4/2024   LEUKOCYTES (10*3/UL) IN BLOOD BY AUTOMATED COUNT, Kh[place]      4.4 - 11.3 x10*3/uL 5.0    nRBC      0.0 - 0.0  /100 WBCs 0.0    ERYTHROCYTES (10*6/UL) IN BLOOD BY AUTOMATED COUNT, [place]      4.00 - 5.20 x10*6/uL 4.44    HEMOGLOBIN      12.0 - 16.0 g/dL 13.2    HEMATOCRIT      36.0 - 46.0 % 41.1    MCV      80 - 100 fL 93    MCH      26.0 - 34.0 pg 29.7    MCHC      32.0 - 36.0 g/dL 32.1    RED CELL DISTRIBUTION WIDTH      11.5 - 14.5 % 13.2    PLATELETS (10*3/UL) IN BLOOD AUTOMATED COUNT, [place]      150 - 450 x10*3/uL 180    NEUTROPHILS/100 LEUKOCYTES IN BLOOD BY AUTOMATED COUNT, [place]      40.0 - 80.0 % 42.5    Immature Granulocytes %, Automated      0.0 - 0.9 % 0.2    Lymphocytes %      13.0 - 44.0 % 46.6    Monocytes %      2.0 - 10.0 % 8.1    Eosinophils %      0.0 - 6.0 % 1.8    Basophils %      0.0 - 2.0 % 0.8    NEUTROPHILS (10*3/UL) IN BLOOD BY AUTOMATED COUNT, [place]      1.20 - 7.70 x10*3/uL 2.11    Immature Granulocytes Absolute, Automated      0.00 - 0.70 x10*3/uL 0.01    Lymphocytes Absolute      1.20 - 4.80 x10*3/uL 2.31    Monocytes Absolute      0.10 - 1.00 x10*3/uL 0.40    Eosinophils Absolute      0.00 - 0.70 x10*3/uL 0.09    Basophils Absolute      0.00 - 0.10 x10*3/uL 0.04    GLUCOSE      74 - 99 mg/dL 105 (H)    SODIUM      136 - 145 mmol/L 141    POTASSIUM      3.5 - 5.3 mmol/L 4.7    CHLORIDE      98 - 107 mmol/L 108 (H)    Bicarbonate      21 - 32 mmol/L 28    Anion Gap      10 - 20 mmol/L 10    Blood Urea Nitrogen      6 - 23 mg/dL 30 (H)    Creatinine      0.50 - 1.05 mg/dL 0.70    EGFR      >60 mL/min/1.73m*2 >90    Calcium      8.6 - 10.3 mg/dL 9.7    Albumin      3.4 - 5.0 g/dL 4.4    Alkaline Phosphatase      33 - 136 U/L 114    Total Protein      6.4 - 8.2 g/dL 6.8    AST      9 - 39 U/L 26    Bilirubin Total      0.0 - 1.2 mg/dL 0.4    ALT      7 - 45 U/L 24    Color, Urine      Straw, Yellow  Colorless ! (N)    Appearance, Urine      Clear  Clear    Specific Gravity, Urine      1.005 - 1.035  1.005    pH, Urine      5.0, 5.5, 6.0, 6.5, 7.0, 7.5, 8.0  5.0    Protein, Urine       NEGATIVE mg/dL NEGATIVE    Glucose, Urine      NEGATIVE mg/dL NEGATIVE    Blood, Urine      NEGATIVE  NEGATIVE    Ketones, Urine      NEGATIVE mg/dL NEGATIVE    Bilirubin, Urine      NEGATIVE  NEGATIVE    Urobilinogen, Urine      <2.0 mg/dL <2.0    Nitrite, Urine      NEGATIVE  NEGATIVE    Leukocyte Esterase, Urine      NEGATIVE  NEGATIVE    CHOLESTEROL      0 - 199 mg/dL 205 (H)    HDL CHOLESTEROL      mg/dL 63.2    Cholesterol/HDL Ratio 3.2    LDL Calculated      <=99 mg/dL 125 (H)    VLDL      0 - 40 mg/dL 17    TRIGLYCERIDES      0 - 149 mg/dL 86    Non HDL Cholesterol      0 - 149 mg/dL 142    Vitamin D, 25-Hydroxy, Total      30 - 100 ng/mL 36    Thyroid Stimulating Hormone      0.44 - 3.98 mIU/L 3.02       Thyroid Stimulating Hormone  0.44 - 3.98 mIU/L 3.02 2.47 1.70 CM   On Biotin since 3/12/24  On vit D but not with food    Assessment and Plan:  Problem List Items Addressed This Visit          High    Routine adult health maintenance - Primary    Overview     Influenza Seasonal 12/1/2018, 10/1/20, 9/3/21, 10/24/22, 10/31/23  Moderna: 3/19/21, 4/17/21, 12/17/21  Tdap (Age 7+)5/29/2008, 6/25/20   Pneumovax 23 9/3/21  Prevnar 20 4/7/23  Zostavax 7/19/2014   Shingrix 9/2020, 11/20   Mamm 2016; 9/8/20; 10/21/21, 11/15/22  BMD 7/16; 9/8/20; 11/16/22  Cscope declined in past  PAP/HPV 7/18/14, 2018 (-)  12/29/23 Hep C (-)         Current Assessment & Plan     Annual Wellness exam completed   Preventive Health History reviewed  Ordered:   Labs    Mammogram   BMD   Cologuard              Medium    Abnormality of pancreatic duct    Overview     3/2021 CT chest: Borderline dilated main pancreatic duct in the pancreatic body measuring up to 4 mm, however this appears similar compared to remote prior dated 05/03/2011.         Current Assessment & Plan     MRI pancreas given family hx         Advance directive discussed with patient    Overview     4/18/24: HCPOA is Asha Rodas (daughter)  FULL CODE         Arthritis     Overview     DIP and CMC joints  Mobic prn         Cardiac risk counseling    Overview     4/18/24:  Current 10-Year ASCVD Risk: 5.5%   ASA not rec'd         Encounter for screening mammogram for breast cancer    Relevant Orders    BI mammo bilateral screening tomosynthesis    Family history of melanoma    Relevant Orders    Referral to Dermatology    Family history of pancreatic cancer    Relevant Orders    MR pancreas screening self pay    Hepatic hemangioma    Overview     3/2021 CT chest: There are hypoattenuating hepatic lesions measuring 1 cm in the left hepatic lobe and 0.8 cm in the right hepatic lobe. These are indeterminate on current study.  US 10/21: 1. Right hepatic lobe lesion measuring up to 0.8 cm with imaging characteristics suggestive of a benign hemangioma.  2. Previously noted left hepatic lobe lesion is not visualized in the present examination.         Current Assessment & Plan     MRI abd ordered         History of depression    Overview     s/p  hospitalization 2019  at the time: [facility]: 55 -60-51 Moderate symptoms or moderate difficulty in social, occupational or school functioning  Weaned of Paxil at that time.   Failed Pamelor.    Stopped Buspar and Wellbutrin in 2022  (Stopped Seroquel in 2021)  On Zoloft in past, stopped and has done fine off medication  Managed by Psychiatry, controlled well now.         Hyperlipidemia, mixed    Overview     Goal LDL <130  ON statin and fish oil         Relevant Orders    Comprehensive Metabolic Panel    CBC and Auto Differential    Lipid Panel    Urinalysis with Reflex Culture and Microscopic    Hypothyroidism, adult    Overview     Started Thyroid medication 2022  labs monitored         Relevant Orders    TSH with reflex to Free T4 if abnormal    Impaired fasting glucose    Overview     12/23: Hba1c 5.7%  4/18/24:          Current Assessment & Plan     Doubt this level is causing neuropathy symptoms but a thought  Could Trial Metformin and see if  "symptoms any better         Relevant Orders    Hemoglobin A1c    Menopause    Relevant Orders    XR DEXA bone density    Peripheral neuropathy    Overview     EMG done  On Gabapentin in past  On Lyrica and Metanx and Biotin  On NSAIDs  Seeing Neuro         Current Assessment & Plan     MRI lumbar  Trial CYmbalta         Relevant Medications    DULoxetine (Cymbalta) 30 mg DR capsule    Other Relevant Orders    Comprehensive Metabolic Panel    MR lumbar spine wo IV contrast    Hemoglobin A1c    Screen for colon cancer    Relevant Orders    Cologuard® colon cancer screening    Screening for multiple conditions    Overview     Depression screening completed           Vitamin D deficiency    Overview     4/24: 36 on 1 tablet daily  Goal 40-50  On supplement         Current Assessment & Plan     Increase to 2 tablets daily         Relevant Orders    Vitamin D 25-Hydroxy,Total (for eval of Vitamin D levels)     Other Visit Diagnoses       Encounter for gynecological examination without abnormal finding [Z01.419]                ROS otherwise negative aside from what was mentioned above in HPI.    Vitals  /74   Pulse 86   Temp 35.9 °C (96.6 °F)   Ht 1.581 m (5' 2.25\")   Wt 58.5 kg (129 lb)   SpO2 99%   BMI 23.41 kg/m²   Body mass index is 23.41 kg/m².  Physical Exam  Gen: Alert, NAD  HEENT:  Unremarkable  Neck:  No ELA  Respiratory:  Lungs CTAB  Cardiovascular:  Heart RRR  Neuro:  Gross motor intact, sensory neuropathy LLE to mid lower leg  Skin:  No suspicious lesions present  Breast: No masses, or axillary lymphadenopathy  Gyn: Normal pelvic exam: no uterine masses or cervical lesions, or CMT; no vaginal D/C. No ovarian or adnexal masses; No external vaginal or anal/perineal lesions (Pt declined chaperone)      Patient Care Team:  Kathryn Kang MD as PCP - General (Internal Medicine)  Kathryn Kang MD as PCP - [employer] PCP  Abundio Garcia MD as Referring Physician (Neurology)  Myles BROWN" DO Deanne as Consulting Physician (Orthopaedic Surgery)       Activities of Daily Living  In your present state of health, do you have any difficulty performing the following activities?:   Preparing food and eating?: No  Bathing yourself: No  Getting dressed: No  Using the toilet:No  Moving around from place to place: No  In the past year have you fallen or had a near fall?:No  Able to manage finances independently: Yes  Able to perform grocery shopping: Yes  Able to manage medications independently: Yes  Able to do housework independently: Yes  Patient self-assessment of health status? Good    Current exercise habits: yes, active daily    Dietary issues discussed: Yes  Hearing difficulties: No  Safe in current home environment: Yes  Visual Acuity assessed: No  Cognitive Impairment No  Allergies and Medications  Fluoxetine, Niacin, Doxycycline, and Sulfa (sulfonamide antibiotics)  Current Outpatient Medications   Medication Instructions    albuterol 90 mcg/actuation inhaler 1-2 puffs, inhalation, Every 4 hours PRN    biotin 1 mg capsule 1 capsule, oral, Daily    cholecalciferol (Vitamin D-3) 25 MCG (1000 UT) tablet 1 tablet, oral, Daily    DULoxetine (CYMBALTA) 30 mg, oral, Daily, Do not crush or chew.    [id number]-algal oil (Metanx, algal oil,) 3 mg-35 mg-2 mg -90.314 mg capsule 2 capsules, oral, Daily    levothyroxine (SYNTHROID, LEVOXYL) 50 mcg, oral, Daily    loratadine (CLARITIN) 10 mg, oral, Daily    meloxicam (MOBIC) 15 mg, oral, Daily    multivitamin tablet 1 tablet, oral, Daily    pregabalin (LYRICA) 200 mg, oral, 3 times daily    simvastatin (Zocor) 40 mg tablet TAKE 1 TABLET EVERY DAY (NEW DOSE)    vitamin E 180 mg (400 unit) capsule 400-800 units daily       Medications and Supplements  prescribed by me and other practitioners or clinical pharmacist (such as prescriptions, OTC's, herbal therapies and supplements) were reviewed and documented in the medical record.      Active Problem  List  Patient Active Problem List   Diagnosis    Abnormality of pancreatic duct    Anxiety    Arthritis    History of depression    Disorder of bone density and structure, unspecified    Hepatic hemangioma    Hyperlipidemia, mixed    Hypothyroidism, adult    Lung nodule    Rosacea    Vitamin D deficiency    Advance directive discussed with patient    Cardiac risk counseling    H/O abnormal cervical Papanicolaou smear    H/O fracture of rib    Postmenopausal    Routine adult health maintenance    Screening for breast cancer    Screening for osteoporosis    Screening for multiple conditions    Encounter for screening mammogram for breast cancer    Esophageal ulcer    History of HPV infection    Impaired fasting glucose    Peripheral neuropathy    AGNES positive    Fibroma of left foot    Family history of pancreatic cancer    Screen for colon cancer    Menopause    Family history of melanoma       Comprehensive Medical/Surgical/Social/Family History  Past Medical History:   Diagnosis Date    Abnormal ultrasound of abdomen 11/15/2022    Comment on above: 10/21: 1. Right hepatic lobe lesion measuring up to 0.8 x 0.7cm x 0.7cm cm with imaging characteristics suggestive of a benign hemangioma.2. Previously noted left hepatic lobe lesion is not visualized in the present examination.3. Poor visualization of the pancreas.;    H/O bone density study     11/16/22: Lowest T score -1.5 10-Year Fracture Risk: Major Osteoporotic Fracture 13.4% Hip Fracture                1.7% 7/16: -1.5 8/20: -1.6, FRAX: Major Osteoporotic Fracture: 13.3%                             Hip Fracture:                1.6%    H/O chest x-ray 05/18/2023    normal    H/O CT scan     8/20: CT calcium score=0 POTENTIAL BUT NOT DEFINITIVE 4 MM SUBPLEURAL LEFT LOWER LOBE NONCALCIFIED NODULE VERSUS ATELECTASIS/SCAR. ACCORDING TO 2017 REVISION LUNG NODULE FOLLOW-UP GUIDELINES, EVEN IF THIS IS A HIGH RISK PATIENT (SMOKING OR MALIGNANCY HISTORY), FOLLOW-UP CHEST CT  IN ONE YEAR WOULD BE OPTIONAL    H/O CT scan of chest     3/2021: 1. Stable pulmonary (3-4mm)nodules as above. Per patient risk factors, 12 month follow-up could be obtained as clinically indicated.Pulmonary hyperinflation with emphysematous changes and pleuroparenchymal scarring. Stable prominence of the main pancreatic duct. (4mm), compared to CT . Subacute left-sided rib fractures with bony callus formation. Indeterminate hepatic lesions, 0.8-1cm.    H/O CT scan of chest 2023    Stable scattered pulmonary nodules measuring 4 mm or less in size. No new pulmonary nodule or mass    H/O diagnostic ultrasound     US RUQ: 10/21: 1. Right hepatic lobe lesion measuring up to 0.8 x 0.7cm x 0.7cm cm with imaging characteristics suggestive of a benign hemangioma. 2. Previously noted left hepatic lobe lesion is not visualized in the present examination. 3. Poor visualization of the pancreas.    H/O electromyography     H/O x-ray of lumbar spine 2024    Unremarkable radiographic evaluation of the lumbar spine.     Past Surgical History:   Procedure Laterality Date    CERVICAL BIOPSY  W/ LOOP ELECTRODE EXCISION       SECTION, CLASSIC      ESOPHAGOGASTRODUODENOSCOPY      HERNIA REPAIR       Social History     Social History Narrative    , 2 kids    Not working, babysits granchildren full time    Nonsmoker    Social ETOH    --    Family History:    M: Pancreatic CA , OP ( age 88)    F: CHF ( age 91)    S: thyroid    B:    B: Obesity, COPD/IPF, Psychiatric issues/OCD     Tobacco/Alcohol/Opioid use, as well as Illicit Drug Use was screened for/reviewed and documented in Social Documentation section of the chart and medication list as appropriate   (~5min spent discussing the above)    Depression Screening  Depression screening completed using the PHQ-2 questions, with results documented in the chart/encounter (~5min).  (See Rooming Screening section for documentation, and/or progress note  for additional information)    Cardiac Risk Assessment  The 10-year ASCVD risk score (Rogelio GELLER, et al., 2019) is: 5.5%    Values used to calculate the score:      Age: 69 years      Sex: Female      Is Non- : No      Diabetic: No      Tobacco smoker: No      Systolic Blood Pressure: 109 mmHg      Is BP treated: No      HDL Cholesterol: 63.2 mg/dL      Total Cholesterol: 205 mg/dL    Cardiovascular risk was discussed and, if needed, lifestyle modifications recommended, including nutritional choices, exercise, and elimination of habits contributing to risk. We agreed on a plan to reduce the current cardiovascular risk based on above discussion as needed.     Aspirin use/disuse was discussed and documented in the Problem List of the medical record (under Cardiac Risk Counseling) after reviewing the updated guidelines below:  Consider low dose Aspirin ( mg) use if the benefit for cardiovascular disease prevention outweighs risk for bleeding complications.   Discussed that in general, low dose ASA should be considered:  In patients WITHOUT prior MI/stroke/PAD (primary prevention):   a. Age <60: Use if 10-year cardiovascular disease risk >20%, with discussion of risks and benefits with patient  b. Age 60-<70: Use if 10-year cardiovascular disease risk >20% and low bleeding (e.g., gastrointestinal) risk, with discussion of risks and benefits with patient  c. Age >=70: Do not use    In patients WITH prior MI/stroke/PAD (secondary prevention):   Generally use unless extremely high bleeding (e.g., gastrointenstinal) risk, with discussion of risks and benefits with patient    Advance Directives Discussion  Advanced Care Planning (including a Living Will, Healthcare POA, as well as specific end of life choices and/or directives), was discussed with the patient and/or surrogate, voluntarily, and details of that discussion documented in the Problem List (under Advanced Directives Discussion) of the  medical record.    (~16 min spent discussing above)     During the course of the visit the patient was educated and counseled about age appropriate screening and preventive services.   Completed preventive screenings were documented in the chart (see Routine Health Maintenance in Problem List) and orders were placed for outstanding screenings/procedures as documented in the Assessment and Plan.    Patient Instructions (the written plan) was given to the patient at check out.

## 2024-04-18 NOTE — ASSESSMENT & PLAN NOTE
Annual Wellness exam completed   Preventive Health History reviewed  Ordered:   Labs    Mammogram   BMD   Cologuard

## 2024-05-01 ENCOUNTER — HOSPITAL ENCOUNTER (OUTPATIENT)
Dept: RADIOLOGY | Facility: CLINIC | Age: 69
Discharge: HOME | End: 2024-05-01
Payer: MEDICARE

## 2024-05-01 DIAGNOSIS — Z12.31 ENCOUNTER FOR SCREENING MAMMOGRAM FOR BREAST CANCER: ICD-10-CM

## 2024-05-01 PROCEDURE — 77063 BREAST TOMOSYNTHESIS BI: CPT | Performed by: RADIOLOGY

## 2024-05-01 PROCEDURE — 77067 SCR MAMMO BI INCL CAD: CPT

## 2024-05-01 PROCEDURE — 77067 SCR MAMMO BI INCL CAD: CPT | Performed by: RADIOLOGY

## 2024-05-02 ENCOUNTER — APPOINTMENT (OUTPATIENT)
Dept: RADIOLOGY | Facility: CLINIC | Age: 69
End: 2024-05-02
Payer: MEDICARE

## 2024-05-07 ENCOUNTER — HOSPITAL ENCOUNTER (OUTPATIENT)
Dept: RADIOLOGY | Facility: CLINIC | Age: 69
Discharge: HOME | End: 2024-05-07
Payer: MEDICARE

## 2024-05-07 DIAGNOSIS — G62.9 PERIPHERAL POLYNEUROPATHY: ICD-10-CM

## 2024-05-07 PROBLEM — M47.816 LUMBAR SPONDYLOSIS: Status: ACTIVE | Noted: 2024-05-07

## 2024-05-07 PROCEDURE — 72148 MRI LUMBAR SPINE W/O DYE: CPT

## 2024-05-07 PROCEDURE — 72148 MRI LUMBAR SPINE W/O DYE: CPT | Performed by: RADIOLOGY

## 2024-05-08 DIAGNOSIS — G62.9 PERIPHERAL POLYNEUROPATHY: ICD-10-CM

## 2024-05-08 RX ORDER — DULOXETIN HYDROCHLORIDE 60 MG/1
60 CAPSULE, DELAYED RELEASE ORAL DAILY
Qty: 90 CAPSULE | Refills: 3 | Status: SHIPPED | OUTPATIENT
Start: 2024-05-08 | End: 2025-05-08

## 2024-07-17 DIAGNOSIS — G62.9 PERIPHERAL POLYNEUROPATHY: ICD-10-CM

## 2024-07-17 RX ORDER — DULOXETIN HYDROCHLORIDE 60 MG/1
60 CAPSULE, DELAYED RELEASE ORAL DAILY
Qty: 90 CAPSULE | Refills: 3 | Status: SHIPPED | OUTPATIENT
Start: 2024-07-17

## 2024-07-23 ENCOUNTER — TELEMEDICINE (OUTPATIENT)
Dept: PRIMARY CARE | Facility: CLINIC | Age: 69
End: 2024-07-23
Payer: MEDICARE

## 2024-07-23 ENCOUNTER — APPOINTMENT (OUTPATIENT)
Dept: PRIMARY CARE | Facility: CLINIC | Age: 69
End: 2024-07-23
Payer: MEDICARE

## 2024-07-23 VITALS — TEMPERATURE: 95.5 F

## 2024-07-23 DIAGNOSIS — R19.7 DIARRHEA, UNSPECIFIED TYPE: ICD-10-CM

## 2024-07-23 DIAGNOSIS — R61 UNEXPLAINED NIGHT SWEATS: ICD-10-CM

## 2024-07-23 DIAGNOSIS — R68.89 FLU-LIKE SYMPTOMS: ICD-10-CM

## 2024-07-23 DIAGNOSIS — Q45.3 ABNORMALITY OF PANCREATIC DUCT: ICD-10-CM

## 2024-07-23 DIAGNOSIS — E03.9 HYPOTHYROIDISM, ADULT: ICD-10-CM

## 2024-07-23 DIAGNOSIS — R53.83 LETHARGY: Primary | ICD-10-CM

## 2024-07-23 DIAGNOSIS — Z80.0 FAMILY HISTORY OF PANCREATIC CANCER: ICD-10-CM

## 2024-07-23 PROCEDURE — 1036F TOBACCO NON-USER: CPT | Performed by: NURSE PRACTITIONER

## 2024-07-23 PROCEDURE — 1123F ACP DISCUSS/DSCN MKR DOCD: CPT | Performed by: NURSE PRACTITIONER

## 2024-07-23 PROCEDURE — 1160F RVW MEDS BY RX/DR IN RCRD: CPT | Performed by: NURSE PRACTITIONER

## 2024-07-23 PROCEDURE — 99215 OFFICE O/P EST HI 40 MIN: CPT | Performed by: NURSE PRACTITIONER

## 2024-07-23 PROCEDURE — G2212 PROLONG OUTPT/OFFICE VIS: HCPCS | Performed by: NURSE PRACTITIONER

## 2024-07-23 PROCEDURE — 1158F ADVNC CARE PLAN TLK DOCD: CPT | Performed by: NURSE PRACTITIONER

## 2024-07-23 PROCEDURE — 1159F MED LIST DOCD IN RCRD: CPT | Performed by: NURSE PRACTITIONER

## 2024-07-23 NOTE — ASSESSMENT & PLAN NOTE
Thyroid dysfunction exacerbation?    -  On synthroid, Check levels   Autoimmune disorder flare?    -  Positive teresa in Dec, night sweats, low body temp, mild HA could be autoimmune flare  -  repeat TERESA with inflammatory markers  Adrenal Insuffiencey or other endocrine disorder?    -  Low body temp, shaking & anxiety, fatigue, nausea, diarrhea, can be caused by this  - 24 hour urine and endocrine levels ordered  Pancreatic insufficiency/Chronic pancreatitis?  - Loose stool, weight loss, hx of ductal dilation  - check amylase, lipase, pancreatic elastase, calprotectin  Chronic infection of subacute bacterial endocarditis?    -  Blood cultures ordered  -  Stool studies  Paraneoplastic syndrome?    -  Has stable lung nodule, fhx of various cancers.  -  SPEP, UPEP, CBC  Medication side effect or interaction?    -  Watch with cymbalta and lyrica - both have side effect of nausea, diarrhea, CNS effects  Migraine variant (acephalic) ?    Reviewed HPI and work up with PCP, she is in agreement with plan of action  Will have her come in after blood work tomorrow for ECG and vitals

## 2024-07-23 NOTE — PROGRESS NOTES
An interactive audio and video telecommunication system which permits real time communications between the patient (at the originating site) and provider (at the distant site) was utilized to provide this telehealth service.  Verbal consent was requested and obtained from the patient for this telehealth visit.     Subjective   Patient ID: Elena Bueno is a 69 y.o. female who presents for Sick Visit.    Going on 6 weeks on and off; 3 episodes  Sx's start the same  - Day of or day before, becomes very lethargic   - Next day, diarrhea, nausea, sweats, chills ,shaking and feels very anxious   Generally lasts 1-2 days    This time started Sunday   - Slept most of the day Sunday, and Sunday night  - Slept a few hours Monday  - Slept on & off today, doesn't want to get up off couch, effort to brush teeth  With no appetite   Diarrhea   - starts brown and snake like  - becomes liquidy brown  - lasts until nothing left, bright greenish yellow scant  - lasts <24 hrs  - the normal BM until next episode occurs  - no abdominal pain surrounding the BMs  Sweating profusely at night, feeling hot, then freezing  - night sweats for awhile, vitamin e was working  - when not feeling well, night sweats are more aggressive  Clothes are damp, sheets are ok  Trying to stay hydrate but making her nauseous  Not throwing up   No fever, temp this morning was 95.5F oral  Lost few lbs over past week  129 in April, 121 lb today  Has not taken BP, no hx of HTN  Doesn't feel heart is racing  Slight headache Monday  Runny nose intermittent is normal for her due to allergies, not severe  Mild tickle cough intermittent, more recent  Hx of arthritis, no flares  Cymbalta started April for neuropathy (also takes lyrica)  Hx of hypothyroid, on synthroid, last tsh check April        Review of Systems  ROS completely negative except what was mentioned in the HPI.  Problem List, surgical, social, and family histories which were reviewed and updated as  necessary within the EMR. I also personally reviewed the notes, labs, and imaging that pertained to what was documented or discussed in the HPI.    Objective   Physical Exam  Vitals and nursing note reviewed.   Constitutional:       General: She is not in acute distress.     Appearance: Normal appearance.   HENT:      Head: Normocephalic and atraumatic.      Right Ear: External ear normal.      Left Ear: External ear normal.      Nose: Nose normal.      Mouth/Throat:      Mouth: Mucous membranes are moist.   Eyes:      Extraocular Movements: Extraocular movements intact.      Conjunctiva/sclera: Conjunctivae normal.      Pupils: Pupils are equal, round, and reactive to light.   Cardiovascular:      Rate and Rhythm: Normal rate and regular rhythm.      Heart sounds: Normal heart sounds.   Pulmonary:      Effort: Pulmonary effort is normal.      Breath sounds: Normal breath sounds.   Musculoskeletal:         General: Normal range of motion.      Cervical back: Normal range of motion and neck supple.   Skin:     General: Skin is warm and dry.   Neurological:      General: No focal deficit present.      Mental Status: She is alert and oriented to person, place, and time. Mental status is at baseline.   Psychiatric:         Mood and Affect: Mood normal.         Behavior: Behavior normal.         Thought Content: Thought content normal.         Judgment: Judgment normal.         Temp 35.3 °C (95.5 °F) (Oral)   LMP  (LMP Unknown)     Assessment/Plan    Problem List Items Addressed This Visit       Abnormality of pancreatic duct    Overview     3/2021 CT chest: Borderline dilated main pancreatic duct in the pancreatic body measuring up to 4 mm, however this appears similar compared to remote prior dated 05/03/2011.         Relevant Orders    Amylase    Lipase    Pancreatic Elastase, Fecal    Calprotectin Stool    Family history of pancreatic cancer    Relevant Orders    Amylase    Lipase    Pancreatic Elastase, Fecal     Calprotectin Stool    Hypothyroidism, adult    Overview     Started Thyroid medication 2022  labs monitored         Relevant Orders    TSH    T4, free    T3, free    PTH, intact    Thyroid Peroxidase (TPO) Antibody    Thyroglobulin and Antithyroglobulin    Lethargy - Primary    Overview     Going on 6 weeks on and off; 3 episodes  Sx's start the same  - Day of or day before, becomes very lethargic   - Next day, diarrhea, nausea, intense night sweats, chills, shaking and feels very anxious   Generally lasts 1-2 days         Current Assessment & Plan     Thyroid dysfunction exacerbation?    -  On synthroid, Check levels   Autoimmune disorder flare?    -  Positive teresa in Dec, night sweats, low body temp, mild HA could be autoimmune flare  -  repeat TERESA with inflammatory markers  Adrenal Insuffiencey or other endocrine disorder?    -  Low body temp, shaking & anxiety, fatigue, nausea, diarrhea, can be caused by this  - 24 hour urine and endocrine levels ordered  Pancreatic insufficiency/Chronic pancreatitis?  - Loose stool, weight loss, hx of ductal dilation  - check amylase, lipase, pancreatic elastase, calprotectin  Chronic infection of subacute bacterial endocarditis?    -  Blood cultures ordered  -  Stool studies  Paraneoplastic syndrome?    -  Has stable lung nodule, fhx of various cancers.  -  SPEP, UPEP, CBC  Medication side effect or interaction?    -  Watch with cymbalta and lyrica - both have side effect of nausea, diarrhea, CNS effects  Migraine variant (acephalic) ?    Reviewed HPI and work up with PCP, she is in agreement with plan of action  Will have her come in after blood work tomorrow for ECG and vitals         Relevant Orders    CBC and Auto Differential    Comprehensive Metabolic Panel    TSH    T4, free    T3, free    PTH, intact    Metanephrines, Fractionated, Urine (24 hour or Random)    Metanephrines Plasma    Catecholamines, Fractionated, Urine (24 hour or Random)    Cortisol AM    ACTH     DHEA-Sulfate    Aldosterone/Renin Activity Ratio    Blood Culture    C-reactive protein    Sedimentation Rate    Serum Protein Electrophoresis    Urine Protein Electrophoresis    Celiac Panel    Thyroid Peroxidase (TPO) Antibody    Thyroglobulin and Antithyroglobulin    Blood Culture    Urinalysis with Reflex Culture and Microscopic    AGNES with Reflex to NATE     Other Visit Diagnoses       Flu-like symptoms        Relevant Orders    CBC and Auto Differential    Comprehensive Metabolic Panel    TSH    T4, free    T3, free    PTH, intact    Metanephrines, Fractionated, Urine (24 hour or Random)    Metanephrines Plasma    Catecholamines, Fractionated, Urine (24 hour or Random)    Cortisol AM    ACTH    DHEA-Sulfate    Aldosterone/Renin Activity Ratio    Blood Culture    C-reactive protein    Sedimentation Rate    Serum Protein Electrophoresis    Urine Protein Electrophoresis    Amylase    Lipase    Ova/Para + Giardia/Cryptosporidium Antigen    Celiac Panel    Thyroid Peroxidase (TPO) Antibody    Thyroglobulin and Antithyroglobulin    Pancreatic Elastase, Fecal    Calprotectin Stool    Blood Culture    Stool Pathogen Panel, PCR    Urinalysis with Reflex Culture and Microscopic    AGNES with Reflex to NATE    Unexplained night sweats        Relevant Orders    CBC and Auto Differential    Comprehensive Metabolic Panel    TSH    T4, free    T3, free    PTH, intact    Metanephrines, Fractionated, Urine (24 hour or Random)    Metanephrines Plasma    Catecholamines, Fractionated, Urine (24 hour or Random)    Cortisol AM    ACTH    DHEA-Sulfate    Aldosterone/Renin Activity Ratio    Blood Culture    C-reactive protein    Sedimentation Rate    Serum Protein Electrophoresis    Urine Protein Electrophoresis    Thyroid Peroxidase (TPO) Antibody    Thyroglobulin and Antithyroglobulin    Blood Culture    Urinalysis with Reflex Culture and Microscopic    AGNES with Reflex to NATE    Diarrhea, unspecified type        Relevant Orders     Metanephrines, Fractionated, Urine (24 hour or Random)    Metanephrines Plasma    Catecholamines, Fractionated, Urine (24 hour or Random)    Cortisol AM    ACTH    DHEA-Sulfate    Aldosterone/Renin Activity Ratio    Blood Culture    C-reactive protein    Sedimentation Rate    Serum Protein Electrophoresis    Urine Protein Electrophoresis    Amylase    Lipase    Ova/Para + Giardia/Cryptosporidium Antigen    Celiac Panel    Pancreatic Elastase, Fecal    Calprotectin Stool    Stool Pathogen Panel, PCR    Urinalysis with Reflex Culture and Microscopic

## 2024-07-24 ENCOUNTER — OFFICE VISIT (OUTPATIENT)
Dept: PRIMARY CARE | Facility: CLINIC | Age: 69
End: 2024-07-24
Payer: MEDICARE

## 2024-07-24 ENCOUNTER — LAB (OUTPATIENT)
Dept: LAB | Facility: LAB | Age: 69
End: 2024-07-24
Payer: MEDICARE

## 2024-07-24 VITALS
TEMPERATURE: 97.7 F | HEART RATE: 75 BPM | WEIGHT: 122.2 LBS | BODY MASS INDEX: 22.35 KG/M2 | OXYGEN SATURATION: 98 % | SYSTOLIC BLOOD PRESSURE: 120 MMHG | DIASTOLIC BLOOD PRESSURE: 88 MMHG

## 2024-07-24 DIAGNOSIS — R19.5 ELEVATED FECAL CALPROTECTIN: ICD-10-CM

## 2024-07-24 DIAGNOSIS — R19.7 DIARRHEA, UNSPECIFIED TYPE: ICD-10-CM

## 2024-07-24 DIAGNOSIS — R79.89 ELEVATED MORNING SERUM CORTISOL LEVEL: Primary | ICD-10-CM

## 2024-07-24 DIAGNOSIS — R53.83 LETHARGY: ICD-10-CM

## 2024-07-24 DIAGNOSIS — E03.9 HYPOTHYROIDISM, ADULT: ICD-10-CM

## 2024-07-24 DIAGNOSIS — R53.83 LETHARGY: Primary | ICD-10-CM

## 2024-07-24 DIAGNOSIS — Q45.3 ABNORMALITY OF PANCREATIC DUCT: ICD-10-CM

## 2024-07-24 DIAGNOSIS — R68.89 FLU-LIKE SYMPTOMS: ICD-10-CM

## 2024-07-24 DIAGNOSIS — R61 UNEXPLAINED NIGHT SWEATS: ICD-10-CM

## 2024-07-24 DIAGNOSIS — Z80.0 FAMILY HISTORY OF PANCREATIC CANCER: ICD-10-CM

## 2024-07-24 LAB
ALBUMIN SERPL BCP-MCNC: 4.7 G/DL (ref 3.4–5)
ALP SERPL-CCNC: 90 U/L (ref 33–136)
ALT SERPL W P-5'-P-CCNC: 20 U/L (ref 7–45)
AMYLASE SERPL-CCNC: 71 U/L (ref 29–103)
ANION GAP SERPL CALC-SCNC: 14 MMOL/L (ref 10–20)
AST SERPL W P-5'-P-CCNC: 25 U/L (ref 9–39)
BASOPHILS # BLD AUTO: 0.04 X10*3/UL (ref 0–0.1)
BASOPHILS NFR BLD AUTO: 0.7 %
BILIRUB SERPL-MCNC: 0.5 MG/DL (ref 0–1.2)
BUN SERPL-MCNC: 18 MG/DL (ref 6–23)
CALCIUM SERPL-MCNC: 9.9 MG/DL (ref 8.6–10.3)
CHLORIDE SERPL-SCNC: 102 MMOL/L (ref 98–107)
CO2 SERPL-SCNC: 27 MMOL/L (ref 21–32)
CORTIS AM PEAK SERPL-MSCNC: 24.7 UG/DL (ref 5–20)
CREAT SERPL-MCNC: 0.87 MG/DL (ref 0.5–1.05)
CRP SERPL-MCNC: 0.28 MG/DL
DHEA-S SERPL-MCNC: 20 UG/DL (ref 13–130)
EGFRCR SERPLBLD CKD-EPI 2021: 73 ML/MIN/1.73M*2
EOSINOPHIL # BLD AUTO: 0.09 X10*3/UL (ref 0–0.7)
EOSINOPHIL NFR BLD AUTO: 1.5 %
ERYTHROCYTE [DISTWIDTH] IN BLOOD BY AUTOMATED COUNT: 14.2 % (ref 11.5–14.5)
ERYTHROCYTE [SEDIMENTATION RATE] IN BLOOD BY WESTERGREN METHOD: 13 MM/H (ref 0–30)
GLIADIN PEPTIDE IGA SER IA-ACNC: <1 U/ML
GLUCOSE SERPL-MCNC: 108 MG/DL (ref 74–99)
HCT VFR BLD AUTO: 44.8 % (ref 36–46)
HGB BLD-MCNC: 14.4 G/DL (ref 12–16)
IMM GRANULOCYTES # BLD AUTO: 0.01 X10*3/UL (ref 0–0.7)
IMM GRANULOCYTES NFR BLD AUTO: 0.2 % (ref 0–0.9)
LIPASE SERPL-CCNC: 23 U/L (ref 9–82)
LYMPHOCYTES # BLD AUTO: 2.78 X10*3/UL (ref 1.2–4.8)
LYMPHOCYTES NFR BLD AUTO: 47.5 %
MCH RBC QN AUTO: 30 PG (ref 26–34)
MCHC RBC AUTO-ENTMCNC: 32.1 G/DL (ref 32–36)
MCV RBC AUTO: 93 FL (ref 80–100)
MONOCYTES # BLD AUTO: 0.36 X10*3/UL (ref 0.1–1)
MONOCYTES NFR BLD AUTO: 6.2 %
NEUTROPHILS # BLD AUTO: 2.57 X10*3/UL (ref 1.2–7.7)
NEUTROPHILS NFR BLD AUTO: 43.9 %
NRBC BLD-RTO: 0 /100 WBCS (ref 0–0)
PLATELET # BLD AUTO: 205 X10*3/UL (ref 150–450)
POTASSIUM SERPL-SCNC: 3.9 MMOL/L (ref 3.5–5.3)
PROT SERPL-MCNC: 7.1 G/DL (ref 6.4–8.2)
PROT SERPL-MCNC: 7.2 G/DL (ref 6.4–8.2)
PTH-INTACT SERPL-MCNC: 77.7 PG/ML (ref 18.5–88)
RBC # BLD AUTO: 4.8 X10*6/UL (ref 4–5.2)
SODIUM SERPL-SCNC: 139 MMOL/L (ref 136–145)
T3FREE SERPL-MCNC: 2.9 PG/ML (ref 2.3–4.2)
T4 FREE SERPL-MCNC: 0.82 NG/DL (ref 0.61–1.12)
THYROPEROXIDASE AB SERPL-ACNC: 50 IU/ML
TSH SERPL-ACNC: 2.67 MIU/L (ref 0.44–3.98)
TTG IGA SER IA-ACNC: <1 U/ML
WBC # BLD AUTO: 5.9 X10*3/UL (ref 4.4–11.3)

## 2024-07-24 PROCEDURE — 84432 ASSAY OF THYROGLOBULIN: CPT

## 2024-07-24 PROCEDURE — 1036F TOBACCO NON-USER: CPT | Performed by: NURSE PRACTITIONER

## 2024-07-24 PROCEDURE — 82150 ASSAY OF AMYLASE: CPT

## 2024-07-24 PROCEDURE — 1123F ACP DISCUSS/DSCN MKR DOCD: CPT | Performed by: NURSE PRACTITIONER

## 2024-07-24 PROCEDURE — 85652 RBC SED RATE AUTOMATED: CPT

## 2024-07-24 PROCEDURE — 83516 IMMUNOASSAY NONANTIBODY: CPT

## 2024-07-24 PROCEDURE — 82088 ASSAY OF ALDOSTERONE: CPT

## 2024-07-24 PROCEDURE — 86225 DNA ANTIBODY NATIVE: CPT

## 2024-07-24 PROCEDURE — 87040 BLOOD CULTURE FOR BACTERIA: CPT

## 2024-07-24 PROCEDURE — 83690 ASSAY OF LIPASE: CPT

## 2024-07-24 PROCEDURE — 86376 MICROSOMAL ANTIBODY EACH: CPT

## 2024-07-24 PROCEDURE — 84155 ASSAY OF PROTEIN SERUM: CPT

## 2024-07-24 PROCEDURE — 83835 ASSAY OF METANEPHRINES: CPT

## 2024-07-24 PROCEDURE — 84439 ASSAY OF FREE THYROXINE: CPT

## 2024-07-24 PROCEDURE — 1160F RVW MEDS BY RX/DR IN RCRD: CPT | Performed by: NURSE PRACTITIONER

## 2024-07-24 PROCEDURE — 85025 COMPLETE CBC W/AUTO DIFF WBC: CPT

## 2024-07-24 PROCEDURE — 1159F MED LIST DOCD IN RCRD: CPT | Performed by: NURSE PRACTITIONER

## 2024-07-24 PROCEDURE — 93000 ELECTROCARDIOGRAM COMPLETE: CPT | Performed by: NURSE PRACTITIONER

## 2024-07-24 PROCEDURE — 99215 OFFICE O/P EST HI 40 MIN: CPT | Performed by: NURSE PRACTITIONER

## 2024-07-24 PROCEDURE — 86618 LYME DISEASE ANTIBODY: CPT

## 2024-07-24 PROCEDURE — 82627 DEHYDROEPIANDROSTERONE: CPT

## 2024-07-24 PROCEDURE — 1158F ADVNC CARE PLAN TLK DOCD: CPT | Performed by: NURSE PRACTITIONER

## 2024-07-24 PROCEDURE — 84481 FREE ASSAY (FT-3): CPT

## 2024-07-24 PROCEDURE — 82024 ASSAY OF ACTH: CPT

## 2024-07-24 PROCEDURE — G2211 COMPLEX E/M VISIT ADD ON: HCPCS | Performed by: NURSE PRACTITIONER

## 2024-07-24 PROCEDURE — 86800 THYROGLOBULIN ANTIBODY: CPT

## 2024-07-24 PROCEDURE — 84443 ASSAY THYROID STIM HORMONE: CPT

## 2024-07-24 PROCEDURE — 86140 C-REACTIVE PROTEIN: CPT

## 2024-07-24 PROCEDURE — 36415 COLL VENOUS BLD VENIPUNCTURE: CPT

## 2024-07-24 PROCEDURE — 87075 CULTR BACTERIA EXCEPT BLOOD: CPT

## 2024-07-24 PROCEDURE — 82533 TOTAL CORTISOL: CPT

## 2024-07-24 PROCEDURE — 80053 COMPREHEN METABOLIC PANEL: CPT

## 2024-07-24 PROCEDURE — 86235 NUCLEAR ANTIGEN ANTIBODY: CPT

## 2024-07-24 PROCEDURE — 83970 ASSAY OF PARATHORMONE: CPT

## 2024-07-24 PROCEDURE — 84165 PROTEIN E-PHORESIS SERUM: CPT

## 2024-07-24 PROCEDURE — 86038 ANTINUCLEAR ANTIBODIES: CPT

## 2024-07-24 RX ORDER — DEXAMETHASONE 1 MG/1
1 TABLET ORAL ONCE
Qty: 1 TABLET | Refills: 0 | Status: SHIPPED | OUTPATIENT
Start: 2024-07-24 | End: 2024-07-24

## 2024-07-24 ASSESSMENT — PATIENT HEALTH QUESTIONNAIRE - PHQ9
1. LITTLE INTEREST OR PLEASURE IN DOING THINGS: NOT AT ALL
2. FEELING DOWN, DEPRESSED OR HOPELESS: NOT AT ALL
SUM OF ALL RESPONSES TO PHQ9 QUESTIONS 1 AND 2: 0

## 2024-07-24 NOTE — ASSESSMENT & PLAN NOTE
See 7/23/24 detailed note  Exam unremarkable today  BP mildly elevated  ECG NSR  Wait on lab results  Discussed history of bilateral peripheral neuropathy of unknown origin  If labs do not point to a cause of these episode, would consider MRI of brain to rule out lesion  ------  Copied from 7/23/24  Thyroid dysfunction exacerbation?    -  On synthroid, Check levels   Autoimmune disorder flare?    -  Positive teresa in Dec, night sweats, low body temp, mild HA could be autoimmune flare  -  repeat TERESA with inflammatory markers  Adrenal Insuffiencey or other endocrine disorder?    -  Low body temp, shaking & anxiety, fatigue, nausea, diarrhea, can be caused by this  - 24 hour urine and endocrine levels ordered  Pancreatic insufficiency/Chronic pancreatitis?  - Loose stool, weight loss, hx of ductal dilation  - check amylase, lipase, pancreatic elastase, calprotectin  Chronic infection of subacute bacterial endocarditis?    -  Blood cultures ordered  -  Stool studies  Paraneoplastic syndrome?    -  Has stable lung nodule, fhx of various cancers.  -  SPEP, UPEP, CBC  Medication side effect or interaction?    -  Watch with cymbalta and lyrica - both have side effect of nausea, diarrhea, CNS effects  Migraine variant (acephalic) ?

## 2024-07-24 NOTE — PROGRESS NOTES
Subjective   Patient ID: Elena Bueno is a 69 y.o. female who presents for Follow-up (Pt has no concerns ).    See note from 7/23/24 for further detailed HPI  ------  Now recalls an episode last year  Lethargy followed by diarrhea, intense heat and sweating, and shaking, anxiety  That was her worst episode  Today, feeling better, severe lethargy ended    Little over a year ago, had neuropathy  Bilateral feet  Tingling discomfort  No hx of diabetes  Took vitamin b, didn't help  PCP did blood work  B was too elevated, so stopped vitamin  Took Metanx, helped for a little time  Right foot resolved, left foot worsened and went into calf with pain  Sharp pains in 2nd toe, pins and needles in calf and foot  Dr Garcia, did EMG, not conclusive  Increased biotin, lyrica TID  PCP ordered MRI of lumbar, normal  Added cymbalta to lyrica        Review of Systems  ROS completely negative except what was mentioned in the HPI.  Problem List, surgical, social, and family histories which were reviewed and updated as necessary within the EMR. I also personally reviewed the notes, labs, and imaging that pertained to what was documented or discussed in the HPI.    Objective   Physical Exam  Vitals and nursing note reviewed.   Constitutional:       General: She is not in acute distress.     Appearance: Normal appearance.   HENT:      Head: Normocephalic and atraumatic.      Right Ear: Tympanic membrane, ear canal and external ear normal.      Left Ear: Tympanic membrane, ear canal and external ear normal.      Nose: Nose normal. No congestion.      Mouth/Throat:      Mouth: Mucous membranes are moist.      Pharynx: Oropharynx is clear.   Eyes:      Extraocular Movements: Extraocular movements intact.      Conjunctiva/sclera: Conjunctivae normal.      Pupils: Pupils are equal, round, and reactive to light.   Neck:      Vascular: No carotid bruit.   Cardiovascular:      Rate and Rhythm: Normal rate and regular rhythm.      Pulses: Normal  pulses.      Heart sounds: Normal heart sounds.   Pulmonary:      Effort: Pulmonary effort is normal.      Breath sounds: Normal breath sounds.   Abdominal:      General: Abdomen is flat. Bowel sounds are normal.      Palpations: Abdomen is soft.      Tenderness: There is no abdominal tenderness.   Musculoskeletal:         General: Normal range of motion.      Cervical back: Normal range of motion and neck supple. No tenderness.   Lymphadenopathy:      Cervical: No cervical adenopathy.   Skin:     General: Skin is warm and dry.      Capillary Refill: Capillary refill takes less than 2 seconds.   Neurological:      General: No focal deficit present.      Mental Status: She is alert and oriented to person, place, and time. Mental status is at baseline.      Cranial Nerves: No cranial nerve deficit.      Sensory: No sensory deficit.      Motor: No weakness.      Coordination: Coordination normal.      Gait: Gait normal.      Deep Tendon Reflexes: Reflexes normal.   Psychiatric:         Mood and Affect: Mood normal.         Behavior: Behavior normal.         Thought Content: Thought content normal.         Judgment: Judgment normal.         /88   Pulse 75   Temp 36.5 °C (97.7 °F) (Oral)   Wt 55.4 kg (122 lb 3.2 oz)   LMP  (LMP Unknown)   SpO2 98%   BMI 22.35 kg/m²     Assessment/Plan    Problem List Items Addressed This Visit       Lethargy - Primary    Overview     Happened once last year; recent 3 episodes happened within 6 weeks  Sx's start the same  - Day of or day before, becomes very lethargic   - Next day, diarrhea, nausea, intense night sweats, chills, shaking and feels very anxious/impending doom  Generally lasts 1-2 days         Current Assessment & Plan     See 7/23/24 detailed note  Exam unremarkable today  BP mildly elevated  ECG NSR  Wait on lab results  Discussed history of bilateral peripheral neuropathy of unknown origin  If labs do not point to a cause of these episode, would consider MRI  of brain to rule out lesion  ------  Copied from 7/23/24  Thyroid dysfunction exacerbation?    -  On synthroid, Check levels   Autoimmune disorder flare?    -  Positive teresa in Dec, night sweats, low body temp, mild HA could be autoimmune flare  -  repeat TERESA with inflammatory markers  Adrenal Insuffiencey or other endocrine disorder?    -  Low body temp, shaking & anxiety, fatigue, nausea, diarrhea, can be caused by this  - 24 hour urine and endocrine levels ordered  Pancreatic insufficiency/Chronic pancreatitis?  - Loose stool, weight loss, hx of ductal dilation  - check amylase, lipase, pancreatic elastase, calprotectin  Chronic infection of subacute bacterial endocarditis?    -  Blood cultures ordered  -  Stool studies  Paraneoplastic syndrome?    -  Has stable lung nodule, fhx of various cancers.  -  SPEP, UPEP, CBC  Medication side effect or interaction?    -  Watch with cymbalta and lyrica - both have side effect of nausea, diarrhea, CNS effects  Migraine variant (acephalic) ?         Relevant Orders    ECG 12 lead (Clinic Performed) (Completed)    LYME (B. BURGDORFERI) AB MODIFIED 2-TITER TESTING, WITH REFLEX TO IGM AND IGG BY NIKKIE     Other Visit Diagnoses       Flu-like symptoms        Relevant Orders    ECG 12 lead (Clinic Performed) (Completed)    LYME (B. BURGDORFERI) AB MODIFIED 2-TITER TESTING, WITH REFLEX TO IGM AND IGG BY NIKKIE    Unexplained night sweats        Relevant Orders    ECG 12 lead (Clinic Performed) (Completed)    LYME (B. BURGDORFERI) AB MODIFIED 2-TITER TESTING, WITH REFLEX TO IGM AND IGG BY NIKKIE

## 2024-07-25 ENCOUNTER — LAB (OUTPATIENT)
Dept: LAB | Facility: LAB | Age: 69
End: 2024-07-25
Payer: MEDICARE

## 2024-07-25 DIAGNOSIS — R79.89 ELEVATED MORNING SERUM CORTISOL LEVEL: ICD-10-CM

## 2024-07-25 DIAGNOSIS — R19.7 DIARRHEA, UNSPECIFIED TYPE: ICD-10-CM

## 2024-07-25 DIAGNOSIS — R53.83 LETHARGY: ICD-10-CM

## 2024-07-25 DIAGNOSIS — Z80.0 FAMILY HISTORY OF PANCREATIC CANCER: ICD-10-CM

## 2024-07-25 DIAGNOSIS — R61 UNEXPLAINED NIGHT SWEATS: ICD-10-CM

## 2024-07-25 DIAGNOSIS — R68.89 FLU-LIKE SYMPTOMS: ICD-10-CM

## 2024-07-25 DIAGNOSIS — Q45.3 ABNORMALITY OF PANCREATIC DUCT: ICD-10-CM

## 2024-07-25 LAB
ACTH PLAS-MCNC: 12.2 PG/ML (ref 7.2–63.3)
ALBUMIN: 4.6 G/DL (ref 3.4–5)
ALPHA 1 GLOBULIN: 0.3 G/DL (ref 0.2–0.6)
ALPHA 2 GLOBULIN: 0.7 G/DL (ref 0.4–1.1)
APPEARANCE UR: CLEAR
BACTERIA #/AREA URNS AUTO: ABNORMAL /HPF
BETA GLOBULIN: 0.8 G/DL (ref 0.5–1.2)
BILIRUB UR STRIP.AUTO-MCNC: NEGATIVE MG/DL
BILL ONLY-THYROGLOBULIN: NORMAL
C COLI+JEJ+UPSA DNA STL QL NAA+PROBE: NOT DETECTED
COLOR UR: ABNORMAL
EC STX1 GENE STL QL NAA+PROBE: NOT DETECTED
EC STX2 GENE STL QL NAA+PROBE: NOT DETECTED
GAMMA GLOBULIN: 0.8 G/DL (ref 0.5–1.4)
GLUCOSE UR STRIP.AUTO-MCNC: NORMAL MG/DL
HOLD SPECIMEN: NORMAL
KETONES UR STRIP.AUTO-MCNC: NEGATIVE MG/DL
LEUKOCYTE ESTERASE UR QL STRIP.AUTO: ABNORMAL
MUCOUS THREADS #/AREA URNS AUTO: ABNORMAL /LPF
NITRITE UR QL STRIP.AUTO: NEGATIVE
NOROVIRUS GI + GII RNA STL NAA+PROBE: NOT DETECTED
PATH REVIEW-SERUM PROTEIN ELECTROPHORESIS: NORMAL
PH UR STRIP.AUTO: 6.5 [PH]
PROT UR STRIP.AUTO-MCNC: NEGATIVE MG/DL
PROT UR-ACNC: 9 MG/DL (ref 5–25)
PROTEIN ELECTROPHORESIS COMMENT: NORMAL
RBC # UR STRIP.AUTO: ABNORMAL /UL
RBC #/AREA URNS AUTO: ABNORMAL /HPF
RV RNA STL NAA+PROBE: NOT DETECTED
SALMONELLA DNA STL QL NAA+PROBE: NOT DETECTED
SHIGELLA DNA SPEC QL NAA+PROBE: NOT DETECTED
SP GR UR STRIP.AUTO: 1.02
THYROGLOB AB SERPL-ACNC: <0.9 IU/ML (ref 0–4)
THYROGLOB SERPL-MCNC: 1.2 NG/ML (ref 1.3–31.8)
THYROGLOB SERPL-MCNC: ABNORMAL NG/ML (ref 1.3–31.8)
UROBILINOGEN UR STRIP.AUTO-MCNC: NORMAL MG/DL
V CHOLERAE DNA STL QL NAA+PROBE: NOT DETECTED
WBC #/AREA URNS AUTO: ABNORMAL /HPF
Y ENTEROCOL DNA STL QL NAA+PROBE: NOT DETECTED

## 2024-07-25 PROCEDURE — 36415 COLL VENOUS BLD VENIPUNCTURE: CPT

## 2024-07-25 PROCEDURE — 84166 PROTEIN E-PHORESIS/URINE/CSF: CPT

## 2024-07-25 PROCEDURE — 82384 ASSAY THREE CATECHOLAMINES: CPT

## 2024-07-25 PROCEDURE — 87506 IADNA-DNA/RNA PROBE TQ 6-11: CPT

## 2024-07-25 PROCEDURE — 82653 EL-1 FECAL QUANTITATIVE: CPT

## 2024-07-25 PROCEDURE — 87328 CRYPTOSPORIDIUM AG IA: CPT

## 2024-07-25 PROCEDURE — 87329 GIARDIA AG IA: CPT

## 2024-07-25 PROCEDURE — 84156 ASSAY OF PROTEIN URINE: CPT

## 2024-07-25 PROCEDURE — 83993 ASSAY FOR CALPROTECTIN FECAL: CPT

## 2024-07-25 PROCEDURE — 87086 URINE CULTURE/COLONY COUNT: CPT

## 2024-07-25 PROCEDURE — 81001 URINALYSIS AUTO W/SCOPE: CPT

## 2024-07-25 PROCEDURE — 83835 ASSAY OF METANEPHRINES: CPT

## 2024-07-26 ENCOUNTER — E-CONSULT (OUTPATIENT)
Dept: ENDOCRINOLOGY | Facility: CLINIC | Age: 69
End: 2024-07-26
Payer: MEDICARE

## 2024-07-26 LAB
ALDOST SERPL-MCNC: 10.1 NG/DL
ALDOST/RENIN PLAS-RTO: 5.6 RATIO
ANA PATTERN: ABNORMAL
ANA SER QL HEP2 SUBST: POSITIVE
ANA TITR SER IF: ABNORMAL {TITER}
B BURGDOR.VLSE1+PEPC10 AB SER IA-ACNC: 0.81 IV
CENTROMERE B AB SER-ACNC: <0.2 AI
CHROMATIN AB SERPL-ACNC: <0.2 AI
DSDNA AB SER-ACNC: <1 IU/ML
ENA JO1 AB SER QL IA: <0.2 AI
ENA RNP AB SER IA-ACNC: <0.2 AI
ENA SCL70 AB SER QL IA: <0.2 AI
ENA SM AB SER IA-ACNC: <0.2 AI
ENA SM+RNP AB SER QL IA: <0.2 AI
ENA SS-A AB SER IA-ACNC: <0.2 AI
ENA SS-B AB SER IA-ACNC: <0.2 AI
GLIADIN PEPTIDE IGG SER IA-ACNC: <0.56 FLU (ref 0–4.99)
RENIN PLAS-CCNC: 1.8 NG/ML/HR
RIBOSOMAL P AB SER-ACNC: <0.2 AI
TTG IGG SER IA-ACNC: <0.82 FLU (ref 0–4.99)

## 2024-07-26 NOTE — PROGRESS NOTES
Latest Reference Range & Units 12/28/23 09:05 04/04/24 08:52 07/24/24 07:16   Thyroxine, Free 0.61 - 1.12 ng/dL   0.82   Thyroid Stimulating Hormone 0.44 - 3.98 mIU/L 2.47 3.02 2.67   Triiodothyronine, Free 2.3 - 4.2 pg/mL   2.9   Thyroglobulin 1.3 - 31.8 ng/mL   1.2 (L)   Thyroglobulin LC-MS/MS 1.3 - 31.8 ng/mL   Not Applicable   Thyroid Peroxidase (TPO) Antibody <=60 IU/mL   50   Anti-Thyroglobulin AB 0.0 - 4.0 IU/mL   <0.9   (L): Data is abnormally low    E-consult regarding a low Tg.  Her thyroid labs are normal, her TPO antibody is normal.  There is no reason to ever check a thyroglobulin unless following thyroid cancer management long term.  Having a low Tg is not reflective of having any thyroid dysfunction.  As long as the TSH is normal, she is euthyroid.  Sergey Brennan MD

## 2024-07-27 LAB
ALBUMIN MFR UR ELPH: 36.1 %
ALPHA1 GLOB MFR UR ELPH: 11.8 %
ALPHA2 GLOB MFR UR ELPH: 13.3 %
ANNOTATION COMMENT IMP: NORMAL
B-GLOBULIN MFR UR ELPH: 23.7 %
BACTERIA UR CULT: NORMAL
CRYPTOSP AG STL QL IA: NEGATIVE
G LAMBLIA AG STL QL IA: NEGATIVE
GAMMA GLOB MFR UR ELPH: 15.1 %
METANEPHS SERPL-SCNC: 0.15 NMOL/L (ref 0–0.49)
NORMETANEPH FREE SERPL-SCNC: 0.63 NMOL/L (ref 0–0.89)
PATH REVIEW-URINE PROTEIN ELECTROPHORESIS: NORMAL
URINE ELECTROPHORESIS COMMENT: NORMAL

## 2024-07-28 LAB
BACTERIA BLD CULT: NORMAL
BACTERIA BLD CULT: NORMAL
CALPROTECTIN STL-MCNT: 64 UG/G
ELASTASE PANC STL-MCNT: 478 UG/G

## 2024-07-29 ENCOUNTER — LAB (OUTPATIENT)
Dept: LAB | Facility: LAB | Age: 69
End: 2024-07-29
Payer: MEDICARE

## 2024-07-29 DIAGNOSIS — R19.7 DIARRHEA, UNSPECIFIED TYPE: ICD-10-CM

## 2024-07-29 DIAGNOSIS — R68.89 FLU-LIKE SYMPTOMS: ICD-10-CM

## 2024-07-29 DIAGNOSIS — Q45.3 ABNORMALITY OF PANCREATIC DUCT: ICD-10-CM

## 2024-07-29 DIAGNOSIS — R61 UNEXPLAINED NIGHT SWEATS: ICD-10-CM

## 2024-07-29 DIAGNOSIS — Z80.0 FAMILY HISTORY OF PANCREATIC CANCER: ICD-10-CM

## 2024-07-29 DIAGNOSIS — R53.83 LETHARGY: ICD-10-CM

## 2024-07-29 DIAGNOSIS — R79.89 ELEVATED MORNING SERUM CORTISOL LEVEL: ICD-10-CM

## 2024-07-29 LAB
CATECHOLS UR-IMP: ABNORMAL
COLLECT DURATION TIME SPEC: 24 HR
CORTIS AM PEAK SERPL-MSCNC: 1.6 UG/DL (ref 5–20)
CREAT 24H UR-MRATE: 978 MG/D (ref 500–1400)
CREAT UR-MCNC: 49 MG/DL
DHEA-S SERPL-MCNC: 10 UG/DL (ref 13–130)
DOPAMINE 24H UR-MRATE: 142 UG/D (ref 71–485)
DOPAMINE UR-MCNC: 71 UG/L
DOPAMINE/CREAT UR: 145 UG/G CRT (ref 0–250)
EPINEPH 24H UR-MRATE: 6 UG/D (ref 1–14)
EPINEPH UR-MCNC: 3 UG/L
EPINEPH/CREAT UR: 6 UG/G CRT (ref 0–20)
NOREPINEPH 24H UR-MRATE: 60 UG/D (ref 14–120)
NOREPINEPH UR-MCNC: 30 UG/L
NOREPINEPH/CREAT UR: 61 UG/G CRT (ref 0–45)
SPECIMEN VOL ?TM UR: 1996 ML

## 2024-07-29 PROCEDURE — 82533 TOTAL CORTISOL: CPT

## 2024-07-29 PROCEDURE — 82627 DEHYDROEPIANDROSTERONE: CPT

## 2024-07-30 LAB — ACTH PLAS-MCNC: 1.6 PG/ML (ref 7.2–63.3)

## 2024-07-31 LAB — O+P STL MICRO: NEGATIVE

## 2024-08-02 LAB
COLLECT DURATION TIME SPEC: 24 HR
CREAT 24H UR-MRATE: 978 MG/D (ref 500–1400)
CREAT UR-MCNC: 49 MG/DL
DEXAMETHASONE SERPL-MCNC: 247.2 NG/DL
METANEPH 24H UR-MCNC: 43 UG/L
METANEPH 24H UR-MRATE: 86 UG/D (ref 36–229)
METANEPH+NORMETANEPH UR-IMP: NORMAL
METANEPH/CREAT 24H UR: 88 UG/G CRT (ref 0–300)
NORMETANEPHRINE 24H UR-MCNC: 149 UG/L
NORMETANEPHRINE 24H UR-MRATE: 297 UG/D (ref 95–650)
NORMETANEPHRINE/CREAT 24H UR: 304 UG/G CRT (ref 0–400)
SPECIMEN VOL ?TM UR: 1996 ML

## 2024-08-14 DIAGNOSIS — R19.7 DIARRHEA, UNSPECIFIED TYPE: ICD-10-CM

## 2024-08-14 DIAGNOSIS — R61 UNEXPLAINED NIGHT SWEATS: Primary | ICD-10-CM

## 2024-08-15 ENCOUNTER — LAB (OUTPATIENT)
Dept: LAB | Facility: LAB | Age: 69
End: 2024-08-15
Payer: MEDICARE

## 2024-08-15 DIAGNOSIS — R19.7 DIARRHEA, UNSPECIFIED TYPE: ICD-10-CM

## 2024-08-15 DIAGNOSIS — R61 UNEXPLAINED NIGHT SWEATS: ICD-10-CM

## 2024-08-15 PROCEDURE — 82542 COL CHROMOTOGRAPHY QUAL/QUAN: CPT

## 2024-08-15 PROCEDURE — 82308 ASSAY OF CALCITONIN: CPT

## 2024-08-15 PROCEDURE — 83497 ASSAY OF 5-HIAA: CPT

## 2024-08-15 PROCEDURE — 36415 COLL VENOUS BLD VENIPUNCTURE: CPT

## 2024-08-15 PROCEDURE — 84307 ASSAY OF SOMATOSTATIN: CPT

## 2024-08-16 LAB — GASTRIN SERPL-MCNC: 39 PG/ML (ref 0–100)

## 2024-08-17 LAB — CALCIT SERPL-MCNC: <2 PG/ML (ref 0–5.1)

## 2024-08-19 ENCOUNTER — LAB (OUTPATIENT)
Dept: LAB | Facility: LAB | Age: 69
End: 2024-08-19
Payer: MEDICARE

## 2024-08-19 DIAGNOSIS — R19.7 DIARRHEA, UNSPECIFIED TYPE: ICD-10-CM

## 2024-08-19 DIAGNOSIS — E80.21 ACUTE INTERMITTENT PORPHYRIA (MULTI): Primary | ICD-10-CM

## 2024-08-19 DIAGNOSIS — R61 UNEXPLAINED NIGHT SWEATS: ICD-10-CM

## 2024-08-19 DIAGNOSIS — R53.83 LETHARGY: ICD-10-CM

## 2024-08-19 PROCEDURE — 83497 ASSAY OF 5-HIAA: CPT

## 2024-08-19 PROCEDURE — 84120 ASSAY OF URINE PORPHYRINS: CPT

## 2024-08-20 ENCOUNTER — TELEPHONE (OUTPATIENT)
Dept: PRIMARY CARE | Facility: CLINIC | Age: 69
End: 2024-08-20
Payer: MEDICARE

## 2024-08-20 DIAGNOSIS — R19.7 DIARRHEA, UNSPECIFIED TYPE: Primary | ICD-10-CM

## 2024-08-20 DIAGNOSIS — R23.2 ABNORMAL FLUSHING AND SWEATING: ICD-10-CM

## 2024-08-20 DIAGNOSIS — R61 ABNORMAL FLUSHING AND SWEATING: ICD-10-CM

## 2024-08-20 NOTE — TELEPHONE ENCOUNTER
Patient called and stated that she is having another episode of the diarrhea and the bouts seem to happening closer together. She stated it starts with sweats and then she gets cold/chilled. It is making her very anxious and her anxiety is getting worse.  Please advise

## 2024-08-20 NOTE — TELEPHONE ENCOUNTER
Spoke to patient.  Relayed the message and recommendation.  I gave her the information regarding the GI dr and she will let us know when she gets the appointment.

## 2024-08-21 ENCOUNTER — TELEPHONE (OUTPATIENT)
Dept: GASTROENTEROLOGY | Facility: CLINIC | Age: 69
End: 2024-08-21

## 2024-08-21 ENCOUNTER — TELEPHONE (OUTPATIENT)
Dept: PRIMARY CARE | Facility: CLINIC | Age: 69
End: 2024-08-21

## 2024-08-21 ENCOUNTER — TELEMEDICINE (OUTPATIENT)
Dept: PRIMARY CARE | Facility: CLINIC | Age: 69
End: 2024-08-21
Payer: MEDICARE

## 2024-08-21 DIAGNOSIS — E80.20 PORPHYRIA, UNSPECIFIED PORPHYRIA TYPE (MULTI): Primary | ICD-10-CM

## 2024-08-21 DIAGNOSIS — R53.83 LETHARGY: ICD-10-CM

## 2024-08-21 DIAGNOSIS — R61 ABNORMAL FLUSHING AND SWEATING: ICD-10-CM

## 2024-08-21 DIAGNOSIS — R61 UNEXPLAINED NIGHT SWEATS: ICD-10-CM

## 2024-08-21 DIAGNOSIS — R23.2 ABNORMAL FLUSHING AND SWEATING: ICD-10-CM

## 2024-08-21 DIAGNOSIS — R19.7 DIARRHEA, UNSPECIFIED TYPE: ICD-10-CM

## 2024-08-21 DIAGNOSIS — R11.0 NAUSEA: ICD-10-CM

## 2024-08-21 DIAGNOSIS — R68.89 FLU-LIKE SYMPTOMS: ICD-10-CM

## 2024-08-21 DIAGNOSIS — K22.10 ULCER OF ESOPHAGUS WITHOUT BLEEDING: ICD-10-CM

## 2024-08-21 DIAGNOSIS — F41.9 ANXIETY: Primary | ICD-10-CM

## 2024-08-21 PROCEDURE — 1160F RVW MEDS BY RX/DR IN RCRD: CPT | Performed by: NURSE PRACTITIONER

## 2024-08-21 PROCEDURE — G2211 COMPLEX E/M VISIT ADD ON: HCPCS | Performed by: NURSE PRACTITIONER

## 2024-08-21 PROCEDURE — 99214 OFFICE O/P EST MOD 30 MIN: CPT | Performed by: NURSE PRACTITIONER

## 2024-08-21 PROCEDURE — 1036F TOBACCO NON-USER: CPT | Performed by: NURSE PRACTITIONER

## 2024-08-21 PROCEDURE — 1123F ACP DISCUSS/DSCN MKR DOCD: CPT | Performed by: NURSE PRACTITIONER

## 2024-08-21 PROCEDURE — 1159F MED LIST DOCD IN RCRD: CPT | Performed by: NURSE PRACTITIONER

## 2024-08-21 RX ORDER — ONDANSETRON 4 MG/1
4 TABLET, FILM COATED ORAL EVERY 8 HOURS PRN
Qty: 21 TABLET | Refills: 0 | Status: SHIPPED | OUTPATIENT
Start: 2024-08-21 | End: 2024-08-28

## 2024-08-21 RX ORDER — ALPRAZOLAM 0.5 MG/1
TABLET ORAL
Qty: 21 TABLET | Refills: 0 | Status: SHIPPED | OUTPATIENT
Start: 2024-08-21

## 2024-08-21 NOTE — TELEPHONE ENCOUNTER
Left message for patient returning her call. She has referral, however Dr. Dahl is booking into December.

## 2024-08-21 NOTE — PROGRESS NOTES
An interactive audio/visual telecommunication system which permits real time communications between the patient (at the originating site) and provider (at the distant site) was utilized to provide this telehealth service.    Verbal consent was requested and obtained from the patient for this telehealth visit.  We have confirmed the patient wishes to see meRadha, a board certified family nurse practitioner with an active [facility] license as well as verified the patient's identity and physical location in [place].      Problem List Items Addressed This Visit       Anxiety - Primary    Overview     S/p SSRI, Busoar and klonopin  8/21/24: anxiety provoked with unexplained medical symptoms; 7 day xanax RX         Relevant Medications    ALPRAZolam (Xanax) 0.5 mg tablet    Diarrhea    Current Assessment & Plan     cause not yet identified  she will schedule with GI  get CT abd/pelv w/wo     ? cymbalta causing colitis (calprotectin is elevated)   Gastrointestinal: Abdominal pain (children and adolescents: 13%; adults: 5%), decreased appetite (6% to 15%; dose related), nausea (18% to 25%; dose related), vomiting (children and adolescents: 9% to 15%; adults: 3% to 4%), xerostomia (adults: 11% to 14%, dose related; children and adolescents: 2%  1% to 10%:  Cardiovascular: Flushing (3%), increased blood pressure (2%), palpitations (2%)  Dermatologic: Diaphoresis (6%), pruritus (>=1%)  Gastrointestinal: Constipation (9% to 10%; dose related), diarrhea (6% to 9%), dysgeusia (>=1%), dyspepsia (2%), flatulence (>=1%), viral gastroenteritis (adolescents: 5%)  <1%:  Dermatologic: Contact dermatitis, ecchymoses, erythema of skin, night sweats, skin photosensitivity  Gastrointestinal: Bruxism, dysphagia, eructation, gastric ulcer, gastritis, gastroenteritis, gastrointestinal hemorrhage, halitosis, stomatitis         Esophageal ulcer    Overview     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          Lethargy     Overview     Happened once last year; recent 3 episodes happened within 6 weeks  Sx's start the same  - Day of or day before, becomes very lethargic   - Next day, diarrhea, nausea, intense night sweats, chills, shaking and feels very anxious/impending doom  Generally lasts 1-2 days         Current Assessment & Plan     Labs are pending from Megan and Dr Kang          Relevant Orders    CT abdomen pelvis w and wo IV contrast     Other Visit Diagnoses       Nausea        discussed dehydration risk  try zofran prn  has hx esophageal ulcer; rec start PPI qd also    Relevant Medications    ondansetron (Zofran) 4 mg tablet    Abnormal flushing and sweating        Relevant Orders    CT abdomen pelvis w and wo IV contrast    Unexplained night sweats        Relevant Orders    CT abdomen pelvis w and wo IV contrast    Flu-like symptoms        Relevant Orders    CT abdomen pelvis w and wo IV contrast             Subjective   Patient ID: Elena Bueno is a 69 y.o. female who presents for No chief complaint on file..  HPI  Anxiety  Unresolved medical issues   Anxiety peeks with episodes of diarrhea/nausea, etc  Then all she wants to do is sleep  Has tolerated xanax, klonopin    Diarrhea  Nausea  Sweats/chills  Last night sweating profusely, freezing  This is causing high anxiety   Plans to call Dr Dahl for GI referral  Has not had imaging    Unable to locate EGD results  Cologuard order     Component      Latest Ref Rng 2024   LEUKOCYTES (10*3/UL) IN BLOOD BY AUTOMATED COUNT, Sami      4.4 - 11.3 x10*3/uL 5.9    nRBC      0.0 - 0.0 /100 WBCs 0.0    ERYTHROCYTES (10*6/UL) IN BLOOD BY AUTOMATED COUNT, Sami      4.00 - 5.20 x10*6/uL 4.80    HEMOGLOBIN      12.0 - 16.0 g/dL 14.4    HEMATOCRIT      36.0 - 46.0 % 44.8    MCV      80 - 100 fL 93    MCH      26.0 - 34.0 pg 30.0    MCHC      32.0 - 36.0 g/dL 32.1    RED CELL DISTRIBUTION WIDTH      11.5 - 14.5 % 14.2    PLATELETS (10*3/UL) IN BLOOD AUTOMATED COUNT,  [place]izen [place]      150 - 450 x10*3/uL 205    NEUTROPHILS/100 LEUKOCYTES IN BLOOD BY AUTOMATED COUNT, Citizen [place]      40.0 - 80.0 % 43.9    Immature Granulocytes %, Automated      0.0 - 0.9 % 0.2    Lymphocytes %      13.0 - 44.0 % 47.5    Monocytes %      2.0 - 10.0 % 6.2    Eosinophils %      0.0 - 6.0 % 1.5    Basophils %      0.0 - 2.0 % 0.7    NEUTROPHILS (10*3/UL) IN BLOOD BY AUTOMATED COUNT, Citizen [place]      1.20 - 7.70 x10*3/uL 2.57    Immature Granulocytes Absolute, Automated      0.00 - 0.70 x10*3/uL 0.01    Lymphocytes Absolute      1.20 - 4.80 x10*3/uL 2.78    Monocytes Absolute      0.10 - 1.00 x10*3/uL 0.36    Eosinophils Absolute      0.00 - 0.70 x10*3/uL 0.09    Basophils Absolute      0.00 - 0.10 x10*3/uL 0.04    GLUCOSE      74 - 99 mg/dL 108 (H)    SODIUM      136 - 145 mmol/L 139    POTASSIUM      3.5 - 5.3 mmol/L 3.9    CHLORIDE      98 - 107 mmol/L 102    Bicarbonate      21 - 32 mmol/L 27    Anion Gap      10 - 20 mmol/L 14    Blood Urea Nitrogen      6 - 23 mg/dL 18    Creatinine      0.50 - 1.05 mg/dL 0.87    EGFR      >60 mL/min/1.73m*2 73    Calcium      8.6 - 10.3 mg/dL 9.9    Albumin      3.4 - 5.0 g/dL 4.7    Alkaline Phosphatase      33 - 136 U/L 90    Total Protein      6.4 - 8.2 g/dL 7.1    Total Protein      6.4 - 8.2 g/dL 7.2    AST      9 - 39 U/L 25    Bilirubin Total      0.0 - 1.2 mg/dL 0.5    ALT      7 - 45 U/L 20    Catecholamines, Urine Interpretation    Epinephrine, Ur      ug/L    Epinephrine, 24H Ur      1 - 14 ug/d    Epinephrine/Creatinine, Urine      0 - 20 ug/g CRT    Norepinephrine, Ur      ug/L    Norepinephrine/Creatinine, Urine      0 - 45 ug/g CRT    Norepinephrine, 24H Urine      14 - 120 ug/d    Dopamine, Ur      ug/L    Dopamine, 24H Urine      71 - 485 ug/d    Dopamine/Creatinine, Urine      0 - 250 ug/g CRT    Creatinine, Urine - per volume      mg/dL    Creatinine, Urine - per volume      mg/dL    Creatinine, 24 Hour Urine      500 - 1400 mg/d    Creatinine, 24 Hour  Urine      500 - 1400 mg/d    Total Volume      mL    Total Volume      mL    Collection period      hr    Collection period      hr    Color, Urine      Light-Yellow, Yellow, Dark-Yellow     Appearance, Urine      Clear     Specific Gravity, Urine      1.005 - 1.035     pH, Urine      5.0, 5.5, 6.0, 6.5, 7.0, 7.5, 8.0     Protein, Urine      NEGATIVE, 10 (TRACE), 20 (TRACE) mg/dL    Glucose, Urine      Normal mg/dL    Blood, Urine      NEGATIVE     Ketones, Urine      NEGATIVE mg/dL    Bilirubin, Urine      NEGATIVE     Urobilinogen, Urine      Normal mg/dL    Nitrite, Urine      NEGATIVE     Leukocyte Esterase, Urine      NEGATIVE     Metanephrines, Urine Interpretation    Metanephrines, Urine      ug/L    Metanephrines, 24H Urine      36 - 229 ug/d    Metanephrine, Urine - ratio to CRT      0 - 300 ug/g CRT    Normetanephrine, Urine      ug/L    Normetanephrine, 24H Urine      95 - 650 ug/d    Normetanephrine, Urine - ratio to CRT      0 - 400 ug/g CRT    Anti-SM      <1.0 AI <0.2    Anti-RNP      <1.0 AI <0.2    Anti-SM/RNP      <1.0 AI <0.2    Anti-SSA      <1.0 AI <0.2    Anti-SSB      <1.0 AI <0.2    Anti-SCL-70      <1.0 AI <0.2    Anti-LAVON-1 IgG      <1.0 AI <0.2    Anti-Chromatin      <1.0 AI <0.2    Anti-Centromere      <1.0 AI <0.2    ANTI-RIBOSOMAL P      <1.0 AI <0.2    Anti-DNA (DS)      <5.0 IU/mL <1.0    Albumin      3.4 - 5.0 g/dL 4.6    Alpha 1 Globulin      0.2 - 0.6 g/dL 0.3    Alpha 2 Globulin      0.4 - 1.1 g/dL 0.7    Beta Globulin      0.5 - 1.2 g/dL 0.8    Gamma      0.5 - 1.4 g/dL 0.8    Protein Electrophoresis Comment Normal.    Path Review - Serum Protein Electrophoresis  Reviewed and approved by KATH HERNANDEZ on 7/25/24 at 5:42 PM.    Albumin %      %    Alpha 1 Globulin %      %    Alpha 2 Globulin %      %    Beta Globulin %      %    Gamma Globulin %      %    Urine Electrophoresis Comment    Path Review-Urine Protein Electrophoresis     WBC, Urine      1-5, NONE /HPF    RBC, Urine       NONE, 1-2, 3-5 /HPF    Bacteria, Urine      NONE SEEN /HPF    Mucus, Urine      Reference range not established. /LPF    Normetanephrine      0.00 - 0.89 nmol/L 0.63    Metanephrine      0.00 - 0.49 nmol/L 0.15    Metanephrines Interpretation See Note    Thyroglobulin      1.3 - 31.8 ng/mL 1.2 (L)    Thyroglobulin LC-MS/MS      1.3 - 31.8 ng/mL Not Applicable    Anti-Thyroglobulin AB      0.0 - 4.0 IU/mL <0.9    AGNES      Negative  Positive !    AGNES Pattern Homogeneous    AGNES Titer 1:160    Aldosterone      ng/dL 10.1    Renin Activity      ng/mL/hr 1.8    Aldosterone/Renin Activity Calculation      <=25.0 ratio 5.6    Thyroid Stimulating Hormone      0.44 - 3.98 mIU/L 2.67    Thyroxine, Free      0.61 - 1.12 ng/dL 0.82    Triiodothyronine, Free      2.3 - 4.2 pg/mL 2.9    Parathyroid Hormone, Intact      18.5 - 88.0 pg/mL 77.7    Cortisol  A.M.      5.0 - 20.0 ug/dL 24.7 (H)    Adrenocorticotropic Hormone (ACTH)      7.2 - 63.3 pg/mL 12.2    DHEA Sulfate      13 - 130 ug/dL 20    C-Reactive Protein      <1.00 mg/dL 0.28    Sed Rate      0 - 30 mm/h 13    AMYLASE      29 - 103 U/L 71    LIPASE      9 - 82 U/L 23    Thyroid Peroxidase (TPO) Antibody      <=60 IU/mL 50    Tissue Transglutaminase, IgA      <15.0 U/mL <1.0    Tissue Transglutamase IgG      0.00 - 4.99 FLU <0.82    Deamidated Gliadin Antibody IgA      <15.0 U/mL <1.0    Deamidated Gliadin Antibody IgG      0.00 - 4.99 FLU <0.56    B. burgdorferi VlsE1/pepC10 Abs, NIKKIE      <=0.90 IV 0.81    Pancreatic Elastase, Fecal      >=100 ug/g    Calprotectin, Stool      <=49 ug/g    Total Protein, Urine      5 - 25 mg/dL    Ova + Parasite Exam      Negative     Giardia lamblia Antigen      Negative     Cryptosporidium Antigen by EIA      Negative     Extra Tube    Bill Only Thyroglobulin Billed    Dexamethasone      ng/dL    Gastrin      0 - 100 pg/mL    Calcitonin      0.0 - 5.1 pg/mL      Component      Latest Ref Rn 7/25/2024   LEUKOCYTES (10*3/UL) IN BLOOD  BY AUTOMATED COUNT, [place]      4.4 - 11.3 x10*3/uL    nRBC      0.0 - 0.0 /100 WBCs    ERYTHROCYTES (10*6/UL) IN BLOOD BY AUTOMATED COUNT, [place]      4.00 - 5.20 x10*6/uL    HEMOGLOBIN      12.0 - 16.0 g/dL    HEMATOCRIT      36.0 - 46.0 %    MCV      80 - 100 fL    MCH      26.0 - 34.0 pg    MCHC      32.0 - 36.0 g/dL    RED CELL DISTRIBUTION WIDTH      11.5 - 14.5 %    PLATELETS (10*3/UL) IN BLOOD AUTOMATED COUNT, [place]      150 - 450 x10*3/uL    NEUTROPHILS/100 LEUKOCYTES IN BLOOD BY AUTOMATED COUNT, [place]      40.0 - 80.0 %    Immature Granulocytes %, Automated      0.0 - 0.9 %    Lymphocytes %      13.0 - 44.0 %    Monocytes %      2.0 - 10.0 %    Eosinophils %      0.0 - 6.0 %    Basophils %      0.0 - 2.0 %    NEUTROPHILS (10*3/UL) IN BLOOD BY AUTOMATED COUNT, [place]      1.20 - 7.70 x10*3/uL    Immature Granulocytes Absolute, Automated      0.00 - 0.70 x10*3/uL    Lymphocytes Absolute      1.20 - 4.80 x10*3/uL    Monocytes Absolute      0.10 - 1.00 x10*3/uL    Eosinophils Absolute      0.00 - 0.70 x10*3/uL    Basophils Absolute      0.00 - 0.10 x10*3/uL    GLUCOSE      74 - 99 mg/dL    SODIUM      136 - 145 mmol/L    POTASSIUM      3.5 - 5.3 mmol/L    CHLORIDE      98 - 107 mmol/L    Bicarbonate      21 - 32 mmol/L    Anion Gap      10 - 20 mmol/L    Blood Urea Nitrogen      6 - 23 mg/dL    Creatinine      0.50 - 1.05 mg/dL    EGFR      >60 mL/min/1.73m*2    Calcium      8.6 - 10.3 mg/dL    Albumin      3.4 - 5.0 g/dL    Alkaline Phosphatase      33 - 136 U/L    Total Protein      6.4 - 8.2 g/dL    Total Protein      6.4 - 8.2 g/dL    AST      9 - 39 U/L    Bilirubin Total      0.0 - 1.2 mg/dL    ALT      7 - 45 U/L    Catecholamines, Urine Interpretation See Note    Epinephrine, Ur      ug/L 3    Epinephrine, 24H Ur      1 - 14 ug/d 6    Epinephrine/Creatinine, Urine      0 - 20 ug/g CRT 6    Norepinephrine, Ur      ug/L 30    Norepinephrine/Creatinine, Urine      0 - 45 ug/g CRT 61 (H)     Norepinephrine, 24H Urine      14 - 120 ug/d 60    Dopamine, Ur      ug/L 71    Dopamine, 24H Urine      71 - 485 ug/d 142    Dopamine/Creatinine, Urine      0 - 250 ug/g     Creatinine, Urine - per volume      mg/dL 49    Creatinine, Urine - per volume      mg/dL 49    Creatinine, 24 Hour Urine      500 - 1400 mg/d 978    Creatinine, 24 Hour Urine      500 - 1400 mg/d 978    Total Volume      mL 1996    Total Volume      mL 1996    Collection period      hr 24    Collection period      hr 24    Color, Urine      Light-Yellow, Yellow, Dark-Yellow  Light-Yellow    Appearance, Urine      Clear  Clear    Specific Gravity, Urine      1.005 - 1.035  1.016    pH, Urine      5.0, 5.5, 6.0, 6.5, 7.0, 7.5, 8.0  6.5    Protein, Urine      NEGATIVE, 10 (TRACE), 20 (TRACE) mg/dL NEGATIVE    Glucose, Urine      Normal mg/dL Normal    Blood, Urine      NEGATIVE  0.03 (TRACE) !    Ketones, Urine      NEGATIVE mg/dL NEGATIVE    Bilirubin, Urine      NEGATIVE  NEGATIVE    Urobilinogen, Urine      Normal mg/dL Normal    Nitrite, Urine      NEGATIVE  NEGATIVE    Leukocyte Esterase, Urine      NEGATIVE  25 Elise/µL !    Metanephrines, Urine Interpretation See Note    Metanephrines, Urine      ug/L 43    Metanephrines, 24H Urine      36 - 229 ug/d 86    Metanephrine, Urine - ratio to CRT      0 - 300 ug/g CRT 88    Normetanephrine, Urine      ug/L 149    Normetanephrine, 24H Urine      95 - 650 ug/d 297    Normetanephrine, Urine - ratio to CRT      0 - 400 ug/g     Anti-SM      <1.0 AI    Anti-RNP      <1.0 AI    Anti-SM/RNP      <1.0 AI    Anti-SSA      <1.0 AI    Anti-SSB      <1.0 AI    Anti-SCL-70      <1.0 AI    Anti-LAVON-1 IgG      <1.0 AI    Anti-Chromatin      <1.0 AI    Anti-Centromere      <1.0 AI    ANTI-RIBOSOMAL P      <1.0 AI    Anti-DNA (DS)      <5.0 IU/mL    Albumin      3.4 - 5.0 g/dL    Alpha 1 Globulin      0.2 - 0.6 g/dL    Alpha 2 Globulin      0.4 - 1.1 g/dL    Beta Globulin      0.5 - 1.2 g/dL     Gamma      0.5 - 1.4 g/dL    Protein Electrophoresis Comment    Path Review - Serum Protein Electrophoresis     Albumin %      % 36.1    Alpha 1 Globulin %      % 11.8    Alpha 2 Globulin %      % 13.3    Beta Globulin %      % 23.7    Gamma Globulin %      % 15.1    Urine Electrophoresis Comment Normal.    Path Review-Urine Protein Electrophoresis  Reviewed and approved by ABDULLAHI DAWN on 7/27/24 at 12:57 PM.    WBC, Urine      1-5, NONE /HPF 1-5    RBC, Urine      NONE, 1-2, 3-5 /HPF 3-5    Bacteria, Urine      NONE SEEN /HPF 1+ !    Mucus, Urine      Reference range not established. /LPF FEW    Normetanephrine      0.00 - 0.89 nmol/L    Metanephrine      0.00 - 0.49 nmol/L    Metanephrines Interpretation    Thyroglobulin      1.3 - 31.8 ng/mL    Thyroglobulin LC-MS/MS      1.3 - 31.8 ng/mL    Anti-Thyroglobulin AB      0.0 - 4.0 IU/mL    AGNES      Negative     AGNES Pattern    AGNES Titer    Aldosterone      ng/dL    Renin Activity      ng/mL/hr    Aldosterone/Renin Activity Calculation      <=25.0 ratio    Thyroid Stimulating Hormone      0.44 - 3.98 mIU/L    Thyroxine, Free      0.61 - 1.12 ng/dL    Triiodothyronine, Free      2.3 - 4.2 pg/mL    Parathyroid Hormone, Intact      18.5 - 88.0 pg/mL    Cortisol  A.M.      5.0 - 20.0 ug/dL    Adrenocorticotropic Hormone (ACTH)      7.2 - 63.3 pg/mL    DHEA Sulfate      13 - 130 ug/dL    C-Reactive Protein      <1.00 mg/dL    Sed Rate      0 - 30 mm/h    AMYLASE      29 - 103 U/L    LIPASE      9 - 82 U/L    Thyroid Peroxidase (TPO) Antibody      <=60 IU/mL    Tissue Transglutaminase, IgA      <15.0 U/mL    Tissue Transglutamase IgG      0.00 - 4.99 FLU    Deamidated Gliadin Antibody IgA      <15.0 U/mL    Deamidated Gliadin Antibody IgG      0.00 - 4.99 FLU    B. burgdorferi VlsE1/pepC10 Abs, NIKKIE      <=0.90 IV    Pancreatic Elastase, Fecal      >=100 ug/g 478    Calprotectin, Stool      <=49 ug/g 64 (H)    Total Protein, Urine      5 - 25 mg/dL 9    Ova +  Parasite Exam      Negative  Negative    Giardia lamblia Antigen      Negative  Negative    Cryptosporidium Antigen by EIA      Negative  Negative    Extra Tube Hold for add-ons.    Bill Only Thyroglobulin    Dexamethasone      ng/dL    Gastrin      0 - 100 pg/mL    Calcitonin      0.0 - 5.1 pg/mL      Component      Latest Ref Rng 7/29/2024   LEUKOCYTES (10*3/UL) IN BLOOD BY AUTOMATED COUNT, Chinese      4.4 - 11.3 x10*3/uL    nRBC      0.0 - 0.0 /100 WBCs    ERYTHROCYTES (10*6/UL) IN BLOOD BY AUTOMATED COUNT, Chinese      4.00 - 5.20 x10*6/uL    HEMOGLOBIN      12.0 - 16.0 g/dL    HEMATOCRIT      36.0 - 46.0 %    MCV      80 - 100 fL    MCH      26.0 - 34.0 pg    MCHC      32.0 - 36.0 g/dL    RED CELL DISTRIBUTION WIDTH      11.5 - 14.5 %    PLATELETS (10*3/UL) IN BLOOD AUTOMATED COUNT, Chinese      150 - 450 x10*3/uL    NEUTROPHILS/100 LEUKOCYTES IN BLOOD BY AUTOMATED COUNT, Chinese      40.0 - 80.0 %    Immature Granulocytes %, Automated      0.0 - 0.9 %    Lymphocytes %      13.0 - 44.0 %    Monocytes %      2.0 - 10.0 %    Eosinophils %      0.0 - 6.0 %    Basophils %      0.0 - 2.0 %    NEUTROPHILS (10*3/UL) IN BLOOD BY AUTOMATED COUNT, Chinese      1.20 - 7.70 x10*3/uL    Immature Granulocytes Absolute, Automated      0.00 - 0.70 x10*3/uL    Lymphocytes Absolute      1.20 - 4.80 x10*3/uL    Monocytes Absolute      0.10 - 1.00 x10*3/uL    Eosinophils Absolute      0.00 - 0.70 x10*3/uL    Basophils Absolute      0.00 - 0.10 x10*3/uL    GLUCOSE      74 - 99 mg/dL    SODIUM      136 - 145 mmol/L    POTASSIUM      3.5 - 5.3 mmol/L    CHLORIDE      98 - 107 mmol/L    Bicarbonate      21 - 32 mmol/L    Anion Gap      10 - 20 mmol/L    Blood Urea Nitrogen      6 - 23 mg/dL    Creatinine      0.50 - 1.05 mg/dL    EGFR      >60 mL/min/1.73m*2    Calcium      8.6 - 10.3 mg/dL    Albumin      3.4 - 5.0 g/dL    Alkaline Phosphatase      33 - 136 U/L    Total Protein      6.4 - 8.2 g/dL    Total Protein      6.4 -  8.2 g/dL    AST      9 - 39 U/L    Bilirubin Total      0.0 - 1.2 mg/dL    ALT      7 - 45 U/L    Catecholamines, Urine Interpretation    Epinephrine, Ur      ug/L    Epinephrine, 24H Ur      1 - 14 ug/d    Epinephrine/Creatinine, Urine      0 - 20 ug/g CRT    Norepinephrine, Ur      ug/L    Norepinephrine/Creatinine, Urine      0 - 45 ug/g CRT    Norepinephrine, 24H Urine      14 - 120 ug/d    Dopamine, Ur      ug/L    Dopamine, 24H Urine      71 - 485 ug/d    Dopamine/Creatinine, Urine      0 - 250 ug/g CRT    Creatinine, Urine - per volume      mg/dL    Creatinine, Urine - per volume      mg/dL    Creatinine, 24 Hour Urine      500 - 1400 mg/d    Creatinine, 24 Hour Urine      500 - 1400 mg/d    Total Volume      mL    Total Volume      mL    Collection period      hr    Collection period      hr    Color, Urine      Light-Yellow, Yellow, Dark-Yellow     Appearance, Urine      Clear     Specific Gravity, Urine      1.005 - 1.035     pH, Urine      5.0, 5.5, 6.0, 6.5, 7.0, 7.5, 8.0     Protein, Urine      NEGATIVE, 10 (TRACE), 20 (TRACE) mg/dL    Glucose, Urine      Normal mg/dL    Blood, Urine      NEGATIVE     Ketones, Urine      NEGATIVE mg/dL    Bilirubin, Urine      NEGATIVE     Urobilinogen, Urine      Normal mg/dL    Nitrite, Urine      NEGATIVE     Leukocyte Esterase, Urine      NEGATIVE     Metanephrines, Urine Interpretation    Metanephrines, Urine      ug/L    Metanephrines, 24H Urine      36 - 229 ug/d    Metanephrine, Urine - ratio to CRT      0 - 300 ug/g CRT    Normetanephrine, Urine      ug/L    Normetanephrine, 24H Urine      95 - 650 ug/d    Normetanephrine, Urine - ratio to CRT      0 - 400 ug/g CRT    Anti-SM      <1.0 AI    Anti-RNP      <1.0 AI    Anti-SM/RNP      <1.0 AI    Anti-SSA      <1.0 AI    Anti-SSB      <1.0 AI    Anti-SCL-70      <1.0 AI    Anti-LAVON-1 IgG      <1.0 AI    Anti-Chromatin      <1.0 AI    Anti-Centromere      <1.0 AI    ANTI-RIBOSOMAL P      <1.0 AI    Anti-DNA (DS)       <5.0 IU/mL    Albumin      3.4 - 5.0 g/dL    Alpha 1 Globulin      0.2 - 0.6 g/dL    Alpha 2 Globulin      0.4 - 1.1 g/dL    Beta Globulin      0.5 - 1.2 g/dL    Gamma      0.5 - 1.4 g/dL    Protein Electrophoresis Comment    Path Review - Serum Protein Electrophoresis     Albumin %      %    Alpha 1 Globulin %      %    Alpha 2 Globulin %      %    Beta Globulin %      %    Gamma Globulin %      %    Urine Electrophoresis Comment    Path Review-Urine Protein Electrophoresis     WBC, Urine      1-5, NONE /HPF    RBC, Urine      NONE, 1-2, 3-5 /HPF    Bacteria, Urine      NONE SEEN /HPF    Mucus, Urine      Reference range not established. /LPF    Normetanephrine      0.00 - 0.89 nmol/L    Metanephrine      0.00 - 0.49 nmol/L    Metanephrines Interpretation    Thyroglobulin      1.3 - 31.8 ng/mL    Thyroglobulin LC-MS/MS      1.3 - 31.8 ng/mL    Anti-Thyroglobulin AB      0.0 - 4.0 IU/mL    AGNES      Negative     AGNES Pattern    AGNES Titer    Aldosterone      ng/dL    Renin Activity      ng/mL/hr    Aldosterone/Renin Activity Calculation      <=25.0 ratio    Thyroid Stimulating Hormone      0.44 - 3.98 mIU/L    Thyroxine, Free      0.61 - 1.12 ng/dL    Triiodothyronine, Free      2.3 - 4.2 pg/mL    Parathyroid Hormone, Intact      18.5 - 88.0 pg/mL    Cortisol  A.M.      5.0 - 20.0 ug/dL 1.6 (L)    Adrenocorticotropic Hormone (ACTH)      7.2 - 63.3 pg/mL 1.6 (L)    DHEA Sulfate      13 - 130 ug/dL 10 (L)    C-Reactive Protein      <1.00 mg/dL    Sed Rate      0 - 30 mm/h    AMYLASE      29 - 103 U/L    LIPASE      9 - 82 U/L    Thyroid Peroxidase (TPO) Antibody      <=60 IU/mL    Tissue Transglutaminase, IgA      <15.0 U/mL    Tissue Transglutamase IgG      0.00 - 4.99 FLU    Deamidated Gliadin Antibody IgA      <15.0 U/mL    Deamidated Gliadin Antibody IgG      0.00 - 4.99 FLU    B. burgdorferi VlsE1/pepC10 Abs, NIKKIE      <=0.90 IV    Pancreatic Elastase, Fecal      >=100 ug/g    Calprotectin, Stool      <=49 ug/g     Total Protein, Urine      5 - 25 mg/dL    Ova + Parasite Exam      Negative     Giardia lamblia Antigen      Negative     Cryptosporidium Antigen by EIA      Negative     Extra Tube    Bill Only Thyroglobulin    Dexamethasone      ng/dL 247.2    Gastrin      0 - 100 pg/mL    Calcitonin      0.0 - 5.1 pg/mL      Component      Latest Ref Rn 8/15/2024   LEUKOCYTES (10*3/UL) IN BLOOD BY AUTOMATED COUNT, [place]ian      4.4 - 11.3 x10*3/uL    nRBC      0.0 - 0.0 /100 WBCs    ERYTHROCYTES (10*6/UL) IN BLOOD BY AUTOMATED COUNT, [place]ian      4.00 - 5.20 x10*6/uL    HEMOGLOBIN      12.0 - 16.0 g/dL    HEMATOCRIT      36.0 - 46.0 %    MCV      80 - 100 fL    MCH      26.0 - 34.0 pg    MCHC      32.0 - 36.0 g/dL    RED CELL DISTRIBUTION WIDTH      11.5 - 14.5 %    PLATELETS (10*3/UL) IN BLOOD AUTOMATED COUNT, [place]ian      150 - 450 x10*3/uL    NEUTROPHILS/100 LEUKOCYTES IN BLOOD BY AUTOMATED COUNT, [place]ian      40.0 - 80.0 %    Immature Granulocytes %, Automated      0.0 - 0.9 %    Lymphocytes %      13.0 - 44.0 %    Monocytes %      2.0 - 10.0 %    Eosinophils %      0.0 - 6.0 %    Basophils %      0.0 - 2.0 %    NEUTROPHILS (10*3/UL) IN BLOOD BY AUTOMATED COUNT, [place]      1.20 - 7.70 x10*3/uL    Immature Granulocytes Absolute, Automated      0.00 - 0.70 x10*3/uL    Lymphocytes Absolute      1.20 - 4.80 x10*3/uL    Monocytes Absolute      0.10 - 1.00 x10*3/uL    Eosinophils Absolute      0.00 - 0.70 x10*3/uL    Basophils Absolute      0.00 - 0.10 x10*3/uL    GLUCOSE      74 - 99 mg/dL    SODIUM      136 - 145 mmol/L    POTASSIUM      3.5 - 5.3 mmol/L    CHLORIDE      98 - 107 mmol/L    Bicarbonate      21 - 32 mmol/L    Anion Gap      10 - 20 mmol/L    Blood Urea Nitrogen      6 - 23 mg/dL    Creatinine      0.50 - 1.05 mg/dL    EGFR      >60 mL/min/1.73m*2    Calcium      8.6 - 10.3 mg/dL    Albumin      3.4 - 5.0 g/dL    Alkaline Phosphatase      33 - 136 U/L    Total Protein      6.4 - 8.2 g/dL    Total Protein       6.4 - 8.2 g/dL    AST      9 - 39 U/L    Bilirubin Total      0.0 - 1.2 mg/dL    ALT      7 - 45 U/L    Catecholamines, Urine Interpretation    Epinephrine, Ur      ug/L    Epinephrine, 24H Ur      1 - 14 ug/d    Epinephrine/Creatinine, Urine      0 - 20 ug/g CRT    Norepinephrine, Ur      ug/L    Norepinephrine/Creatinine, Urine      0 - 45 ug/g CRT    Norepinephrine, 24H Urine      14 - 120 ug/d    Dopamine, Ur      ug/L    Dopamine, 24H Urine      71 - 485 ug/d    Dopamine/Creatinine, Urine      0 - 250 ug/g CRT    Creatinine, Urine - per volume      mg/dL    Creatinine, Urine - per volume      mg/dL    Creatinine, 24 Hour Urine      500 - 1400 mg/d    Creatinine, 24 Hour Urine      500 - 1400 mg/d    Total Volume      mL    Total Volume      mL    Collection period      hr    Collection period      hr    Color, Urine      Light-Yellow, Yellow, Dark-Yellow     Appearance, Urine      Clear     Specific Gravity, Urine      1.005 - 1.035     pH, Urine      5.0, 5.5, 6.0, 6.5, 7.0, 7.5, 8.0     Protein, Urine      NEGATIVE, 10 (TRACE), 20 (TRACE) mg/dL    Glucose, Urine      Normal mg/dL    Blood, Urine      NEGATIVE     Ketones, Urine      NEGATIVE mg/dL    Bilirubin, Urine      NEGATIVE     Urobilinogen, Urine      Normal mg/dL    Nitrite, Urine      NEGATIVE     Leukocyte Esterase, Urine      NEGATIVE     Metanephrines, Urine Interpretation    Metanephrines, Urine      ug/L    Metanephrines, 24H Urine      36 - 229 ug/d    Metanephrine, Urine - ratio to CRT      0 - 300 ug/g CRT    Normetanephrine, Urine      ug/L    Normetanephrine, 24H Urine      95 - 650 ug/d    Normetanephrine, Urine - ratio to CRT      0 - 400 ug/g CRT    Anti-SM      <1.0 AI    Anti-RNP      <1.0 AI    Anti-SM/RNP      <1.0 AI    Anti-SSA      <1.0 AI    Anti-SSB      <1.0 AI    Anti-SCL-70      <1.0 AI    Anti-LAVON-1 IgG      <1.0 AI    Anti-Chromatin      <1.0 AI    Anti-Centromere      <1.0 AI    ANTI-RIBOSOMAL P      <1.0 AI    Anti-DNA  (DS)      <5.0 IU/mL    Albumin      3.4 - 5.0 g/dL    Alpha 1 Globulin      0.2 - 0.6 g/dL    Alpha 2 Globulin      0.4 - 1.1 g/dL    Beta Globulin      0.5 - 1.2 g/dL    Gamma      0.5 - 1.4 g/dL    Protein Electrophoresis Comment    Path Review - Serum Protein Electrophoresis     Albumin %      %    Alpha 1 Globulin %      %    Alpha 2 Globulin %      %    Beta Globulin %      %    Gamma Globulin %      %    Urine Electrophoresis Comment    Path Review-Urine Protein Electrophoresis     WBC, Urine      1-5, NONE /HPF    RBC, Urine      NONE, 1-2, 3-5 /HPF    Bacteria, Urine      NONE SEEN /HPF    Mucus, Urine      Reference range not established. /LPF    Normetanephrine      0.00 - 0.89 nmol/L    Metanephrine      0.00 - 0.49 nmol/L    Metanephrines Interpretation    Thyroglobulin      1.3 - 31.8 ng/mL    Thyroglobulin LC-MS/MS      1.3 - 31.8 ng/mL    Anti-Thyroglobulin AB      0.0 - 4.0 IU/mL    AGNES      Negative     AGNES Pattern    AGNES Titer    Aldosterone      ng/dL    Renin Activity      ng/mL/hr    Aldosterone/Renin Activity Calculation      <=25.0 ratio    Thyroid Stimulating Hormone      0.44 - 3.98 mIU/L    Thyroxine, Free      0.61 - 1.12 ng/dL    Triiodothyronine, Free      2.3 - 4.2 pg/mL    Parathyroid Hormone, Intact      18.5 - 88.0 pg/mL    Cortisol  A.M.      5.0 - 20.0 ug/dL    Adrenocorticotropic Hormone (ACTH)      7.2 - 63.3 pg/mL    DHEA Sulfate      13 - 130 ug/dL    C-Reactive Protein      <1.00 mg/dL    Sed Rate      0 - 30 mm/h    AMYLASE      29 - 103 U/L    LIPASE      9 - 82 U/L    Thyroid Peroxidase (TPO) Antibody      <=60 IU/mL    Tissue Transglutaminase, IgA      <15.0 U/mL    Tissue Transglutamase IgG      0.00 - 4.99 FLU    Deamidated Gliadin Antibody IgA      <15.0 U/mL    Deamidated Gliadin Antibody IgG      0.00 - 4.99 FLU    B. burgdorferi VlsE1/pepC10 Abs, NIKKIE      <=0.90 IV    Pancreatic Elastase, Fecal      >=100 ug/g    Calprotectin, Stool      <=49 ug/g    Total  "Protein, Urine      5 - 25 mg/dL    Ova + Parasite Exam      Negative     Giardia lamblia Antigen      Negative     Cryptosporidium Antigen by EIA      Negative     Extra Tube    Bill Only Thyroglobulin    Dexamethasone      ng/dL    Gastrin      0 - 100 pg/mL 39    Calcitonin      0.0 - 5.1 pg/mL <2.0         Review of Systems   All other systems reviewed and are negative.      BP Readings from Last 3 Encounters:   07/24/24 120/88   04/18/24 109/74   10/31/23 124/83      Wt Readings from Last 3 Encounters:   07/24/24 55.4 kg (122 lb 3.2 oz)   05/07/24 56.7 kg (125 lb)   04/18/24 58.5 kg (129 lb)      BMI:   Estimated body mass index is 22.35 kg/m² as calculated from the following:    Height as of 5/7/24: 1.575 m (5' 2\").    Weight as of 7/24/24: 55.4 kg (122 lb 3.2 oz).    Objective   Physical Exam  Gen: Alert, NAD  Respiratory:  resp effort NL, speaking in complete sentences   Neuro:  coordination intact   Mood: normal    Sitting upright    "

## 2024-08-21 NOTE — TELEPHONE ENCOUNTER
Pt called states she has been having high anxiety and nausea asking if she can get a script for anxiety  Please schedule

## 2024-08-21 NOTE — ASSESSMENT & PLAN NOTE
cause not yet identified  she will schedule with GI  get CT abd/pelv w/wo     ? cymbalta causing colitis (calprotectin is elevated)   Gastrointestinal: Abdominal pain (children and adolescents: 13%; adults: 5%), decreased appetite (6% to 15%; dose related), nausea (18% to 25%; dose related), vomiting (children and adolescents: 9% to 15%; adults: 3% to 4%), xerostomia (adults: 11% to 14%, dose related; children and adolescents: 2%  1% to 10%:  Cardiovascular: Flushing (3%), increased blood pressure (2%), palpitations (2%)  Dermatologic: Diaphoresis (6%), pruritus (>=1%)  Gastrointestinal: Constipation (9% to 10%; dose related), diarrhea (6% to 9%), dysgeusia (>=1%), dyspepsia (2%), flatulence (>=1%), viral gastroenteritis (adolescents: 5%)  <1%:  Dermatologic: Contact dermatitis, ecchymoses, erythema of skin, night sweats, skin photosensitivity  Gastrointestinal: Bruxism, dysphagia, eructation, gastric ulcer, gastritis, gastroenteritis, gastrointestinal hemorrhage, halitosis, stomatitis

## 2024-08-23 DIAGNOSIS — R61 NIGHT SWEAT: ICD-10-CM

## 2024-08-23 DIAGNOSIS — R19.7 DIARRHEA, UNSPECIFIED TYPE: ICD-10-CM

## 2024-08-23 DIAGNOSIS — R53.83 LETHARGY: ICD-10-CM

## 2024-08-23 LAB
5OH-INDOLEACETATE 24H UR-MCNC: 2.6 MG/L
5OH-INDOLEACETATE 24H UR-MRATE: 3.8 MG/24 HR (ref 0–14.9)
COPRO1 24H UR-MCNC: 8 UG/L
COPRO1 24H UR-MRATE: 12 UG/24 HR (ref 0–24)
COPRO3 24H UR-MCNC: 42 UG/L
COPRO3 24H UR-MRATE: 62 UG/24 HR (ref 0–74)
HEPTA-CP 24H UR-MRATE: 4 UG/24 HR (ref 0–4)
HEPTA-CP UR-MCNC: 3 UG/L
HEXA-CP 24H UR-MRATE: <1 UG/24 HR (ref 0–1)
HEXA-CP UR-MCNC: <1 UG/L
PENTA-CP 24H UR-MRATE: <1 UG/24 HR (ref 0–4)
PENTA-CP UR-MCNC: <1 UG/L
UROPOR 24H UR-MRATE: 41 UG/24 HR (ref 0–24)
UROPOR UR-MCNC: 28 UG/L

## 2024-08-24 ENCOUNTER — APPOINTMENT (OUTPATIENT)
Dept: RADIOLOGY | Facility: HOSPITAL | Age: 69
End: 2024-08-24
Payer: MEDICARE

## 2024-08-24 ENCOUNTER — LAB REQUISITION (OUTPATIENT)
Dept: LAB | Facility: HOSPITAL | Age: 69
End: 2024-08-24
Payer: MEDICARE

## 2024-08-24 ENCOUNTER — APPOINTMENT (OUTPATIENT)
Dept: CARDIOLOGY | Facility: HOSPITAL | Age: 69
End: 2024-08-24
Payer: MEDICARE

## 2024-08-24 ENCOUNTER — HOSPITAL ENCOUNTER (EMERGENCY)
Facility: HOSPITAL | Age: 69
Discharge: HOME | End: 2024-08-24
Attending: EMERGENCY MEDICINE
Payer: MEDICARE

## 2024-08-24 VITALS
HEART RATE: 86 BPM | DIASTOLIC BLOOD PRESSURE: 74 MMHG | BODY MASS INDEX: 22.45 KG/M2 | SYSTOLIC BLOOD PRESSURE: 109 MMHG | RESPIRATION RATE: 14 BRPM | WEIGHT: 122 LBS | TEMPERATURE: 99.1 F | OXYGEN SATURATION: 97 % | HEIGHT: 62 IN

## 2024-08-24 DIAGNOSIS — M19.90 ARTHRITIS: ICD-10-CM

## 2024-08-24 DIAGNOSIS — K52.9 ENTEROCOLITIS: Primary | ICD-10-CM

## 2024-08-24 DIAGNOSIS — R19.7 DIARRHEA, UNSPECIFIED: ICD-10-CM

## 2024-08-24 DIAGNOSIS — R53.83 OTHER FATIGUE: ICD-10-CM

## 2024-08-24 DIAGNOSIS — R19.7 DIARRHEA, UNSPECIFIED TYPE: ICD-10-CM

## 2024-08-24 DIAGNOSIS — R61 GENERALIZED HYPERHIDROSIS: ICD-10-CM

## 2024-08-24 DIAGNOSIS — F41.9 ANXIETY: ICD-10-CM

## 2024-08-24 DIAGNOSIS — E03.9 HYPOTHYROIDISM, ADULT: ICD-10-CM

## 2024-08-24 LAB
ALBUMIN SERPL BCP-MCNC: 4.5 G/DL (ref 3.4–5)
ALP SERPL-CCNC: 73 U/L (ref 33–136)
ALT SERPL W P-5'-P-CCNC: 15 U/L (ref 7–45)
ANION GAP SERPL CALC-SCNC: 15 MMOL/L (ref 10–20)
AST SERPL W P-5'-P-CCNC: 18 U/L (ref 9–39)
BASOPHILS # BLD AUTO: 0.03 X10*3/UL (ref 0–0.1)
BASOPHILS NFR BLD AUTO: 0.4 %
BILIRUB SERPL-MCNC: 0.6 MG/DL (ref 0–1.2)
BUN SERPL-MCNC: 18 MG/DL (ref 6–23)
CALCIUM SERPL-MCNC: 10.3 MG/DL (ref 8.6–10.3)
CARDIAC TROPONIN I PNL SERPL HS: 5 NG/L (ref 0–13)
CARDIAC TROPONIN I PNL SERPL HS: 9 NG/L (ref 0–13)
CHLORIDE SERPL-SCNC: 100 MMOL/L (ref 98–107)
CO2 SERPL-SCNC: 24 MMOL/L (ref 21–32)
CREAT SERPL-MCNC: 0.86 MG/DL (ref 0.5–1.05)
EGFRCR SERPLBLD CKD-EPI 2021: 74 ML/MIN/1.73M*2
EOSINOPHIL # BLD AUTO: 0.03 X10*3/UL (ref 0–0.7)
EOSINOPHIL NFR BLD AUTO: 0.4 %
ERYTHROCYTE [DISTWIDTH] IN BLOOD BY AUTOMATED COUNT: 13.2 % (ref 11.5–14.5)
GLUCOSE SERPL-MCNC: 85 MG/DL (ref 74–99)
HCT VFR BLD AUTO: 42.4 % (ref 36–46)
HGB BLD-MCNC: 14.2 G/DL (ref 12–16)
IMM GRANULOCYTES # BLD AUTO: 0.02 X10*3/UL (ref 0–0.7)
IMM GRANULOCYTES NFR BLD AUTO: 0.3 % (ref 0–0.9)
LYMPHOCYTES # BLD AUTO: 2.06 X10*3/UL (ref 1.2–4.8)
LYMPHOCYTES NFR BLD AUTO: 28.4 %
MAGNESIUM SERPL-MCNC: 1.99 MG/DL (ref 1.6–2.4)
MCH RBC QN AUTO: 30 PG (ref 26–34)
MCHC RBC AUTO-ENTMCNC: 33.5 G/DL (ref 32–36)
MCV RBC AUTO: 90 FL (ref 80–100)
MONOCYTES # BLD AUTO: 0.51 X10*3/UL (ref 0.1–1)
MONOCYTES NFR BLD AUTO: 7 %
NEUTROPHILS # BLD AUTO: 4.6 X10*3/UL (ref 1.2–7.7)
NEUTROPHILS NFR BLD AUTO: 63.5 %
NRBC BLD-RTO: 0 /100 WBCS (ref 0–0)
PLATELET # BLD AUTO: 208 X10*3/UL (ref 150–450)
POTASSIUM SERPL-SCNC: 3.8 MMOL/L (ref 3.5–5.3)
PROT SERPL-MCNC: 7.1 G/DL (ref 6.4–8.2)
RBC # BLD AUTO: 4.73 X10*6/UL (ref 4–5.2)
SODIUM SERPL-SCNC: 135 MMOL/L (ref 136–145)
WBC # BLD AUTO: 7.3 X10*3/UL (ref 4.4–11.3)

## 2024-08-24 PROCEDURE — 2550000001 HC RX 255 CONTRASTS: Performed by: EMERGENCY MEDICINE

## 2024-08-24 PROCEDURE — 83520 IMMUNOASSAY QUANT NOS NONAB: CPT

## 2024-08-24 PROCEDURE — 82135 ASSAY AMINOLEVULINIC ACID: CPT | Performed by: INTERNAL MEDICINE

## 2024-08-24 PROCEDURE — 99285 EMERGENCY DEPT VISIT HI MDM: CPT

## 2024-08-24 PROCEDURE — 85025 COMPLETE CBC W/AUTO DIFF WBC: CPT

## 2024-08-24 PROCEDURE — 96374 THER/PROPH/DIAG INJ IV PUSH: CPT

## 2024-08-24 PROCEDURE — 2500000004 HC RX 250 GENERAL PHARMACY W/ HCPCS (ALT 636 FOR OP/ED)

## 2024-08-24 PROCEDURE — 93010 ELECTROCARDIOGRAM REPORT: CPT | Performed by: EMERGENCY MEDICINE

## 2024-08-24 PROCEDURE — 36415 COLL VENOUS BLD VENIPUNCTURE: CPT

## 2024-08-24 PROCEDURE — 74177 CT ABD & PELVIS W/CONTRAST: CPT

## 2024-08-24 PROCEDURE — 99285 EMERGENCY DEPT VISIT HI MDM: CPT | Performed by: EMERGENCY MEDICINE

## 2024-08-24 PROCEDURE — 96375 TX/PRO/DX INJ NEW DRUG ADDON: CPT

## 2024-08-24 PROCEDURE — 80053 COMPREHEN METABOLIC PANEL: CPT

## 2024-08-24 PROCEDURE — 93005 ELECTROCARDIOGRAM TRACING: CPT

## 2024-08-24 PROCEDURE — 84484 ASSAY OF TROPONIN QUANT: CPT

## 2024-08-24 PROCEDURE — 74177 CT ABD & PELVIS W/CONTRAST: CPT | Mod: FOREIGN READ | Performed by: RADIOLOGY

## 2024-08-24 PROCEDURE — 83735 ASSAY OF MAGNESIUM: CPT

## 2024-08-24 RX ORDER — HYDROXYZINE HYDROCHLORIDE 25 MG/1
25 TABLET, FILM COATED ORAL EVERY 6 HOURS PRN
Qty: 120 TABLET | Refills: 0 | Status: SHIPPED | OUTPATIENT
Start: 2024-08-24 | End: 2024-09-23

## 2024-08-24 RX ORDER — ONDANSETRON HYDROCHLORIDE 2 MG/ML
4 INJECTION, SOLUTION INTRAVENOUS ONCE
Status: COMPLETED | OUTPATIENT
Start: 2024-08-24 | End: 2024-08-24

## 2024-08-24 RX ORDER — MELOXICAM 15 MG/1
15 TABLET ORAL DAILY
Qty: 90 TABLET | Refills: 3 | Status: SHIPPED | OUTPATIENT
Start: 2024-08-24

## 2024-08-24 RX ORDER — AMOXICILLIN AND CLAVULANATE POTASSIUM 875; 125 MG/1; MG/1
1 TABLET, FILM COATED ORAL EVERY 12 HOURS
Qty: 20 TABLET | Refills: 0 | Status: SHIPPED | OUTPATIENT
Start: 2024-08-24 | End: 2024-09-03

## 2024-08-24 RX ORDER — LORAZEPAM 2 MG/ML
0.5 INJECTION INTRAMUSCULAR ONCE
Status: COMPLETED | OUTPATIENT
Start: 2024-08-24 | End: 2024-08-24

## 2024-08-24 RX ADMIN — IOHEXOL 75 ML: 350 INJECTION, SOLUTION INTRAVENOUS at 15:22

## 2024-08-24 RX ADMIN — LORAZEPAM 0.5 MG: 2 INJECTION INTRAMUSCULAR; INTRAVENOUS at 14:13

## 2024-08-24 RX ADMIN — ONDANSETRON 4 MG: 2 INJECTION INTRAMUSCULAR; INTRAVENOUS at 14:13

## 2024-08-24 ASSESSMENT — COLUMBIA-SUICIDE SEVERITY RATING SCALE - C-SSRS
6. HAVE YOU EVER DONE ANYTHING, STARTED TO DO ANYTHING, OR PREPARED TO DO ANYTHING TO END YOUR LIFE?: NO
1. IN THE PAST MONTH, HAVE YOU WISHED YOU WERE DEAD OR WISHED YOU COULD GO TO SLEEP AND NOT WAKE UP?: NO
2. HAVE YOU ACTUALLY HAD ANY THOUGHTS OF KILLING YOURSELF?: NO

## 2024-08-24 ASSESSMENT — LIFESTYLE VARIABLES
TOTAL SCORE: 0
EVER HAD A DRINK FIRST THING IN THE MORNING TO STEADY YOUR NERVES TO GET RID OF A HANGOVER: NO
EVER FELT BAD OR GUILTY ABOUT YOUR DRINKING: NO
HAVE YOU EVER FELT YOU SHOULD CUT DOWN ON YOUR DRINKING: NO
HAVE PEOPLE ANNOYED YOU BY CRITICIZING YOUR DRINKING: NO

## 2024-08-24 ASSESSMENT — PAIN SCALES - GENERAL: PAINLEVEL_OUTOF10: 0 - NO PAIN

## 2024-08-24 ASSESSMENT — PAIN - FUNCTIONAL ASSESSMENT: PAIN_FUNCTIONAL_ASSESSMENT: 0-10

## 2024-08-24 NOTE — ED PROVIDER NOTES
"History of Present Illness     History provided by: Patient  Limitations to History: None  External Records Reviewed with Brief Summary: Outpatient progress note from primary care physician which showed workup of acute intermittent blood phoria and Outpatient Labs from yesterday 8/23/2024 which showed provider ordered aminolevulinic acid acid, ALA dehydratase, PBG deaminase, ganglioside for further workup of this condition    HPI:  Elena Bueno is a 69 y.o. female past medical history significant for anxiety and hypothyroidism presenting for 4 days of diarrhea.  She states that she is currently being worked up by her primary care physician for this, and they ordered lab work to be done during a episode.  She states that she has been having \"episodes\" for the past 6 months, but this past episode has lasted longer.  She states that she has been able to eat/drink very well in the past 4 days, has been able to keep down p.o. since tubes.  She says that she feels like the idea of the eating is very unappetizing and gives her nausea just thinking of food.  She has been referred to a gastroenterologist, but has not been able to follow-up with them since she is not able to get an appointment till the end of December.    Physical Exam   Triage vitals:  T 37.3 °C (99.1 °F)  HR 82  /82  RR 16  O2 99 % None (Room air)    Physical Exam  Vitals reviewed.   Constitutional:       General: She is not in acute distress.     Appearance: Normal appearance. She is not ill-appearing.   HENT:      Head: Normocephalic and atraumatic.      Right Ear: External ear normal.      Left Ear: External ear normal.      Nose: Nose normal.      Mouth/Throat:      Mouth: Mucous membranes are moist.   Eyes:      Conjunctiva/sclera: Conjunctivae normal.   Cardiovascular:      Rate and Rhythm: Normal rate and regular rhythm.   Pulmonary:      Effort: Pulmonary effort is normal. No respiratory distress.      Breath sounds: Normal breath sounds. "   Abdominal:      General: Abdomen is flat. Bowel sounds are normal. There is no distension.      Palpations: Abdomen is soft.      Tenderness: There is no abdominal tenderness. There is no guarding or rebound.   Musculoskeletal:      Cervical back: Normal range of motion.   Skin:     General: Skin is warm and dry.   Neurological:      Mental Status: She is alert and oriented to person, place, and time.      Gait: Gait normal.   Psychiatric:         Mood and Affect: Mood normal.         Behavior: Behavior normal.        Medical Decision Making & ED Course   Medical Decision Makin y.o. female history of anxiety, hypothyroid, presenting for new episode of diarrhea.  Diarrhea is associated with anxiety/panic attack of diaphoresis/shortness of breath/palpitations; shortly after this anxiety there is diarrhea associated with it; episodes in general having about once a month, this episode is associated with nausea and decreased appetite.  On physical exam there is no abdominal tenderness, guarding, distention, and is soft to palpation.  Given chronicity of diarrhea, not likely acute or life-threatening, but given decreased oral intake will order CMP/CBC to assess electrolyte status.  Could also be cardiac etiology driving symptoms so will do cardiac workup.  Given that she was going to get a CT from her PCP, will perform CT here to rule out any structural causes of her diarrhea.  CMP, CBC, magnesium all within normal range and do not show any severe electrolyte drainage from diarrhea.  CT scan showed mild enterocolitis, mild hepatic steatosis, and colonic diverticulosis without diverticulitis.  Enterocolitis causing the diarrhea is being worked up by her primary care provider outpatient.  Given that acute life-threatening causes and/or consequences of her diarrhea have been ruled out, she is stable to follow-up with her primary care provider and GI outpatient for further workup of this diarrhea.  In terms of her  mood, I encouraged that she follow-up with a provider to start her on a daily anxiety medication, and send a 30-day supply of Atarax to her pharmacy.  P.o. challenge done in ER to ensure she is able to tolerate p.o., and she was able to keep down ginger ale and a small pack of crackers.  Her primary care doctor reached out to us about labs that she ordered to workup her condition to see if blood could be drawn from this encounter  ----    Differential diagnoses considered include but are not limited to: Infectious diarrhea, IBS induced diarrhea, anxiety, atypical chest pain, food allergy     Social Determinants of Health which Significantly Impact Care: None identified The following actions were taken to address these social determinants: List to follow-up providers given on AVS    EKG Independent Interpretation: EKG interpreted by myself. Please see ED Course for full interpretation.    Independent Result Review and Interpretation: Relevant laboratory and radiographic results were reviewed and independently interpreted by myself.  As necessary, they are commented on in the ED Course.    Chronic conditions affecting the patient's care: As documented above in [facility]    The patient was discussed with the following consultants/services: None    Care Considerations: As documented above in [facility]    ED Course:  Diagnoses as of 08/24/24 1926   Enterocolitis   Diarrhea, unspecified type   Anxiety   Hypothyroidism, adult     Disposition   Discharge    Procedures   Procedures    Patient seen and discussed with ED attending physician.    I reviewed the case with the attending ED physician. The attending ED physician agrees with the plan. Patient and/or patient´s representative was counseled regarding labs, imaging, likely diagnosis, and plan. All questions were answered. This assessment and plan has been discussed with the attending physician, Dr. Lyman.    Verna Ann, DO  Family Medicine, PGY-2    This note may have been  transcribed using Dragon voice recognition system and there is a possibility of unintentional typing misprints.  Any information found to be copied from previous providers is done in the best interest of the patient to provide accurate, quality, and continuity of care.     =================Attending note===============    The patient was seen by the resident/fellow.  I have personally performed a substantive portion of the encounter.  I have seen and examined the patient; agree with the workup, evaluation, MDM,   management and diagnosis.  The care plan has been discussed with the resident; I have reviewed the resident’s note and agree with the documented findings.      This is a 69 y.o. female who presents to ER with diarrhea for 2 months.  She states when she gets his episodes of diarrhea she feels shaky and gets diaphoretic and anxious.  She states that normally the anxiety follows the diarrhea rather than the anxiety causing the diarrhea.  She has had decreased food intake because of decreased appetite.  No fevers or chills.  She has had some nausea without vomiting.  No urinary issues.  No abdominal pain.  She states she is been getting this evaluated by her primary care doctor.  She is due to get a CT scan.  She has had multiple blood work tests done.  Heart is regular.  Lungs are clear.  Abdomen is soft and nontender.  She is in no distress.    She has no history of colitis.  She stopped taking her anxiety medicines about a year ago.  She does have hyperlipidemia.  No diabetes or hypertension.    CBC unremarkable troponin negative x 2.  Chemistry unremarkable.    EKG: Sinus rhythm with a ventricular rate of 72.  VA interval is short at 106.  QTc 435.  No ST changes.    CT:  1. Findings suggestive of a mild enterocolitis.  2. Mild hepatic steatosis.  3. Colonic diverticulosis without findings of diverticulitis.  Signed by Collin Goode MD    Patient's primary care doctor did contact my resident and did asked  that we get the lab work run that she had ordered for the patient as an outpatient.  I was able to contact lab.  Initially they stated they cannot run the outpatient lab work from the ER.  They were then able to get the outpatient lab work ran from the blood sent in the ER.  We did asked to send specific tubes of blood for these studies.  Patient is aware she does not need to go to the lab for her rest of her lab work.    Patient is discharged home with Augmentin.  She is to follow-up with her doctor.  She is to return to the nearest ER for any new or worsening symptoms.  With this plan.            ==========================================       Jake Lyman,   08/24/24 1997

## 2024-08-24 NOTE — DISCHARGE INSTRUCTIONS
Seek immediate medical attention if you develop:  worsening abdominal pain, new or worsening nausea, new or worsening vomiting, new or worsening diarrhea, chest pain, shortness of breath, pain with urination, problems urinating, fever, chills, weakness, or any new or worsening symptoms.    Your CT showed enterocolitis

## 2024-08-25 DIAGNOSIS — K76.0 FATTY LIVER: Primary | ICD-10-CM

## 2024-08-26 ENCOUNTER — TELEMEDICINE (OUTPATIENT)
Dept: PRIMARY CARE | Facility: CLINIC | Age: 69
End: 2024-08-26
Payer: MEDICARE

## 2024-08-26 DIAGNOSIS — F41.9 ANXIETY: ICD-10-CM

## 2024-08-26 DIAGNOSIS — R19.7 DIARRHEA, UNSPECIFIED TYPE: ICD-10-CM

## 2024-08-26 DIAGNOSIS — G62.9 PERIPHERAL POLYNEUROPATHY: Primary | ICD-10-CM

## 2024-08-26 DIAGNOSIS — R53.83 LETHARGY: ICD-10-CM

## 2024-08-26 LAB
ATRIAL RATE: 72 BPM
P AXIS: 46 DEGREES
P OFFSET: 211 MS
P ONSET: 169 MS
PR INTERVAL: 106 MS
Q ONSET: 222 MS
QRS COUNT: 11 BEATS
QRS DURATION: 86 MS
QT INTERVAL: 398 MS
QTC CALCULATION(BAZETT): 435 MS
QTC FREDERICIA: 423 MS
R AXIS: 56 DEGREES
T AXIS: 43 DEGREES
T OFFSET: 421 MS
VENTRICULAR RATE: 72 BPM

## 2024-08-26 PROCEDURE — 1160F RVW MEDS BY RX/DR IN RCRD: CPT | Performed by: NURSE PRACTITIONER

## 2024-08-26 PROCEDURE — 99214 OFFICE O/P EST MOD 30 MIN: CPT | Performed by: NURSE PRACTITIONER

## 2024-08-26 PROCEDURE — G2211 COMPLEX E/M VISIT ADD ON: HCPCS | Performed by: NURSE PRACTITIONER

## 2024-08-26 PROCEDURE — 1036F TOBACCO NON-USER: CPT | Performed by: NURSE PRACTITIONER

## 2024-08-26 PROCEDURE — 1123F ACP DISCUSS/DSCN MKR DOCD: CPT | Performed by: NURSE PRACTITIONER

## 2024-08-26 PROCEDURE — 1159F MED LIST DOCD IN RCRD: CPT | Performed by: NURSE PRACTITIONER

## 2024-08-26 RX ORDER — DULOXETIN HYDROCHLORIDE 20 MG/1
CAPSULE, DELAYED RELEASE ORAL
Qty: 58 CAPSULE | Refills: 0 | Status: SHIPPED | OUTPATIENT
Start: 2024-08-26 | End: 2024-10-09

## 2024-08-26 NOTE — PROGRESS NOTES
An interactive audio and video telecommunication system which permits real time communications between the patient (at the originating site) and provider (at the distant site) was utilized to provide this telehealth service.  Verbal consent was requested and obtained from the patient for this telehealth visit.     Subjective   Patient ID: Elena Bueno is a 69 y.o. female who presents for Hospital Follow-up.    Went to ED  During active episode of diarrhea and anxiety  Had a hard time getting blood work drawn that PCP ordered  But they eventually completed it  Got CT of abdomen  Enterocolitis  Lab work not back yet  Feeling better today        Review of Systems  ROS completely negative except what was mentioned in the HPI.  Problem List, surgical, social, and family histories which were reviewed and updated as necessary within the EMR. I also personally reviewed the notes, labs, and imaging that pertained to what was documented or discussed in the HPI.    Objective   Physical Exam  Vitals and nursing note reviewed.   Constitutional:       Appearance: Normal appearance.   HENT:      Head: Normocephalic and atraumatic.      Right Ear: External ear normal.      Left Ear: External ear normal.      Nose: Nose normal.      Mouth/Throat:      Mouth: Mucous membranes are moist.   Eyes:      Extraocular Movements: Extraocular movements intact.      Conjunctiva/sclera: Conjunctivae normal.   Pulmonary:      Effort: Pulmonary effort is normal.   Musculoskeletal:      Cervical back: Normal range of motion and neck supple.   Neurological:      General: No focal deficit present.      Mental Status: She is alert and oriented to person, place, and time. Mental status is at baseline.   Psychiatric:         Mood and Affect: Mood normal.         Behavior: Behavior normal.         Thought Content: Thought content normal.         Judgment: Judgment normal.         LMP  (LMP Unknown)     Assessment/Plan    Problem List Items Addressed This  Visit       Anxiety    Overview     S/p SSRI, Busoar and klonopin  8/21/24: anxiety provoked with unexplained medical symptoms; 7 day xanax RX         Current Assessment & Plan     0.5 mg of xanax caused too much fatigue  Discussed taking 1/2 tablet when episodes occur to see if that helps         Diarrhea    Overview     8/19/24:   Uroporphyrin, 24H Ur: 41 (High)  Normal 0 - 24 ug/24 hr   8/24 CT abd/pelvis: 1. Findings suggestive of a mild enterocolitis. 2. Mild hepatic steatosis.  3. Colonic diverticulosis without findings of diverticulitis         Current Assessment & Plan     Cymbalta causing colitis?  Will trial a wean from 60mg and will decrease by 20 mg every 2 weeks  Is to schedule with GI         Lethargy    Overview     Happened once last year; recent 3 episodes happened within 6 weeks  Sx's start the same  - Day of or day before, becomes very lethargic   - Next day, diarrhea, nausea, intense night sweats, chills, shaking and feels very anxious/impending doom  Generally lasts 1-2 days         Current Assessment & Plan     Will trial weaning down on cymbalta  So see if episodes improve  Await lab results from hospital         Peripheral neuropathy - Primary    Overview     EMG done  On Gabapentin in past  On Lyrica, Cymbalta and Metanx and Biotin  On NSAIDs  Seeing Neuro         Current Assessment & Plan     Will wean down cymbalta   to see if this helps decrease occurrence and severity of her episodes of lethargy followed by diarrhea, intense heat and sweating, and shaking, anxiety          Relevant Medications    DULoxetine (Cymbalta) 20 mg DR capsule

## 2024-08-27 NOTE — ASSESSMENT & PLAN NOTE
0.5 mg of xanax caused too much fatigue  Discussed taking 1/2 tablet when episodes occur to see if that helps

## 2024-08-27 NOTE — ASSESSMENT & PLAN NOTE
Cymbalta causing colitis?  Will trial a wean from 60mg and will decrease by 20 mg every 2 weeks  Is to schedule with GI

## 2024-08-27 NOTE — ASSESSMENT & PLAN NOTE
Will wean down cymbalta   to see if this helps decrease occurrence and severity of her episodes of lethargy followed by diarrhea, intense heat and sweating, and shaking, anxiety

## 2024-08-28 ENCOUNTER — TELEPHONE (OUTPATIENT)
Dept: GASTROENTEROLOGY | Facility: CLINIC | Age: 69
End: 2024-08-28
Payer: MEDICARE

## 2024-08-28 DIAGNOSIS — F41.9 ANXIETY: ICD-10-CM

## 2024-08-28 DIAGNOSIS — R19.7 DIARRHEA, UNSPECIFIED TYPE: ICD-10-CM

## 2024-08-28 LAB
5OH-INDOLEACETATE SERPL-MCNC: 13 NG/ML
GD1B GANGL IGG SER IA-ACNC: 5 IV (ref 0–50)
GD1B GANGL IGM SER IA-ACNC: 7 IV (ref 0–50)
GM1 GANGL IGG SER IA-ACNC: 4 IV (ref 0–50)
GM1 GANGL IGM SER IA-ACNC: 14 IV (ref 0–50)
GQ1B GANGL IGG SER IA-ACNC: 6 IV (ref 0–50)
GQ1B GANGL IGM SER IA-ACNC: 3 IV (ref 0–50)

## 2024-08-28 RX ORDER — ALPRAZOLAM 0.5 MG/1
TABLET ORAL
Qty: 21 TABLET | Refills: 0 | Status: SHIPPED | OUTPATIENT
Start: 2024-08-28

## 2024-08-28 NOTE — TELEPHONE ENCOUNTER
I am unclear of the need/question then    She needs to have done:   Aminolevulinic Acid (ALA), Urine   PBG Deaminase    bc lab said they cannot run these  They would need to be reordered I assume    She saw Megan and discussed anxiety and had a plan with the Xanax  Is she asking for refill? Or different med?  Looped SF in as she saw her

## 2024-08-28 NOTE — TELEPHONE ENCOUNTER
Spoke with patient.  Patient stated she is doing well on the Xanax - that when she needs it, it is working.  The labs were not done. I reordered them and asked to go get them done.  Patient verbally understood.

## 2024-08-28 NOTE — TELEPHONE ENCOUNTER
Please call lab and find out  Maybe they are running the ER ones, I was assuming those were the ones that couldn't be done and that's why they called  I cancelled one of your orders as it was not the right one

## 2024-08-28 NOTE — TELEPHONE ENCOUNTER
Lab client services calling to let us know following labs are cancelled:     Aminolevulinic Acid (ALA), Urine   PBG Deaminase

## 2024-08-29 LAB
COLLECT DURATION TIME SPEC: NORMAL HR
CREAT 24H UR-MRATE: NORMAL MG/D (ref 500–1400)
CREAT UR-MCNC: 64 MG/DL
D-ALA 24H UR-SRATE: NORMAL UMOL/D (ref 0–60)
D-ALA UR-SCNC: 9 UMOL/L (ref 0–35)
SPECIMEN VOL ?TM UR: 58 ML

## 2024-08-29 NOTE — PROGRESS NOTES
REASON FOR VISIT:  Diarrhea     HPI:  Elena Bueno is a 69 y.o. female who presents for a new patient visit     Patient is here for a new patient visit to discuss diarrhea - intermittent  4 episodes over the last 6 months = starts off during the day with fatigue progresses to decreased appetite , nausea,  nocturnal urgency with diarrhea - anxiety seems to be a large part but not sure if it occurs before or after diarrhea - no abd pain, - stools are loose to watery brown stool - no blood or mucus - no change in dark urine - sx will resolve over 24 hours and she will be back to her baseline  She does take a number of supplements - biotin, MVI, Vit E, D3, no Mg  She has been on Cymbalta for approx 6 months   She has been on Meloxicam 15mg daily for 2-3 years  Was in the ED on 8/24 - CT ABD performed , she has a fibroscan ordered  Denies hematochezia, melena, or mucous . Her weight and appetite are stable    Has not ever had a colonoscopy       Fam hx - mother - pancreatic cancer, no IBD , no celiac   Social hx - daily NSAID user , former smoker, social etoh            Med list notable for meloxicam daily     Labs notable for    No fhx of CRC.       Prev endoscopic eval: N/A     REVIEW OF SYSTEMS    Review of Systems   Constitutional: Positive for decreased appetite. Negative for weight loss.   Cardiovascular:  Negative for chest pain.   Respiratory:  Negative for cough and shortness of breath.    Gastrointestinal:  Positive for diarrhea and nausea. Negative for bloating, abdominal pain, anorexia, bowel incontinence, constipation, dysphagia, heartburn, hematemesis, hematochezia, hemorrhoids, melena and vomiting.          Allergies   Allergen Reactions   • Fluoxetine Other     Years ago   Paranoia   (did well on Paxil for years though)   • Niacin Hives   • Doxycycline Diarrhea     esophageal ulcer   • Sulfa (Sulfonamide Antibiotics) Unknown     Sulfa containing compounds       Past Medical History:   Diagnosis Date   •  Abnormal ultrasound of abdomen 11/15/2022    Comment on above: 10/21: 1. Right hepatic lobe lesion measuring up to 0.8 x 0.7cm x 0.7cm cm with imaging characteristics suggestive of a benign hemangioma.2. Previously noted left hepatic lobe lesion is not visualized in the present examination.3. Poor visualization of the pancreas.;   • H/O bone density study     11/16/22: Lowest T score -1.5 10-Year Fracture Risk: Major Osteoporotic Fracture 13.4% Hip Fracture                1.7% 7/16: -1.5 8/20: -1.6, FRAX: Major Osteoporotic Fracture: 13.3%                             Hip Fracture:                1.6%   • H/O chest x-ray 05/18/2023    normal   • H/O CT scan     8/20: CT calcium score=0 POTENTIAL BUT NOT DEFINITIVE 4 MM SUBPLEURAL LEFT LOWER LOBE NONCALCIFIED NODULE VERSUS ATELECTASIS/SCAR. ACCORDING TO 2017 REVISION LUNG NODULE FOLLOW-UP GUIDELINES, EVEN IF THIS IS A HIGH RISK PATIENT (SMOKING OR MALIGNANCY HISTORY), FOLLOW-UP CHEST CT IN ONE YEAR WOULD BE OPTIONAL   • H/O CT scan of abdomen 08/25/2024    Colonic diverticulosis without findings of diverticulitis.  Question mild wall thickening of the colon versus underdistention.  Mildly thickened small bowel loops in the abdomen and pelvis c/w mild enterocoloitis. Mild hepatic steatosis.   • H/O CT scan of chest     3/2021: 1. Stable pulmonary (3-4mm)nodules as above. Per patient risk factors, 12 month follow-up could be obtained as clinically indicated.Pulmonary hyperinflation with emphysematous changes and pleuroparenchymal scarring. Stable prominence of the main pancreatic duct. (4mm), compared to CT 2011. Subacute left-sided rib fractures with bony callus formation. Indeterminate hepatic lesions, 0.8-1cm.   • H/O CT scan of chest 11/18/2023    Stable scattered pulmonary nodules measuring 4 mm or less in size. No new pulmonary nodule or mass   • H/O diagnostic ultrasound     US RUQ: 10/21: 1. Right hepatic lobe lesion measuring up to 0.8 x 0.7cm x 0.7cm cm with  imaging characteristics suggestive of a benign hemangioma. 2. Previously noted left hepatic lobe lesion is not visualized in the present examination. 3. Poor visualization of the pancreas.   • H/O electromyography    • H/O magnetic resonance imaging of lumbar spine 2024    Mild multilevel lumbar spondylosis and facet hypertrophy with no significant spinal canal stenosis and mild scattered neural foraminal narrowing.   • H/O x-ray of lumbar spine 2024    Unremarkable radiographic evaluation of the lumbar spine.       Past Surgical History:   Procedure Laterality Date   • CERVICAL BIOPSY  W/ LOOP ELECTRODE EXCISION     •  SECTION, CLASSIC     • ESOPHAGOGASTRODUODENOSCOPY     • HERNIA REPAIR         Family History   Problem Relation Name Age of Onset   • Pancreatic cancer Mother           ar 88   • Heart failure Father           at 88   • Other (thyroid) Other s    • Obesity Other b    • COPD Other b    • Idiopathic pulmonary fibrosis Other b    • Other (psychiatric issues/ ocd) Other b    • Breast cancer Father's Sister         Social History     Tobacco Use   • Smoking status: Former     Current packs/day: 0.00     Types: Cigarettes     Quit date: 2016     Years since quittin.6   • Smokeless tobacco: Never   Substance Use Topics   • Alcohol use: Yes     Comment: socially       Current Outpatient Medications   Medication Sig Dispense Refill   • ALPRAZolam (Xanax) 0.5 mg tablet Take 1/2 to 1 tablet up to tid prn for anxiety 21 tablet 0   • amoxicillin-pot clavulanate (Augmentin) 875-125 mg tablet Take 1 tablet by mouth every 12 hours for 10 days. 20 tablet 0   • biotin 1 mg capsule Take 1 capsule (1 mg) by mouth once daily. Taking 10,000 mg     • cholecalciferol (Vitamin D-3) 25 MCG (1000 UT) tablet Take 1 tablet (25 mcg) by mouth once daily.     • dexAMETHasone (Decadron) 1 mg tablet Take 1 tablet (1 mg) by mouth 1 time for 1 dose. 1 tablet 0   • DULoxetine (Cymbalta) 20 mg   capsule Take 2 capsules (40 mg) by mouth once daily for 14 days, THEN 1 capsule (20 mg) once daily. Do not crush or chew.. 58 capsule 0   • hydrOXYzine HCL (Atarax) 25 mg tablet Take 1 tablet (25 mg) by mouth every 6 hours if needed for anxiety. 120 tablet 0   • levothyroxine (Synthroid, Levoxyl) 50 mcg tablet TAKE 1 TABLET EVERY DAY 90 tablet 3   • loratadine (Claritin) 10 mg tablet Take 1 tablet (10 mg) by mouth once daily.     • meloxicam (Mobic) 15 mg tablet TAKE 1 TABLET EVERY DAY 90 tablet 3   • multivitamin tablet Take 1 tablet by mouth once daily.     • pregabalin (Lyrica) 200 mg capsule Take 1 capsule (200 mg) by mouth 3 times a day.     • simvastatin (Zocor) 40 mg tablet TAKE 1 TABLET EVERY DAY (NEW DOSE) 90 tablet 3   • vitamin E 180 mg (400 unit) capsule 400-800 units daily 30 capsule 11     No current facility-administered medications for this visit.       PHYSICAL EXAM:  LMP  (LMP Unknown)      Physical Exam  Constitutional:       Comments: Awake   HENT:      Head: Normocephalic.   Cardiovascular:      Rate and Rhythm: Normal rate and regular rhythm.   Pulmonary:      Effort: Pulmonary effort is normal.      Breath sounds: Normal breath sounds.   Abdominal:      General: Bowel sounds are normal.      Palpations: Abdomen is soft.   Neurological:      Mental Status: She is alert.   Psychiatric:         Mood and Affect: Mood normal.        ASSESSMENT  69-year-old female presents for evaluation of intermittent diarrhea.  These episodes have occurred approximately once a month for the past 6 months.  She reports she takes care of her grandchildren during the day she will have some fatigue that will progress to nausea and diarrhea associated with anxiety.  She is unable to identify when the anxiety occurs either before or after the diarrhea.  She will multiple episodes of loose to watery stools without any blood or mucus.  She denies any changes in her urine color.  Her symptoms will resolve within 24 hours  and she will go back to her baseline of regular solid bowel movements daily.  She denies any other associated extraintestinal manifestations or alarm symptoms.  She has never had a colonoscopy.  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  She denied any associated abdominal pain with these episodes.  I would recommend a colonoscopy both for screening as well as for random biopsies to rule out microscopic colitis.  She will continue to follow-up with her primary care physician regarding the porphyria workup.  I would like her to document her overall oral intake the day these episodes occur considering potential dairy products or high-fat meals that might contribute to her symptoms.  She will follow-up in our office in 3 months      Evaluation requested by Dr. Kathryn Kang MD.   My final recommendations will be communicated back to the requesting physician by way of shared Medical record or letter to requesting physician via fax.      Attending Note:   I have personally performed a face to face assessment of this Patient, which included an interview, physical exam and Assessment and Plan.  I have reviewed and confirmed the history and key findings as documented by the Medical Assistant and edited as appropriate.     Signature: Carolina Dahl MD    Date: 8/29/2024  Time: 10:25 AM

## 2024-09-03 ENCOUNTER — APPOINTMENT (OUTPATIENT)
Dept: GASTROENTEROLOGY | Facility: CLINIC | Age: 69
End: 2024-09-03
Payer: MEDICARE

## 2024-09-03 VITALS
HEART RATE: 75 BPM | SYSTOLIC BLOOD PRESSURE: 146 MMHG | DIASTOLIC BLOOD PRESSURE: 86 MMHG | BODY MASS INDEX: 23.74 KG/M2 | TEMPERATURE: 96.8 F | RESPIRATION RATE: 18 BRPM | HEIGHT: 62 IN | OXYGEN SATURATION: 96 % | WEIGHT: 129 LBS

## 2024-09-03 DIAGNOSIS — R19.7 DIARRHEA, UNSPECIFIED TYPE: Primary | ICD-10-CM

## 2024-09-03 DIAGNOSIS — Z12.11 COLON CANCER SCREENING: ICD-10-CM

## 2024-09-03 PROCEDURE — 99204 OFFICE O/P NEW MOD 45 MIN: CPT | Performed by: INTERNAL MEDICINE

## 2024-09-03 PROCEDURE — 1159F MED LIST DOCD IN RCRD: CPT | Performed by: INTERNAL MEDICINE

## 2024-09-03 PROCEDURE — 1036F TOBACCO NON-USER: CPT | Performed by: INTERNAL MEDICINE

## 2024-09-03 PROCEDURE — 3008F BODY MASS INDEX DOCD: CPT | Performed by: INTERNAL MEDICINE

## 2024-09-03 PROCEDURE — 1123F ACP DISCUSS/DSCN MKR DOCD: CPT | Performed by: INTERNAL MEDICINE

## 2024-09-03 RX ORDER — SODIUM, POTASSIUM,MAG SULFATES 17.5-3.13G
1 SOLUTION, RECONSTITUTED, ORAL ORAL 2 TIMES DAILY
Qty: 1 EACH | Refills: 0 | Status: SHIPPED | OUTPATIENT
Start: 2024-09-03 | End: 2024-09-04

## 2024-09-03 ASSESSMENT — ENCOUNTER SYMPTOMS
BLOATING: 0
NAUSEA: 1
ANOREXIA: 0
CONSTIPATION: 0
SHORTNESS OF BREATH: 0
BOWEL INCONTINENCE: 0
DECREASED APPETITE: 1
ABDOMINAL PAIN: 0
HEARTBURN: 0
HEMATOCHEZIA: 0
WEIGHT LOSS: 0
HEMATEMESIS: 0
DIARRHEA: 1
VOMITING: 0
COUGH: 0

## 2024-09-04 ENCOUNTER — APPOINTMENT (OUTPATIENT)
Dept: RADIOLOGY | Facility: HOSPITAL | Age: 69
End: 2024-09-04
Payer: MEDICARE

## 2024-09-04 ENCOUNTER — APPOINTMENT (OUTPATIENT)
Dept: GASTROENTEROLOGY | Facility: CLINIC | Age: 69
End: 2024-09-04
Payer: MEDICARE

## 2024-09-04 ENCOUNTER — PATIENT MESSAGE (OUTPATIENT)
Dept: PRIMARY CARE | Facility: CLINIC | Age: 69
End: 2024-09-04

## 2024-09-04 DIAGNOSIS — R19.7 DIARRHEA, UNSPECIFIED TYPE: Primary | ICD-10-CM

## 2024-09-04 LAB — SOMATOSTATIN: 43 PG/ML

## 2024-09-10 ENCOUNTER — TELEPHONE (OUTPATIENT)
Dept: PRIMARY CARE | Facility: CLINIC | Age: 69
End: 2024-09-10
Payer: MEDICARE

## 2024-09-11 ENCOUNTER — LAB (OUTPATIENT)
Dept: LAB | Facility: LAB | Age: 69
End: 2024-09-11
Payer: MEDICARE

## 2024-09-11 DIAGNOSIS — R61 UNEXPLAINED NIGHT SWEATS: ICD-10-CM

## 2024-09-11 DIAGNOSIS — R19.7 DIARRHEA, UNSPECIFIED TYPE: Primary | ICD-10-CM

## 2024-09-11 DIAGNOSIS — R61 NIGHT SWEAT: ICD-10-CM

## 2024-09-11 DIAGNOSIS — E80.20 PORPHYRIA, UNSPECIFIED PORPHYRIA TYPE (MULTI): ICD-10-CM

## 2024-09-11 DIAGNOSIS — R19.7 DIARRHEA, UNSPECIFIED TYPE: ICD-10-CM

## 2024-09-11 DIAGNOSIS — R53.83 LETHARGY: ICD-10-CM

## 2024-09-11 LAB
ANION GAP SERPL CALC-SCNC: 9 MMOL/L (ref 10–20)
BUN SERPL-MCNC: 21 MG/DL (ref 6–23)
CALCIUM SERPL-MCNC: 9.4 MG/DL (ref 8.6–10.3)
CHLORIDE SERPL-SCNC: 104 MMOL/L (ref 98–107)
CO2 SERPL-SCNC: 30 MMOL/L (ref 21–32)
CREAT SERPL-MCNC: 0.77 MG/DL (ref 0.5–1.05)
EGFRCR SERPLBLD CKD-EPI 2021: 84 ML/MIN/1.73M*2
GLUCOSE SERPL-MCNC: 101 MG/DL (ref 74–99)
POTASSIUM SERPL-SCNC: 4.4 MMOL/L (ref 3.5–5.3)
SODIUM SERPL-SCNC: 139 MMOL/L (ref 136–145)

## 2024-09-11 PROCEDURE — 36415 COLL VENOUS BLD VENIPUNCTURE: CPT

## 2024-09-11 PROCEDURE — 84311 SPECTROPHOTOMETRY: CPT

## 2024-09-11 PROCEDURE — 82135 ASSAY AMINOLEVULINIC ACID: CPT

## 2024-09-11 PROCEDURE — 83520 IMMUNOASSAY QUANT NOS NONAB: CPT

## 2024-09-11 PROCEDURE — 82657 ENZYME CELL ACTIVITY: CPT

## 2024-09-11 PROCEDURE — 84110 ASSAY OF PORPHOBILINOGEN: CPT

## 2024-09-11 PROCEDURE — 80048 BASIC METABOLIC PNL TOTAL CA: CPT

## 2024-09-14 LAB
COLLECT DURATION TIME SPEC: NORMAL HR
CREAT 24H UR-MRATE: NORMAL MG/D (ref 500–1400)
CREAT UR-MCNC: 75 MG/DL
D-ALA 24H UR-SRATE: NORMAL UMOL/D (ref 0–60)
D-ALA UR-SCNC: 12 UMOL/L (ref 0–35)
GD1B GANGL IGG SER IA-ACNC: 5 IV (ref 0–50)
GD1B GANGL IGM SER IA-ACNC: 7 IV (ref 0–50)
GM1 GANGL IGG SER IA-ACNC: 4 IV (ref 0–50)
GM1 GANGL IGM SER IA-ACNC: 16 IV (ref 0–50)
GQ1B GANGL IGG SER IA-ACNC: 5 IV (ref 0–50)
GQ1B GANGL IGM SER IA-ACNC: 4 IV (ref 0–50)
SPECIMEN VOL ?TM UR: NORMAL ML

## 2024-09-16 LAB
CLINICAL BIOCHEMIST REVIEW: NORMAL
PORPHYRINS SERPL-MCNC: <1 MCG/DL
PROVIDER SIGNING NAME: NORMAL

## 2024-09-17 LAB
CLINICAL BIOCHEMIST REVIEW: NORMAL
CLINICAL BIOCHEMIST REVIEW: NORMAL
PBG SYNTHASE BLD-CCNC: 5.7 NMOL/L/SEC
PORPHOBILINOGEN DEAMINASE [ENZYMATIC ACTIVITY/VOLUME] IN BLOOD: 11 NMOL/L/SEC
PROVIDER SIGNING NAME: NORMAL
PROVIDER SIGNING NAME: NORMAL

## 2024-09-20 LAB — PBG UR-MCNC: 1.1 MG/L (ref 0–2)

## 2024-09-23 ENCOUNTER — LAB (OUTPATIENT)
Dept: LAB | Facility: LAB | Age: 69
End: 2024-09-23
Payer: MEDICARE

## 2024-09-23 DIAGNOSIS — R19.7 DIARRHEA, UNSPECIFIED TYPE: ICD-10-CM

## 2024-09-23 PROCEDURE — 36415 COLL VENOUS BLD VENIPUNCTURE: CPT

## 2024-09-24 ENCOUNTER — LAB (OUTPATIENT)
Dept: LAB | Facility: LAB | Age: 69
End: 2024-09-24
Payer: MEDICARE

## 2024-09-24 DIAGNOSIS — R19.5 ELEVATED FECAL CALPROTECTIN: ICD-10-CM

## 2024-09-24 PROCEDURE — 83993 ASSAY FOR CALPROTECTIN FECAL: CPT

## 2024-09-25 ENCOUNTER — TELEPHONE (OUTPATIENT)
Dept: PRIMARY CARE | Facility: CLINIC | Age: 69
End: 2024-09-25

## 2024-09-25 ENCOUNTER — LAB (OUTPATIENT)
Dept: LAB | Facility: LAB | Age: 69
End: 2024-09-25
Payer: MEDICARE

## 2024-09-25 ENCOUNTER — HOSPITAL ENCOUNTER (OUTPATIENT)
Dept: RADIOLOGY | Facility: CLINIC | Age: 69
End: 2024-09-25
Payer: MEDICARE

## 2024-09-25 ENCOUNTER — HOSPITAL ENCOUNTER (OUTPATIENT)
Dept: RADIOLOGY | Facility: HOSPITAL | Age: 69
Discharge: HOME | End: 2024-09-25
Payer: MEDICARE

## 2024-09-25 DIAGNOSIS — R61 UNEXPLAINED NIGHT SWEATS: ICD-10-CM

## 2024-09-25 DIAGNOSIS — E16.8: ICD-10-CM

## 2024-09-25 DIAGNOSIS — R19.7 DIARRHEA, UNSPECIFIED TYPE: ICD-10-CM

## 2024-09-25 PROCEDURE — 82384 ASSAY THREE CATECHOLAMINES: CPT

## 2024-09-25 PROCEDURE — 76376 3D RENDER W/INTRP POSTPROCES: CPT | Performed by: RADIOLOGY

## 2024-09-25 PROCEDURE — A9575 INJ GADOTERATE MEGLUMI 0.1ML: HCPCS | Performed by: INTERNAL MEDICINE

## 2024-09-25 PROCEDURE — 74183 MRI ABD W/O CNTR FLWD CNTR: CPT

## 2024-09-25 PROCEDURE — 2550000001 HC RX 255 CONTRASTS: Performed by: INTERNAL MEDICINE

## 2024-09-25 PROCEDURE — 83497 ASSAY OF 5-HIAA: CPT

## 2024-09-25 PROCEDURE — 74183 MRI ABD W/O CNTR FLWD CNTR: CPT | Performed by: RADIOLOGY

## 2024-09-25 RX ORDER — GADOTERATE MEGLUMINE 376.9 MG/ML
0.2 INJECTION INTRAVENOUS
Status: COMPLETED | OUTPATIENT
Start: 2024-09-25 | End: 2024-09-25

## 2024-09-25 NOTE — TELEPHONE ENCOUNTER
Patient is coming in tomorrow at 7:20 for appointment. She would like a flu shot. Can you please place the order and I can give it to her before the appointment?

## 2024-09-26 ENCOUNTER — APPOINTMENT (OUTPATIENT)
Dept: PRIMARY CARE | Facility: CLINIC | Age: 69
End: 2024-09-26
Payer: MEDICARE

## 2024-09-26 ENCOUNTER — TELEPHONE (OUTPATIENT)
Dept: HEMATOLOGY/ONCOLOGY | Facility: HOSPITAL | Age: 69
End: 2024-09-26

## 2024-09-26 ENCOUNTER — PATIENT OUTREACH (OUTPATIENT)
Dept: HEMATOLOGY/ONCOLOGY | Facility: HOSPITAL | Age: 69
End: 2024-09-26

## 2024-09-26 VITALS
SYSTOLIC BLOOD PRESSURE: 121 MMHG | HEART RATE: 81 BPM | OXYGEN SATURATION: 97 % | DIASTOLIC BLOOD PRESSURE: 75 MMHG | BODY MASS INDEX: 22.87 KG/M2 | WEIGHT: 124.25 LBS | HEIGHT: 62 IN | TEMPERATURE: 96.9 F

## 2024-09-26 DIAGNOSIS — R19.7 DIARRHEA, UNSPECIFIED TYPE: ICD-10-CM

## 2024-09-26 DIAGNOSIS — F41.9 ANXIETY: ICD-10-CM

## 2024-09-26 DIAGNOSIS — Z23 NEED FOR INFLUENZA VACCINATION: Primary | ICD-10-CM

## 2024-09-26 DIAGNOSIS — D49.0 IPMN (INTRADUCTAL PAPILLARY MUCINOUS NEOPLASM): Primary | ICD-10-CM

## 2024-09-26 DIAGNOSIS — G62.9 PERIPHERAL POLYNEUROPATHY: ICD-10-CM

## 2024-09-26 DIAGNOSIS — Z80.0 FAMILY HISTORY OF PANCREATIC CANCER: ICD-10-CM

## 2024-09-26 DIAGNOSIS — F32.1 CURRENT MODERATE EPISODE OF MAJOR DEPRESSIVE DISORDER WITHOUT PRIOR EPISODE (MULTI): ICD-10-CM

## 2024-09-26 DIAGNOSIS — Q45.3 ABNORMALITY OF PANCREATIC DUCT: ICD-10-CM

## 2024-09-26 DIAGNOSIS — R53.83 LETHARGY: ICD-10-CM

## 2024-09-26 DIAGNOSIS — Z00.00 ROUTINE ADULT HEALTH MAINTENANCE: ICD-10-CM

## 2024-09-26 PROBLEM — N28.1 KIDNEY CYST, ACQUIRED: Status: ACTIVE | Noted: 2024-09-26

## 2024-09-26 PROBLEM — K76.89 HEPATIC CYST: Status: ACTIVE | Noted: 2024-09-26

## 2024-09-26 PROCEDURE — 90662 IIV NO PRSV INCREASED AG IM: CPT | Performed by: INTERNAL MEDICINE

## 2024-09-26 PROCEDURE — 1160F RVW MEDS BY RX/DR IN RCRD: CPT | Performed by: INTERNAL MEDICINE

## 2024-09-26 PROCEDURE — 1159F MED LIST DOCD IN RCRD: CPT | Performed by: INTERNAL MEDICINE

## 2024-09-26 PROCEDURE — 99215 OFFICE O/P EST HI 40 MIN: CPT | Performed by: INTERNAL MEDICINE

## 2024-09-26 PROCEDURE — 3008F BODY MASS INDEX DOCD: CPT | Performed by: INTERNAL MEDICINE

## 2024-09-26 PROCEDURE — 1123F ACP DISCUSS/DSCN MKR DOCD: CPT | Performed by: INTERNAL MEDICINE

## 2024-09-26 PROCEDURE — G0008 ADMIN INFLUENZA VIRUS VAC: HCPCS | Performed by: INTERNAL MEDICINE

## 2024-09-26 PROCEDURE — 1036F TOBACCO NON-USER: CPT | Performed by: INTERNAL MEDICINE

## 2024-09-26 RX ORDER — ALPRAZOLAM 1 MG/1
TABLET ORAL
Qty: 21 TABLET | Refills: 0 | Status: SHIPPED | OUTPATIENT
Start: 2024-09-26

## 2024-09-26 RX ORDER — SERTRALINE HYDROCHLORIDE 50 MG/1
50 TABLET, FILM COATED ORAL DAILY
Qty: 90 TABLET | Refills: 3 | Status: SHIPPED | OUTPATIENT
Start: 2024-09-26 | End: 2025-09-26

## 2024-09-26 ASSESSMENT — PATIENT HEALTH QUESTIONNAIRE - PHQ9
5. POOR APPETITE OR OVEREATING: MORE THAN HALF THE DAYS
10. IF YOU CHECKED OFF ANY PROBLEMS, HOW DIFFICULT HAVE THESE PROBLEMS MADE IT FOR YOU TO DO YOUR WORK, TAKE CARE OF THINGS AT HOME, OR GET ALONG WITH OTHER PEOPLE: SOMEWHAT DIFFICULT
3. TROUBLE FALLING OR STAYING ASLEEP OR SLEEPING TOO MUCH: MORE THAN HALF THE DAYS
9. THOUGHTS THAT YOU WOULD BE BETTER OFF DEAD, OR OF HURTING YOURSELF: NOT AT ALL
SUM OF ALL RESPONSES TO PHQ9 QUESTIONS 1 AND 2: 6
SUM OF ALL RESPONSES TO PHQ QUESTIONS 1-9: 17
4. FEELING TIRED OR HAVING LITTLE ENERGY: NEARLY EVERY DAY
7. TROUBLE CONCENTRATING ON THINGS, SUCH AS READING THE NEWSPAPER OR WATCHING TELEVISION: MORE THAN HALF THE DAYS
6. FEELING BAD ABOUT YOURSELF - OR THAT YOU ARE A FAILURE OR HAVE LET YOURSELF OR YOUR FAMILY DOWN: MORE THAN HALF THE DAYS
1. LITTLE INTEREST OR PLEASURE IN DOING THINGS: NEARLY EVERY DAY
8. MOVING OR SPEAKING SO SLOWLY THAT OTHER PEOPLE COULD HAVE NOTICED. OR THE OPPOSITE, BEING SO FIGETY OR RESTLESS THAT YOU HAVE BEEN MOVING AROUND A LOT MORE THAN USUAL: NOT AT ALL
2. FEELING DOWN, DEPRESSED OR HOPELESS: NEARLY EVERY DAY

## 2024-09-26 NOTE — PROGRESS NOTES
RN talked to patient. Patient is wondering if she still needs to come to see Dr. Gillis. Patient is still having diarrhea with sweating and fatigue. She said that the intervals are increasing. Patient recently had a MRI that had showed an IPMN. Patient claims that she had another porphyria lab that was normal. I only saw uroporphyrin level that was elevated. RN asked Dr. Gillis to review the chart to see if she still needs to come. Patient has call back number.     Dr. Gillis did review this patient's chart and he said that this patient does not need to see him. Patient aware of this and she agrees that the appointment should be canceled.

## 2024-09-26 NOTE — Clinical Note
Can you help me? I think she may have some neuroendocrine tumor given her symptoms I have worked her up with labs prior to this MRI, done yesterday Should she get EUS/bx? Or see you? Her GI MD (Carolina) might not be in town- I know she said she was leaving soon. Im waiting to hear back from her also, sent her results this AM at 0530ish. TIA!!

## 2024-09-26 NOTE — ASSESSMENT & PLAN NOTE
? If this is cause for her episodes (? NE tumor of some kind)  Repeat labs pending  Will get consultants advice on next step in eval

## 2024-09-26 NOTE — PROGRESS NOTES
CC/HPI:   Follow-up (Follow up 2 months/Patient received high dose flu vaccine).  Still getting attacks: Starts with overwhelming fatigue, tired (usually the night before or day before) followed by diarrhea, 'anxiety' (anxious feeling, doesn't want to be alone but not psychiatric reason, like going to feel like this forever)- can't tell which comes first as they are both simultaneously-, sweats and hot flashes (Vit E not helping during these episodes), breaks out in full body sweats  Getting more frequent episodes and lasting longer now  9/10 was last one and then one last week  Used to be one day and one day to recover, would get episodes every 4-6 weeks  Last one was last week and still has diarrhea  Not feeling 100% still    MRI done yesterday:  1.  Tubular 20 x 6 mm fluid signal intensity process pancreatic head favors a dilated pancreatic side duct branch potentially indicating site of branch IPMN. The adjacent pancreatic duct is estimated at  about 3 mm.  2. Probably benign liver and kidney cysts    Labs reviewed again:  Normal except:  Norepinephrine/Creatinine, Urine  0 - 45 ug/g CRT 61 High    She is off Cymbalta now (was on it when she had this testing done  Weaned off it bc was only new med   Last dose was Tuesday (repeat labs pending)    Component      Latest Ref Rng 7/24/2024 7/25/2024 7/29/2024   Catecholamines, Urine Interpretation  See Note     Epinephrine, Ur      ug/L  3     Epinephrine, 24H Ur      1 - 14 ug/d  6     Epinephrine/Creatinine, Urine      0 - 20 ug/g CRT  6     Norepinephrine, Ur      ug/L  30     Norepinephrine/Creatinine, Urine      0 - 45 ug/g CRT  61 (H)     Norepinephrine, 24H Urine      14 - 120 ug/d  60     Dopamine, Ur      ug/L  71     Dopamine, 24H Urine      71 - 485 ug/d  142     Dopamine/Creatinine, Urine      0 - 250 ug/g CRT  145     Creatinine, Urine - per volume      mg/dL  49     Creatinine, Urine - per volume        49     Creatinine, 24 Hour Urine      500 - 1400  mg/d  978     Creatinine, 24 Hour Urine        978     Total Volume      mL  1996     Total Volume        1996     Collection period      hr  24     Collection period        24     Metanephrines, Urine Interpretation  See Note     Metanephrines, Urine      ug/L  43     Metanephrines, 24H Urine      36 - 229 ug/d  86     Metanephrine, Urine - ratio to CRT      0 - 300 ug/g CRT  88     Normetanephrine, Urine      ug/L  149     Normetanephrine, 24H Urine      95 - 650 ug/d  297     Normetanephrine, Urine - ratio to CRT      0 - 400 ug/g CRT  304     Anti-SM      <1.0 AI <0.2      Anti-RNP      <1.0 AI <0.2      Anti-SM/RNP      <1.0 AI <0.2      Anti-SSA      <1.0 AI <0.2      Anti-SSB      <1.0 AI <0.2      Anti-SCL-70      <1.0 AI <0.2      Anti-LAVON-1 IgG      <1.0 AI <0.2      Anti-Chromatin      <1.0 AI <0.2      Anti-Centromere      <1.0 AI <0.2      ANTI-RIBOSOMAL P      <1.0 AI <0.2      Anti-DNA (DS)      <5.0 IU/mL <1.0      AGNES      Negative  Positive !      AGNES Pattern Homogeneous      AGNES Titer 1:160      B. burgdorferi VlsE1/pepC10 Abs, NIKKIE      <=0.90 IV 0.81      Pancreatic Elastase, Fecal      >=100 ug/g  478     Calprotectin, Stool      <=49 ug/g  64 (H)     Ova + Parasite Exam      Negative   Negative     Giardia lamblia Antigen      Negative   Negative     Cryptosporidium Antigen by EIA      Negative   Negative     Dexamethasone      ng/dL   247.2    Cortisol  A.M.      5.0 - 20.0 ug/dL   1.6 (L)    Adrenocorticotropic Hormone (ACTH)      7.2 - 63.3 pg/mL   1.6 (L)    DHEA Sulfate      13 - 130 ug/dL   10 (L)      Component      Latest Ref Rng 8/15/2024 8/19/2024 8/24/2024   WBC      4.4 - 11.3 x10*3/uL   7.3    nRBC      0.0 - 0.0 /100 WBCs   0.0    RBC      4.00 - 5.20 x10*6/uL   4.73    HEMOGLOBIN      12.0 - 16.0 g/dL   14.2    HEMATOCRIT      36.0 - 46.0 %   42.4    MCV      80 - 100 fL   90    MCH      26.0 - 34.0 pg   30.0    MCHC      32.0 - 36.0 g/dL   33.5    RED CELL DISTRIBUTION WIDTH       11.5 - 14.5 %   13.2    Platelets      150 - 450 x10*3/uL   208    Neutrophils %      40.0 - 80.0 %   63.5    Immature Granulocytes %, Automated      0.0 - 0.9 %   0.3    Lymphocytes %      13.0 - 44.0 %   28.4    Monocytes %      2.0 - 10.0 %   7.0    Eosinophils %      0.0 - 6.0 %   0.4    Basophils %      0.0 - 2.0 %   0.4    Neutrophils Absolute      1.20 - 7.70 x10*3/uL   4.60    Immature Granulocytes Absolute, Automated      0.00 - 0.70 x10*3/uL   0.02    Lymphocytes Absolute      1.20 - 4.80 x10*3/uL   2.06    Monocytes Absolute      0.10 - 1.00 x10*3/uL   0.51    Eosinophils Absolute      0.00 - 0.70 x10*3/uL   0.03    Basophils Absolute      0.00 - 0.10 x10*3/uL   0.03    GLUCOSE      74 - 99 mg/dL   85    SODIUM      136 - 145 mmol/L   135 (L)    POTASSIUM      3.5 - 5.3 mmol/L   3.8    CHLORIDE      98 - 107 mmol/L   100    Bicarbonate      21 - 32 mmol/L   24    Anion Gap      10 - 20 mmol/L   15    Blood Urea Nitrogen      6 - 23 mg/dL   18    Creatinine      0.50 - 1.05 mg/dL   0.86    EGFR      >60 mL/min/1.73m*2   74    Calcium      8.6 - 10.3 mg/dL   10.3    Albumin      3.4 - 5.0 g/dL   4.5    Alkaline Phosphatase      33 - 136 U/L   73    Total Protein      6.4 - 8.2 g/dL   7.1    AST      9 - 39 U/L   18    Bilirubin Total      0.0 - 1.2 mg/dL   0.6    ALT      7 - 45 U/L   15    Creatinine, Urine - per volume      mg/dL   64    Creatinine, 24 Hour Urine      500 - 1400 mg/d   Not Applicable    Total Volume      mL   58    Collection period      hr   Random    Uroporphyrin, 24H Ur      0 - 24 ug/24 hr  41 (H)     Heptacarboxyl Porphyrin, 24H Ur      0 - 4 ug/24 hr  4     Hexacarboxyl Porphyrin, 24H Ur      0 - 1 ug/24 hr  <1     Pentacarboxyl Porphyrin, 24H Ur      0 - 4 ug/24 hr  <1     Coproporphyrin, 24H Ur      Undefined ug/L  8     Coproporphyrin, 24H Ur      0 - 24 ug/24 hr  12     Coproporphyrin, 24H Ur      0 - 74 ug/24 hr  62     Coproporphyrin III, Urine      Undefined ug/L  42      Heptacarboxyl Porphyrin, Urine      Undefined ug/L  3     Hexacarboxyl Porphyrin, Urine      Undefined ug/L  <1     Pentacarboxyl Porphyrin, Urine      Undefined ug/L  <1     Uroporphyrins, Urine      Undefined ug/L  28     Aminolevulinic Acid, Urine per Volume      0 - 35 umol/L   9    Aminolevulinic Acid, Urine per 24 Hr      0 - 60 umol/d   Not Applicable    GD1b Ganglioside IgG Antibody      0 - 50 IV   5    GD1b Ganglioside IgM Antibody      0 - 50 IV   7    GM-1 Ganglioside IgG Antibody      0 - 50 IV   4    GM-1 Ganglioside IgM Antibody      0 - 50 IV   14    GQ1b Ganglioside IgG Antibody      0 - 50 IV   6    GQ1b Ganglioside IgM Antibody      0 - 50 IV   3    5-HIAA, Urine      Undefined mg/L  2.6     5-HIAA, 24H Urine      0.0 - 14.9 mg/24 hr  3.8     5-Hydroxyindoleacetic Acid (5-HIAA),Plasma      ng/mL 13      Gastrin      0 - 100 pg/mL 39      Somatostatin      ADULT: < OR = 30 pg/mL 43 (H)      Calcitonin      0.0 - 5.1 pg/mL <2.0      MAGNESIUM      1.60 - 2.40 mg/dL   1.99    Troponin I, High Sensitivity      0 - 13 ng/L   9    Troponin I, High Sensitivity         5      Component      Latest Ref [facility] 9/11/2024   GLUCOSE      74 - 99 mg/dL 101 (H)    SODIUM      136 - 145 mmol/L 139    POTASSIUM      3.5 - 5.3 mmol/L 4.4    CHLORIDE      98 - 107 mmol/L 104    Bicarbonate      21 - 32 mmol/L 30    Anion Gap      10 - 20 mmol/L 9 (L)    Blood Urea Nitrogen      6 - 23 mg/dL 21    Creatinine      0.50 - 1.05 mg/dL 0.77    EGFR      >60 mL/min/1.73m*2 84    Calcium      8.6 - 10.3 mg/dL 9.4    Creatinine, Urine - per volume      mg/dL 75    Creatinine, 24 Hour Urine      500 - 1400 mg/d Not Applicable    Total Volume      mL Random    Collection period      hr Random    Aminolevulinic Acid, Urine per Volume      0 - 35 umol/L 12    Aminolevulinic Acid, Urine per 24 Hr      0 - 60 umol/d Not Applicable    GD1b Ganglioside IgG Antibody      0 - 50 IV 5    GD1b Ganglioside IgM Antibody      0 - 50 IV 7     GM-1 Ganglioside IgG Antibody      0 - 50 IV 4    GM-1 Ganglioside IgM Antibody      0 - 50 IV 16    GQ1b Ganglioside IgG Antibody      0 - 50 IV 5    GQ1b Ganglioside IgM Antibody      0 - 50 IV 4    AGNES Titer    Porphyrin Total      <=1.0 mcg/dL <1.0    Reviewed By SEE COMMENTS    Interpretation SEE COMMENTS    PBG Deaminase, WB      nmol/L/sec 11.0    Interpretation SEE COMMENTS    Reviewed by SEE COMMENTS    ALA Dehydratase      nmol/L/sec 5.7    Interpretation SEE COMMENTS    Reviewed By SEE COMMENTS    Porphobilinogen, Qn, Random Ur      0.0 - 2.0 mg/L 1.1       Now feeling anxious over all this   Also feeling down again  No eduarda in life she feels  Has been using xanax daily lately    Assessment and Plan:  Problem List Items Addressed This Visit          Medium    Abnormality of pancreatic duct    Overview     3/2021 CT chest: Borderline dilated main pancreatic duct in the pancreatic body measuring up to 4 mm, however this appears similar compared to remote prior dated 05/03/2011.  CT 8/24: Pancreas normal  MRI abd 9/24: 1.  Tubular 20 x 6 mm fluid signal intensity process pancreatic head favors a dilated pancreatic side duct branch potentially indicating site of branch IPMN. The adjacent pancreatic duct is estimated at about 3 mm.  2. Probably benign liver and kidney cysts             Anxiety    Overview     S/p SSRI, Buspar and klonopin  8/21/24: anxiety provoked with unexplained medical symptoms; 7 day xanax RX         Current Assessment & Plan     Start zoloft  Clive use Xanax and Atarax prn         Relevant Medications    sertraline (Zoloft) 50 mg tablet    ALPRAZolam (Xanax) 1 mg tablet    Current moderate episode of major depressive disorder without prior episode (Multi)    Overview     s/p  hospitalization 2019  at the time: [facility]: 55 -60-51 Moderate symptoms or moderate difficulty in social, occupational or school functioning  Weaned of Paxil at that time.   Failed Pamelor.    Stopped Buspar and  "Wellbutrin in 2022  (Stopped Seroquel in 2021)  On Zoloft in past, stopped and has done fine off medication  Managed by Psychiatry, controlled well now.         Current Assessment & Plan     Will resume Zoloft (tolerated in past)  She thinks she was on 100mg in past  Uptitrate as needed         Relevant Medications    sertraline (Zoloft) 50 mg tablet    Diarrhea    Overview     8/19/24:   Uroporphyrin, 24H Ur: 41 (High)  Normal 0 - 24 ug/24 hr   8/24 CT abd/pelvis: 1. Findings suggestive of a mild enterocolitis. 2. Mild hepatic steatosis.  3. Colonic diverticulosis without findings of diverticulitis         Family history of pancreatic cancer    IPMN (intraductal papillary mucinous neoplasm) - Primary    Overview     9/24: MRIA abd: 1.  Tubular 20 x 6 mm fluid signal intensity process pancreatic head  favors a dilated pancreatic side duct branch potentially indicating  site of branch IPMN. The adjacent pancreatic duct is estimated at  about 3 mm.  2. Probably benign liver and kidney cyst         Current Assessment & Plan     ? If this is cause for her episodes (? NE tumor of some kind)  Repeat labs pending  Will get consultants advice on next step in eval         Lethargy    Overview     Happened once last year; recent 3 episodes happened within 6 weeks  Sx's start the same  - Day of or day before, becomes very lethargic   - Next day, diarrhea, nausea, intense night sweats, chills, shaking and feels very anxious/impending doom  Generally lasts 1-2 days         Peripheral neuropathy    Overview     EMG done  On Gabapentin and Elavil in past  On Lyrica, Cymbalta and Metanx and Biotin  On NSAIDs  Seeing Neuro            ROS otherwise negative aside from what was mentioned above in HPI.    Vitals  /75   Pulse 81   Temp 36.1 °C (96.9 °F)   Ht 1.575 m (5' 2\")   Wt 56.4 kg (124 lb 4 oz)   LMP  (LMP Unknown)   SpO2 97%   BMI 22.73 kg/m²   Body mass index is 22.73 kg/m².  Physical Exam  Gen: Alert, NAD  HEENT: " Unremarkable  Respiratory:  Lungs CTAB  CV: RRR  Neuro:  Gross motor and sensory intact      Allergies and Medications  Fluoxetine, Niacin, Doxycycline, and Sulfa (sulfonamide antibiotics)  Current Outpatient Medications   Medication Instructions    ALPRAZolam (Xanax) 1 mg tablet Take 1 tablet up to tid prn for anxiety    biotin 1 mg capsule 1 capsule, oral, Daily, Taking 10,000 mg     cholecalciferol (Vitamin D-3) 25 MCG (1000 UT) tablet 1 tablet, oral, Daily    DULoxetine (Cymbalta) 20 mg DR capsule Take 2 capsules (40 mg) by mouth once daily for 14 days, THEN 1 capsule (20 mg) once daily. Do not crush or chew..    hydrOXYzine HCL (ATARAX) 25 mg, oral, Every 6 hours PRN    levothyroxine (SYNTHROID, LEVOXYL) 50 mcg, oral, Daily    loratadine (CLARITIN) 10 mg, oral, Daily    meloxicam (MOBIC) 15 mg, oral, Daily    multivitamin tablet 1 tablet, oral, Daily    pregabalin (LYRICA) 200 mg, oral, 3 times daily    sertraline (ZOLOFT) 50 mg, oral, Daily    simvastatin (Zocor) 40 mg tablet TAKE 1 TABLET EVERY DAY (NEW DOSE)    vitamin E 180 mg (400 unit) capsule 400-800 units daily     Time spent prepping/preparing for visit as well as pre/post-visit charting: 10 minutes  Time spent directly with Patient: 30 minutes  Total Time: 40 minutes which included preparing to see the patient, face-to-face patient care, completing clinical documentation, obtaining and/or reviewing separately obtained history, performing a medically appropriate examination, ordering medications, tests, or procedures and discussing the plan of care with her specialists..

## 2024-09-26 NOTE — H&P (VIEW-ONLY)
CC/HPI:   Follow-up (Follow up 2 months/Patient received high dose flu vaccine).  Still getting attacks: Starts with overwhelming fatigue, tired (usually the night before or day before) followed by diarrhea, 'anxiety' (anxious feeling, doesn't want to be alone but not psychiatric reason, like going to feel like this forever)- can't tell which comes first as they are both simultaneously-, sweats and hot flashes (Vit E not helping during these episodes), breaks out in full body sweats  Getting more frequent episodes and lasting longer now  9/10 was last one and then one last week  Used to be one day and one day to recover, would get episodes every 4-6 weeks  Last one was last week and still has diarrhea  Not feeling 100% still    MRI done yesterday:  1.  Tubular 20 x 6 mm fluid signal intensity process pancreatic head favors a dilated pancreatic side duct branch potentially indicating site of branch IPMN. The adjacent pancreatic duct is estimated at  about 3 mm.  2. Probably benign liver and kidney cysts    Labs reviewed again:  Normal except:  Norepinephrine/Creatinine, Urine  0 - 45 ug/g CRT 61 High    She is off Cymbalta now (was on it when she had this testing done  Weaned off it bc was only new med   Last dose was Tuesday (repeat labs pending)    Component      Latest Ref Rng 7/24/2024 7/25/2024 7/29/2024   Catecholamines, Urine Interpretation  See Note     Epinephrine, Ur      ug/L  3     Epinephrine, 24H Ur      1 - 14 ug/d  6     Epinephrine/Creatinine, Urine      0 - 20 ug/g CRT  6     Norepinephrine, Ur      ug/L  30     Norepinephrine/Creatinine, Urine      0 - 45 ug/g CRT  61 (H)     Norepinephrine, 24H Urine      14 - 120 ug/d  60     Dopamine, Ur      ug/L  71     Dopamine, 24H Urine      71 - 485 ug/d  142     Dopamine/Creatinine, Urine      0 - 250 ug/g CRT  145     Creatinine, Urine - per volume      mg/dL  49     Creatinine, Urine - per volume        49     Creatinine, 24 Hour Urine      500 - 1400  mg/d  978     Creatinine, 24 Hour Urine        978     Total Volume      mL  1996     Total Volume        1996     Collection period      hr  24     Collection period        24     Metanephrines, Urine Interpretation  See Note     Metanephrines, Urine      ug/L  43     Metanephrines, 24H Urine      36 - 229 ug/d  86     Metanephrine, Urine - ratio to CRT      0 - 300 ug/g CRT  88     Normetanephrine, Urine      ug/L  149     Normetanephrine, 24H Urine      95 - 650 ug/d  297     Normetanephrine, Urine - ratio to CRT      0 - 400 ug/g CRT  304     Anti-SM      <1.0 AI <0.2      Anti-RNP      <1.0 AI <0.2      Anti-SM/RNP      <1.0 AI <0.2      Anti-SSA      <1.0 AI <0.2      Anti-SSB      <1.0 AI <0.2      Anti-SCL-70      <1.0 AI <0.2      Anti-LAVON-1 IgG      <1.0 AI <0.2      Anti-Chromatin      <1.0 AI <0.2      Anti-Centromere      <1.0 AI <0.2      ANTI-RIBOSOMAL P      <1.0 AI <0.2      Anti-DNA (DS)      <5.0 IU/mL <1.0      AGNES      Negative  Positive !      AGNES Pattern Homogeneous      AGNES Titer 1:160      B. burgdorferi VlsE1/pepC10 Abs, NIKKIE      <=0.90 IV 0.81      Pancreatic Elastase, Fecal      >=100 ug/g  478     Calprotectin, Stool      <=49 ug/g  64 (H)     Ova + Parasite Exam      Negative   Negative     Giardia lamblia Antigen      Negative   Negative     Cryptosporidium Antigen by EIA      Negative   Negative     Dexamethasone      ng/dL   247.2    Cortisol  A.M.      5.0 - 20.0 ug/dL   1.6 (L)    Adrenocorticotropic Hormone (ACTH)      7.2 - 63.3 pg/mL   1.6 (L)    DHEA Sulfate      13 - 130 ug/dL   10 (L)      Component      Latest Ref Rng 8/15/2024 8/19/2024 8/24/2024   WBC      4.4 - 11.3 x10*3/uL   7.3    nRBC      0.0 - 0.0 /100 WBCs   0.0    RBC      4.00 - 5.20 x10*6/uL   4.73    HEMOGLOBIN      12.0 - 16.0 g/dL   14.2    HEMATOCRIT      36.0 - 46.0 %   42.4    MCV      80 - 100 fL   90    MCH      26.0 - 34.0 pg   30.0    MCHC      32.0 - 36.0 g/dL   33.5    RED CELL DISTRIBUTION WIDTH       11.5 - 14.5 %   13.2    Platelets      150 - 450 x10*3/uL   208    Neutrophils %      40.0 - 80.0 %   63.5    Immature Granulocytes %, Automated      0.0 - 0.9 %   0.3    Lymphocytes %      13.0 - 44.0 %   28.4    Monocytes %      2.0 - 10.0 %   7.0    Eosinophils %      0.0 - 6.0 %   0.4    Basophils %      0.0 - 2.0 %   0.4    Neutrophils Absolute      1.20 - 7.70 x10*3/uL   4.60    Immature Granulocytes Absolute, Automated      0.00 - 0.70 x10*3/uL   0.02    Lymphocytes Absolute      1.20 - 4.80 x10*3/uL   2.06    Monocytes Absolute      0.10 - 1.00 x10*3/uL   0.51    Eosinophils Absolute      0.00 - 0.70 x10*3/uL   0.03    Basophils Absolute      0.00 - 0.10 x10*3/uL   0.03    GLUCOSE      74 - 99 mg/dL   85    SODIUM      136 - 145 mmol/L   135 (L)    POTASSIUM      3.5 - 5.3 mmol/L   3.8    CHLORIDE      98 - 107 mmol/L   100    Bicarbonate      21 - 32 mmol/L   24    Anion Gap      10 - 20 mmol/L   15    Blood Urea Nitrogen      6 - 23 mg/dL   18    Creatinine      0.50 - 1.05 mg/dL   0.86    EGFR      >60 mL/min/1.73m*2   74    Calcium      8.6 - 10.3 mg/dL   10.3    Albumin      3.4 - 5.0 g/dL   4.5    Alkaline Phosphatase      33 - 136 U/L   73    Total Protein      6.4 - 8.2 g/dL   7.1    AST      9 - 39 U/L   18    Bilirubin Total      0.0 - 1.2 mg/dL   0.6    ALT      7 - 45 U/L   15    Creatinine, Urine - per volume      mg/dL   64    Creatinine, 24 Hour Urine      500 - 1400 mg/d   Not Applicable    Total Volume      mL   58    Collection period      hr   Random    Uroporphyrin, 24H Ur      0 - 24 ug/24 hr  41 (H)     Heptacarboxyl Porphyrin, 24H Ur      0 - 4 ug/24 hr  4     Hexacarboxyl Porphyrin, 24H Ur      0 - 1 ug/24 hr  <1     Pentacarboxyl Porphyrin, 24H Ur      0 - 4 ug/24 hr  <1     Coproporphyrin, 24H Ur      Undefined ug/L  8     Coproporphyrin, 24H Ur      0 - 24 ug/24 hr  12     Coproporphyrin, 24H Ur      0 - 74 ug/24 hr  62     Coproporphyrin III, Urine      Undefined ug/L  42      Heptacarboxyl Porphyrin, Urine      Undefined ug/L  3     Hexacarboxyl Porphyrin, Urine      Undefined ug/L  <1     Pentacarboxyl Porphyrin, Urine      Undefined ug/L  <1     Uroporphyrins, Urine      Undefined ug/L  28     Aminolevulinic Acid, Urine per Volume      0 - 35 umol/L   9    Aminolevulinic Acid, Urine per 24 Hr      0 - 60 umol/d   Not Applicable    GD1b Ganglioside IgG Antibody      0 - 50 IV   5    GD1b Ganglioside IgM Antibody      0 - 50 IV   7    GM-1 Ganglioside IgG Antibody      0 - 50 IV   4    GM-1 Ganglioside IgM Antibody      0 - 50 IV   14    GQ1b Ganglioside IgG Antibody      0 - 50 IV   6    GQ1b Ganglioside IgM Antibody      0 - 50 IV   3    5-HIAA, Urine      Undefined mg/L  2.6     5-HIAA, 24H Urine      0.0 - 14.9 mg/24 hr  3.8     5-Hydroxyindoleacetic Acid (5-HIAA),Plasma      ng/mL 13      Gastrin      0 - 100 pg/mL 39      Somatostatin      ADULT: < OR = 30 pg/mL 43 (H)      Calcitonin      0.0 - 5.1 pg/mL <2.0      MAGNESIUM      1.60 - 2.40 mg/dL   1.99    Troponin I, High Sensitivity      0 - 13 ng/L   9    Troponin I, High Sensitivity         5      Component      Latest Ref [facility] 9/11/2024   GLUCOSE      74 - 99 mg/dL 101 (H)    SODIUM      136 - 145 mmol/L 139    POTASSIUM      3.5 - 5.3 mmol/L 4.4    CHLORIDE      98 - 107 mmol/L 104    Bicarbonate      21 - 32 mmol/L 30    Anion Gap      10 - 20 mmol/L 9 (L)    Blood Urea Nitrogen      6 - 23 mg/dL 21    Creatinine      0.50 - 1.05 mg/dL 0.77    EGFR      >60 mL/min/1.73m*2 84    Calcium      8.6 - 10.3 mg/dL 9.4    Creatinine, Urine - per volume      mg/dL 75    Creatinine, 24 Hour Urine      500 - 1400 mg/d Not Applicable    Total Volume      mL Random    Collection period      hr Random    Aminolevulinic Acid, Urine per Volume      0 - 35 umol/L 12    Aminolevulinic Acid, Urine per 24 Hr      0 - 60 umol/d Not Applicable    GD1b Ganglioside IgG Antibody      0 - 50 IV 5    GD1b Ganglioside IgM Antibody      0 - 50 IV 7     GM-1 Ganglioside IgG Antibody      0 - 50 IV 4    GM-1 Ganglioside IgM Antibody      0 - 50 IV 16    GQ1b Ganglioside IgG Antibody      0 - 50 IV 5    GQ1b Ganglioside IgM Antibody      0 - 50 IV 4    AGNES Titer    Porphyrin Total      <=1.0 mcg/dL <1.0    Reviewed By SEE COMMENTS    Interpretation SEE COMMENTS    PBG Deaminase, WB      nmol/L/sec 11.0    Interpretation SEE COMMENTS    Reviewed by SEE COMMENTS    ALA Dehydratase      nmol/L/sec 5.7    Interpretation SEE COMMENTS    Reviewed By SEE COMMENTS    Porphobilinogen, Qn, Random Ur      0.0 - 2.0 mg/L 1.1       Now feeling anxious over all this   Also feeling down again  No eduarda in life she feels  Has been using xanax daily lately    Assessment and Plan:  Problem List Items Addressed This Visit          Medium    Abnormality of pancreatic duct    Overview     3/2021 CT chest: Borderline dilated main pancreatic duct in the pancreatic body measuring up to 4 mm, however this appears similar compared to remote prior dated 05/03/2011.  CT 8/24: Pancreas normal  MRI abd 9/24: 1.  Tubular 20 x 6 mm fluid signal intensity process pancreatic head favors a dilated pancreatic side duct branch potentially indicating site of branch IPMN. The adjacent pancreatic duct is estimated at about 3 mm.  2. Probably benign liver and kidney cysts             Anxiety    Overview     S/p SSRI, Buspar and klonopin  8/21/24: anxiety provoked with unexplained medical symptoms; 7 day xanax RX         Current Assessment & Plan     Start zoloft  Clive use Xanax and Atarax prn         Relevant Medications    sertraline (Zoloft) 50 mg tablet    ALPRAZolam (Xanax) 1 mg tablet    Current moderate episode of major depressive disorder without prior episode (Multi)    Overview     s/p  hospitalization 2019  at the time: [facility]: 55 -60-51 Moderate symptoms or moderate difficulty in social, occupational or school functioning  Weaned of Paxil at that time.   Failed Pamelor.    Stopped Buspar and  "Wellbutrin in 2022  (Stopped Seroquel in 2021)  On Zoloft in past, stopped and has done fine off medication  Managed by Psychiatry, controlled well now.         Current Assessment & Plan     Will resume Zoloft (tolerated in past)  She thinks she was on 100mg in past  Uptitrate as needed         Relevant Medications    sertraline (Zoloft) 50 mg tablet    Diarrhea    Overview     8/19/24:   Uroporphyrin, 24H Ur: 41 (High)  Normal 0 - 24 ug/24 hr   8/24 CT abd/pelvis: 1. Findings suggestive of a mild enterocolitis. 2. Mild hepatic steatosis.  3. Colonic diverticulosis without findings of diverticulitis         Family history of pancreatic cancer    IPMN (intraductal papillary mucinous neoplasm) - Primary    Overview     9/24: MRIA abd: 1.  Tubular 20 x 6 mm fluid signal intensity process pancreatic head  favors a dilated pancreatic side duct branch potentially indicating  site of branch IPMN. The adjacent pancreatic duct is estimated at  about 3 mm.  2. Probably benign liver and kidney cyst         Current Assessment & Plan     ? If this is cause for her episodes (? NE tumor of some kind)  Repeat labs pending  Will get consultants advice on next step in eval         Lethargy    Overview     Happened once last year; recent 3 episodes happened within 6 weeks  Sx's start the same  - Day of or day before, becomes very lethargic   - Next day, diarrhea, nausea, intense night sweats, chills, shaking and feels very anxious/impending doom  Generally lasts 1-2 days         Peripheral neuropathy    Overview     EMG done  On Gabapentin and Elavil in past  On Lyrica, Cymbalta and Metanx and Biotin  On NSAIDs  Seeing Neuro            ROS otherwise negative aside from what was mentioned above in HPI.    Vitals  /75   Pulse 81   Temp 36.1 °C (96.9 °F)   Ht 1.575 m (5' 2\")   Wt 56.4 kg (124 lb 4 oz)   LMP  (LMP Unknown)   SpO2 97%   BMI 22.73 kg/m²   Body mass index is 22.73 kg/m².  Physical Exam  Gen: Alert, NAD  HEENT: " Unremarkable  Respiratory:  Lungs CTAB  CV: RRR  Neuro:  Gross motor and sensory intact      Allergies and Medications  Fluoxetine, Niacin, Doxycycline, and Sulfa (sulfonamide antibiotics)  Current Outpatient Medications   Medication Instructions    ALPRAZolam (Xanax) 1 mg tablet Take 1 tablet up to tid prn for anxiety    biotin 1 mg capsule 1 capsule, oral, Daily, Taking 10,000 mg     cholecalciferol (Vitamin D-3) 25 MCG (1000 UT) tablet 1 tablet, oral, Daily    DULoxetine (Cymbalta) 20 mg DR capsule Take 2 capsules (40 mg) by mouth once daily for 14 days, THEN 1 capsule (20 mg) once daily. Do not crush or chew..    hydrOXYzine HCL (ATARAX) 25 mg, oral, Every 6 hours PRN    levothyroxine (SYNTHROID, LEVOXYL) 50 mcg, oral, Daily    loratadine (CLARITIN) 10 mg, oral, Daily    meloxicam (MOBIC) 15 mg, oral, Daily    multivitamin tablet 1 tablet, oral, Daily    pregabalin (LYRICA) 200 mg, oral, 3 times daily    sertraline (ZOLOFT) 50 mg, oral, Daily    simvastatin (Zocor) 40 mg tablet TAKE 1 TABLET EVERY DAY (NEW DOSE)    vitamin E 180 mg (400 unit) capsule 400-800 units daily     Time spent prepping/preparing for visit as well as pre/post-visit charting: 10 minutes  Time spent directly with Patient: 30 minutes  Total Time: 40 minutes which included preparing to see the patient, face-to-face patient care, completing clinical documentation, obtaining and/or reviewing separately obtained history, performing a medically appropriate examination, ordering medications, tests, or procedures and discussing the plan of care with her specialists..

## 2024-09-26 NOTE — TELEPHONE ENCOUNTER
RN called patient and left a message on voice. RN called to ask her some questions regarding her appointment  with Dr. Gillis. Call back instructions reviewed.

## 2024-09-28 LAB
CALPROTECTIN STL-MCNT: 28 UG/G
CATECHOLS UR-IMP: NORMAL
COLLECT DURATION TIME SPEC: 24 HR
CREAT 24H UR-MRATE: 858 MG/D (ref 500–1400)
CREAT UR-MCNC: 75 MG/DL
DOPAMINE 24H UR-MRATE: 132 UG/D (ref 71–485)
DOPAMINE UR-MCNC: 115 UG/L
DOPAMINE/CREAT UR: 153 UG/G CRT (ref 0–250)
EPINEPH 24H UR-MRATE: 6 UG/D (ref 1–14)
EPINEPH UR-MCNC: 5 UG/L
EPINEPH/CREAT UR: 7 UG/G CRT (ref 0–20)
NOREPINEPH 24H UR-MRATE: 37 UG/D (ref 14–120)
NOREPINEPH UR-MCNC: 32 UG/L
NOREPINEPH/CREAT UR: 43 UG/G CRT (ref 0–45)
SPECIMEN VOL ?TM UR: 1144 ML

## 2024-10-01 LAB
5OH-INDOLEACETATE 24H UR-MCNC: 2.6 MG/L
5OH-INDOLEACETATE 24H UR-MRATE: 3 MG/24 HR (ref 0–14.9)

## 2024-10-02 ENCOUNTER — TELEPHONE (OUTPATIENT)
Dept: GASTROENTEROLOGY | Facility: CLINIC | Age: 69
End: 2024-10-02
Payer: MEDICARE

## 2024-10-02 ENCOUNTER — APPOINTMENT (OUTPATIENT)
Dept: HEMATOLOGY/ONCOLOGY | Facility: HOSPITAL | Age: 69
End: 2024-10-02
Payer: MEDICARE

## 2024-10-02 LAB — SCAN RESULT: NORMAL

## 2024-10-02 NOTE — TELEPHONE ENCOUNTER
Patient called stating she was under the impression Dr. Kang wanted her to have an EGD added to her colonoscopy.

## 2024-10-03 NOTE — TELEPHONE ENCOUNTER
Spoke with patient. She is aware that she needs an EUS/EGD with an advanced provider. She was sent to coordinator for scheduling.

## 2024-10-10 ENCOUNTER — APPOINTMENT (OUTPATIENT)
Dept: RADIOLOGY | Facility: HOSPITAL | Age: 69
End: 2024-10-10
Payer: MEDICARE

## 2024-10-17 ENCOUNTER — APPOINTMENT (OUTPATIENT)
Dept: PRIMARY CARE | Facility: CLINIC | Age: 69
End: 2024-10-17
Payer: MEDICARE

## 2024-10-17 DIAGNOSIS — R19.7 DIARRHEA, UNSPECIFIED TYPE: Primary | ICD-10-CM

## 2024-10-17 DIAGNOSIS — F41.9 ANXIETY: ICD-10-CM

## 2024-10-17 DIAGNOSIS — F32.1 CURRENT MODERATE EPISODE OF MAJOR DEPRESSIVE DISORDER WITHOUT PRIOR EPISODE (MULTI): ICD-10-CM

## 2024-10-17 RX ORDER — SERTRALINE HYDROCHLORIDE 100 MG/1
100 TABLET, FILM COATED ORAL DAILY
Qty: 90 TABLET | Refills: 3 | Status: SHIPPED | OUTPATIENT
Start: 2024-10-17 | End: 2025-10-17

## 2024-10-17 NOTE — PROGRESS NOTES
An interactive audio/visual telecommunication system which permits real time communications between the patient (at the originating site) and provider (at the distant site) was utilized to provide this telehealth service.    Verbal consent was requested and obtained from the patient for this telehealth visit.  We have confirmed the patient wishes to see me, Radha Medina, a board certified family nurse practitioner with an active [facility] license as well as verified the patient's identity and physical location in [place].    Problem List Items Addressed This Visit       Anxiety    Overview     S/p SSRI, Buspar and klonopin  8/21/24: anxiety provoked with unexplained medical symptoms; 7 day xanax RX  10/17/24: increase zoloft from 50 mg every day to 100 mg every day. monitor         Relevant Medications    sertraline (Zoloft) 100 mg tablet    Current moderate episode of major depressive disorder without prior episode (Multi)    Overview     s/p  hospitalization 2019  at the time: [facility]: 55 -60-51 Moderate symptoms or moderate difficulty in social, occupational or school functioning  Weaned of Paxil at that time.   Failed Pamelor.    Stopped Buspar and Wellbutrin in 2022  (Stopped Seroquel in 2021)  10/17/24: restarted zoloft 50 mg in Sept. Increase to 100 mg every day today.         Relevant Medications    sertraline (Zoloft) 100 mg tablet    Diarrhea - Primary    Overview     8/19/24:   Uroporphyrin, 24H Ur: 41 (High)  Normal 0 - 24 ug/24 hr   8/24 CT abd/pelvis: 1. Findings suggestive of a mild enterocolitis. 2. Mild hepatic steatosis.  3. Colonic diverticulosis without findings of diverticulitis  10/17/24: less frequent now that she's started zoloft. She is scheduled upcoming for EGD/cscope             Subjective   Patient ID: Elena Bueno is a 69 y.o. female who presents for Follow-up.  HPI  Anxiety & depression   Zoloft 50 mg every day  Doing well  No SE    Diarrhea  Scheduled for EGD and cscope  Had episode of  diarrhea  (mini episode x 3) but no high anxiety with it as previous  Watery stool this morning    Component      Latest Ref Rng 9/11/2024 9/23/2024   Catecholamines, Urine Interpretation     Epinephrine, Ur      ug/L     Epinephrine, 24H Ur      1 - 14 ug/d     Epinephrine/Creatinine, Urine      0 - 20 ug/g CRT     Norepinephrine, Ur      ug/L     Norepinephrine/Creatinine, Urine      0 - 45 ug/g CRT     Norepinephrine, 24H Urine      14 - 120 ug/d     Dopamine, Ur      ug/L     Dopamine, 24H Urine      71 - 485 ug/d     Dopamine/Creatinine, Urine      0 - 250 ug/g CRT     Creatinine, Urine - per volume      mg/dL 75     Creatinine, 24 Hour Urine      500 - 1400 mg/d Not Applicable     Total Volume      mL Random     Collection period      hr Random     GLUCOSE      74 - 99 mg/dL 101 (H)     SODIUM      136 - 145 mmol/L 139     POTASSIUM      3.5 - 5.3 mmol/L 4.4     CHLORIDE      98 - 107 mmol/L 104     Bicarbonate      21 - 32 mmol/L 30     Anion Gap      10 - 20 mmol/L 9 (L)     Blood Urea Nitrogen      6 - 23 mg/dL 21     Creatinine      0.50 - 1.05 mg/dL 0.77     EGFR      >60 mL/min/1.73m*2 84     Calcium      8.6 - 10.3 mg/dL 9.4     GD1b Ganglioside IgG Antibody      0 - 50 IV 5     GD1b Ganglioside IgM Antibody      0 - 50 IV 7     GM-1 Ganglioside IgG Antibody      0 - 50 IV 4     GM-1 Ganglioside IgM Antibody      0 - 50 IV 16     GQ1b Ganglioside IgG Antibody      0 - 50 IV 5     GQ1b Ganglioside IgM Antibody      0 - 50 IV 4     Aminolevulinic Acid, Urine per Volume      0 - 35 umol/L 12     Aminolevulinic Acid, Urine per 24 Hr      0 - 60 umol/d Not Applicable     Porphyrin Total      <=1.0 mcg/dL <1.0     Reviewed By SEE COMMENTS     Interpretation SEE COMMENTS     PBG Deaminase, WB      nmol/L/sec 11.0     Interpretation SEE COMMENTS     Reviewed by SEE COMMENTS     ALA Dehydratase      nmol/L/sec 5.7     Interpretation SEE COMMENTS     Reviewed By SEE COMMENTS     5-HIAA, Urine      Undefined mg/L      5-HIAA, 24H Urine      0.0 - 14.9 mg/24 hr     Porphobilinogen, Qn, Random Ur      0.0 - 2.0 mg/L 1.1     Scan Result  See Scanned Result    Calprotectin, Stool      <=49 ug/g       Component      Latest Ref Rng 9/24/2024 9/25/2024   Catecholamines, Urine Interpretation  See Note    Epinephrine, Ur      ug/L  5    Epinephrine, 24H Ur      1 - 14 ug/d  6    Epinephrine/Creatinine, Urine      0 - 20 ug/g CRT  7    Norepinephrine, Ur      ug/L  32    Norepinephrine/Creatinine, Urine      0 - 45 ug/g CRT  43    Norepinephrine, 24H Urine      14 - 120 ug/d  37    Dopamine, Ur      ug/L  115    Dopamine, 24H Urine      71 - 485 ug/d  132    Dopamine/Creatinine, Urine      0 - 250 ug/g CRT  153    Creatinine, Urine - per volume      mg/dL  75    Creatinine, 24 Hour Urine      500 - 1400 mg/d  858    Total Volume      mL  1144    Collection period      hr  24    GLUCOSE      74 - 99 mg/dL     SODIUM      136 - 145 mmol/L     POTASSIUM      3.5 - 5.3 mmol/L     CHLORIDE      98 - 107 mmol/L     Bicarbonate      21 - 32 mmol/L     Anion Gap      10 - 20 mmol/L     Blood Urea Nitrogen      6 - 23 mg/dL     Creatinine      0.50 - 1.05 mg/dL     EGFR      >60 mL/min/1.73m*2     Calcium      8.6 - 10.3 mg/dL     GD1b Ganglioside IgG Antibody      0 - 50 IV     GD1b Ganglioside IgM Antibody      0 - 50 IV     GM-1 Ganglioside IgG Antibody      0 - 50 IV     GM-1 Ganglioside IgM Antibody      0 - 50 IV     GQ1b Ganglioside IgG Antibody      0 - 50 IV     GQ1b Ganglioside IgM Antibody      0 - 50 IV     Aminolevulinic Acid, Urine per Volume      0 - 35 umol/L     Aminolevulinic Acid, Urine per 24 Hr      0 - 60 umol/d     Porphyrin Total      <=1.0 mcg/dL     Reviewed By     Interpretation     PBG Deaminase, WB      nmol/L/sec     Interpretation     Reviewed by     ALA Dehydratase      nmol/L/sec     Interpretation     Reviewed By     5-HIAA, Urine      Undefined mg/L  2.6    5-HIAA, 24H Urine      0.0 - 14.9 mg/24 hr  3.0   "  Porphobilinogen, Qn, Random Ur      0.0 - 2.0 mg/L     Scan Result     Calprotectin, Stool      <=49 ug/g 28            Review of Systems   All other systems reviewed and are negative.      BP Readings from Last 3 Encounters:   09/26/24 121/75   09/03/24 146/86   08/24/24 109/74      Wt Readings from Last 3 Encounters:   09/26/24 56.4 kg (124 lb 4 oz)   09/03/24 58.5 kg (129 lb)   08/24/24 55.3 kg (122 lb)      BMI:   Estimated body mass index is 22.73 kg/m² as calculated from the following:    Height as of 9/26/24: 1.575 m (5' 2\").    Weight as of 9/26/24: 56.4 kg (124 lb 4 oz).    Objective   Physical Exam  Gen: Alert, NAD  Respiratory:  resp effort NL, speaking in complete sentences   Neuro:  coordination intact   Mood: normal    Sitting upright    "

## 2024-10-22 PROBLEM — L71.9 ROSACEA: Status: RESOLVED | Noted: 2023-02-03 | Resolved: 2024-10-22

## 2024-10-23 ENCOUNTER — ANESTHESIA (OUTPATIENT)
Dept: GASTROENTEROLOGY | Facility: HOSPITAL | Age: 69
End: 2024-10-23
Payer: MEDICARE

## 2024-10-23 ENCOUNTER — ANESTHESIA EVENT (OUTPATIENT)
Dept: GASTROENTEROLOGY | Facility: HOSPITAL | Age: 69
End: 2024-10-23
Payer: MEDICARE

## 2024-10-23 ENCOUNTER — HOSPITAL ENCOUNTER (OUTPATIENT)
Dept: GASTROENTEROLOGY | Facility: HOSPITAL | Age: 69
Setting detail: OUTPATIENT SURGERY
Discharge: HOME | End: 2024-10-23
Payer: MEDICARE

## 2024-10-23 VITALS
RESPIRATION RATE: 16 BRPM | HEIGHT: 62 IN | TEMPERATURE: 97.2 F | WEIGHT: 125 LBS | OXYGEN SATURATION: 95 % | SYSTOLIC BLOOD PRESSURE: 129 MMHG | DIASTOLIC BLOOD PRESSURE: 75 MMHG | HEART RATE: 56 BPM | BODY MASS INDEX: 23 KG/M2

## 2024-10-23 DIAGNOSIS — E80.20: Primary | ICD-10-CM

## 2024-10-23 PROCEDURE — 7100000010 HC PHASE TWO TIME - EACH INCREMENTAL 1 MINUTE

## 2024-10-23 PROCEDURE — 7100000009 HC PHASE TWO TIME - INITIAL BASE CHARGE

## 2024-10-23 PROCEDURE — 2500000005 HC RX 250 GENERAL PHARMACY W/O HCPCS: Performed by: ANESTHESIOLOGIST ASSISTANT

## 2024-10-23 PROCEDURE — 3700000002 HC GENERAL ANESTHESIA TIME - EACH INCREMENTAL 1 MINUTE

## 2024-10-23 PROCEDURE — 2500000004 HC RX 250 GENERAL PHARMACY W/ HCPCS (ALT 636 FOR OP/ED): Performed by: ANESTHESIOLOGIST ASSISTANT

## 2024-10-23 PROCEDURE — 43237 ENDOSCOPIC US EXAM ESOPH: CPT | Performed by: INTERNAL MEDICINE

## 2024-10-23 PROCEDURE — 3700000001 HC GENERAL ANESTHESIA TIME - INITIAL BASE CHARGE

## 2024-10-23 RX ORDER — PROPOFOL 10 MG/ML
INJECTION, EMULSION INTRAVENOUS AS NEEDED
Status: DISCONTINUED | OUTPATIENT
Start: 2024-10-23 | End: 2024-10-23

## 2024-10-23 RX ORDER — LIDOCAINE HCL/PF 100 MG/5ML
SYRINGE (ML) INTRAVENOUS AS NEEDED
Status: DISCONTINUED | OUTPATIENT
Start: 2024-10-23 | End: 2024-10-23

## 2024-10-23 RX ORDER — FENTANYL CITRATE 50 UG/ML
INJECTION, SOLUTION INTRAMUSCULAR; INTRAVENOUS AS NEEDED
Status: DISCONTINUED | OUTPATIENT
Start: 2024-10-23 | End: 2024-10-23

## 2024-10-23 SDOH — HEALTH STABILITY: MENTAL HEALTH: CURRENT SMOKER: 0

## 2024-10-23 ASSESSMENT — PAIN - FUNCTIONAL ASSESSMENT
PAIN_FUNCTIONAL_ASSESSMENT: 0-10

## 2024-10-23 ASSESSMENT — COLUMBIA-SUICIDE SEVERITY RATING SCALE - C-SSRS
2. HAVE YOU ACTUALLY HAD ANY THOUGHTS OF KILLING YOURSELF?: NO
6. HAVE YOU EVER DONE ANYTHING, STARTED TO DO ANYTHING, OR PREPARED TO DO ANYTHING TO END YOUR LIFE?: NO
1. IN THE PAST MONTH, HAVE YOU WISHED YOU WERE DEAD OR WISHED YOU COULD GO TO SLEEP AND NOT WAKE UP?: NO

## 2024-10-23 ASSESSMENT — PAIN SCALES - GENERAL
PAINLEVEL_OUTOF10: 0 - NO PAIN

## 2024-10-23 NOTE — ANESTHESIA PREPROCEDURE EVALUATION
Patient: Elena Bueno    Procedure Information       Date/Time: 10/23/24 0730    Scheduled providers: Zaina Hubbard MD    Procedure: ENDOSCOPIC ULTRASOUND (UPPER)    Location: [facility] [place]            Relevant Problems   Cardiac   (+) Hyperlipidemia, mixed      Neuro   (+) Anxiety   (+) Current moderate episode of major depressive disorder without prior episode (Multi)   (+) Peripheral neuropathy      GI   (+) Esophageal ulcer      Liver   (+) Fatty liver   (+) Hepatic cyst   (+) Hepatic hemangioma      Endocrine   (+) Hypothyroidism, adult      Musculoskeletal   (+) Lumbar spondylosis       Clinical information reviewed:   Tobacco  Allergies  Meds   Med Hx  Surg Hx   Fam Hx  Soc Hx        NPO Detail:  NPO/Void Status  Date of Last Liquid: 10/22/24  Time of Last Liquid:   Date of Last Solid: 10/22/24  Time of Last Solid:   Last Intake Type: Clear fluids  Time of Last Void: 630         Physical Exam    Airway  Mallampati: I  TM distance: >3 FB  Neck ROM: full     Cardiovascular - normal exam     Dental - normal exam     Pulmonary - normal exam     Abdominal - normal exam           Vitals:    10/23/24 0710   BP: 120/68   Pulse: 58   Resp: 16   Temp: 36.7 °C (98.1 °F)   SpO2: 98%       Past Surgical History:   Procedure Laterality Date    CERVICAL BIOPSY  W/ LOOP ELECTRODE EXCISION       SECTION, CLASSIC      ESOPHAGOGASTRODUODENOSCOPY      HERNIA REPAIR       Past Medical History:   Diagnosis Date    Abnormal ultrasound of abdomen 11/15/2022    Comment on above: 10/21: 1. Right hepatic lobe lesion measuring up to 0.8 x 0.7cm x 0.7cm cm with imaging characteristics suggestive of a benign hemangioma.2. Previously noted left hepatic lobe lesion is not visualized in the present examination.3. Poor visualization of the pancreas.;    H/O bone density study     22: Lowest T score -1.5 10-Year Fracture Risk: Major Osteoporotic Fracture 13.4% Hip Fracture                1.7% : -1.5  8/20: -1.6, FRAX: Major Osteoporotic Fracture: 13.3%                             Hip Fracture:                1.6%    H/O chest x-ray 05/18/2023    normal    H/O CT scan     8/20: CT calcium score=0 POTENTIAL BUT NOT DEFINITIVE 4 MM SUBPLEURAL LEFT LOWER LOBE NONCALCIFIED NODULE VERSUS ATELECTASIS/SCAR. ACCORDING TO 2017 REVISION LUNG NODULE FOLLOW-UP GUIDELINES, EVEN IF THIS IS A HIGH RISK PATIENT (SMOKING OR MALIGNANCY HISTORY), FOLLOW-UP CHEST CT IN ONE YEAR WOULD BE OPTIONAL    H/O CT scan of abdomen 08/25/2024    Colonic diverticulosis without findings of diverticulitis.  Question mild wall thickening of the colon versus underdistention.  Mildly thickened small bowel loops in the abdomen and pelvis c/w mild enterocoloitis. Mild hepatic steatosis.    H/O CT scan of chest     3/2021: 1. Stable pulmonary (3-4mm)nodules as above. Per patient risk factors, 12 month follow-up could be obtained as clinically indicated.Pulmonary hyperinflation with emphysematous changes and pleuroparenchymal scarring. Stable prominence of the main pancreatic duct. (4mm), compared to CT 2011. Subacute left-sided rib fractures with bony callus formation. Indeterminate hepatic lesions, 0.8-1cm.    H/O CT scan of chest 11/18/2023    Stable scattered pulmonary nodules measuring 4 mm or less in size. No new pulmonary nodule or mass    H/O diagnostic ultrasound     US RUQ: 10/21: 1. Right hepatic lobe lesion measuring up to 0.8 x 0.7cm x 0.7cm cm with imaging characteristics suggestive of a benign hemangioma. 2. Previously noted left hepatic lobe lesion is not visualized in the present examination. 3. Poor visualization of the pancreas.    H/O electromyography     H/O magnetic resonance imaging 09/26/2024    MRI abd:1.  Tubular 20 x 6 mm fluid signal intensity process pancreatic head favors a dilated pancreatic side duct branch potentially indicating site of branch IPMN. The adjacent pancreatic duct is estimated at about 3 mm. 2. Probably  benign liver and kidney cyst    H/O magnetic resonance imaging of lumbar spine 05/07/2024    Mild multilevel lumbar spondylosis and facet hypertrophy with no significant spinal canal stenosis and mild scattered neural foraminal narrowing.    H/O x-ray of lumbar spine 01/27/2024    Unremarkable radiographic evaluation of the lumbar spine.       Current Outpatient Medications:     ALPRAZolam (Xanax) 1 mg tablet, Take 1 tablet up to tid prn for anxiety, Disp: 21 tablet, Rfl: 0    biotin 1 mg capsule, Take 1 capsule (1 mg) by mouth once daily. Taking 10,000 mg, Disp: , Rfl:     cholecalciferol (Vitamin D-3) 25 MCG (1000 UT) tablet, Take 1 tablet (25 mcg) by mouth once daily., Disp: , Rfl:     levothyroxine (Synthroid, Levoxyl) 50 mcg tablet, TAKE 1 TABLET EVERY DAY, Disp: 90 tablet, Rfl: 3    loratadine (Claritin) 10 mg tablet, Take 1 tablet (10 mg) by mouth once daily., Disp: , Rfl:     meloxicam (Mobic) 15 mg tablet, TAKE 1 TABLET EVERY DAY, Disp: 90 tablet, Rfl: 3    multivitamin tablet, Take 1 tablet by mouth once daily., Disp: , Rfl:     pregabalin (Lyrica) 200 mg capsule, Take 1 capsule (200 mg) by mouth 3 times a day., Disp: , Rfl:     sertraline (Zoloft) 100 mg tablet, Take 1 tablet (100 mg) by mouth once daily., Disp: 90 tablet, Rfl: 3    simvastatin (Zocor) 40 mg tablet, TAKE 1 TABLET EVERY DAY (NEW DOSE), Disp: 90 tablet, Rfl: 3    vitamin E 180 mg (400 unit) capsule, 400-800 units daily, Disp: 30 capsule, Rfl: 11    DULoxetine (Cymbalta) 20 mg DR capsule, Take 2 capsules (40 mg) by mouth once daily for 14 days, THEN 1 capsule (20 mg) once daily. Do not crush or chew.., Disp: 58 capsule, Rfl: 0    hydrOXYzine HCL (Atarax) 25 mg tablet, Take 1 tablet (25 mg) by mouth every 6 hours if needed for anxiety., Disp: 120 tablet, Rfl: 0  No current facility-administered medications for this encounter.    Facility-Administered Medications Ordered in Other Encounters:     lactated Ringer's bolus, , intravenous,  Continuous PRN, Adam Morris, CAA, New Bag at 10/23/24 0712  Prior to Admission medications    Medication Sig Start Date End Date Taking? Authorizing Provider   ALPRAZolam (Xanax) 1 mg tablet Take 1 tablet up to tid prn for anxiety 9/26/24  Yes Kathryn Kang MD   biotin 1 mg capsule Take 1 capsule (1 mg) by mouth once daily. Taking 10,000 mg   Yes Historical Provider, MD   cholecalciferol (Vitamin D-3) 25 MCG (1000 UT) tablet Take 1 tablet (25 mcg) by mouth once daily. 6/25/20  Yes Historical Provider, MD   levothyroxine (Synthroid, Levoxyl) 50 mcg tablet TAKE 1 TABLET EVERY DAY 1/17/24  Yes Kathryn Kang MD   loratadine (Claritin) 10 mg tablet Take 1 tablet (10 mg) by mouth once daily.   Yes Historical Provider, MD   meloxicam (Mobic) 15 mg tablet TAKE 1 TABLET EVERY DAY 8/24/24  Yes Kathryn Kang MD   multivitamin tablet Take 1 tablet by mouth once daily.   Yes Historical Provider, MD   pregabalin (Lyrica) 200 mg capsule Take 1 capsule (200 mg) by mouth 3 times a day.   Yes Historical Provider, MD   sertraline (Zoloft) 100 mg tablet Take 1 tablet (100 mg) by mouth once daily. 10/17/24 10/17/25 Yes FADI Humphries   simvastatin (Zocor) 40 mg tablet TAKE 1 TABLET EVERY DAY (NEW DOSE) 1/17/24  Yes Kathryn Kang MD   vitamin E 180 mg (400 unit) capsule 400-800 units daily 4/7/23  Yes Kathryn Kang MD   DULoxetine (Cymbalta) 20 mg DR capsule Take 2 capsules (40 mg) by mouth once daily for 14 days, THEN 1 capsule (20 mg) once daily. Do not crush or chew.. 8/26/24 10/9/24  FADI Benavides   hydrOXYzine HCL (Atarax) 25 mg tablet Take 1 tablet (25 mg) by mouth every 6 hours if needed for anxiety. 8/24/24 10/17/24  Verna Ann,    sertraline (Zoloft) 50 mg tablet Take 1 tablet (50 mg) by mouth once daily. 9/26/24 10/17/24  Kathryn Kang MD     Allergies   Allergen Reactions    Fluoxetine Other     Years ago   Paranoia   (did well on Paxil for years though)    Niacin Hives     "Doxycycline Diarrhea     esophageal ulcer    Sulfa (Sulfonamide Antibiotics) Unknown     Sulfa containing compounds     Social History     Tobacco Use    Smoking status: Former     Current packs/day: 0.00     Types: Cigarettes     Quit date: 2016     Years since quittin.8    Smokeless tobacco: Never   Substance Use Topics    Alcohol use: Yes     Comment: socially         Chemistry    Lab Results   Component Value Date/Time     2024 0730    K 4.4 2024 0730     2024 0730    CO2 30 2024 0730    BUN 21 2024 0730    CREATININE 0.77 2024 0730    Lab Results   Component Value Date/Time    CALCIUM 9.4 2024 0730    ALKPHOS 73 2024 1406    AST 18 2024 1406    ALT 15 2024 1406    BILITOT 0.6 2024 1406          Lab Results   Component Value Date/Time    WBC 7.3 2024 1406    HGB 14.2 2024 1406    HCT 42.4 2024 1406     2024 1406     No results found for: \"PROTIME\", \"PTT\", \"INR\"  Encounter Date: 24   ECG 12 lead   Result Value    Ventricular Rate 72    Atrial Rate 72    RI Interval 106    QRS Duration 86    QT Interval 398    QTC Calculation(Bazett) 435    P Axis 46    R Axis 56    T Axis 43    QRS Count 11    Q Onset 222    P Onset 169    P Offset 211    T Offset 421    QTC Fredericia 423    Narrative    Sinus rhythm  Nonspecific T wave abnormality  No previous ECGs available  Confirmed by Josef Craven ([id number]) on 10/4/2024 4:16:52 PM        Anesthesia Plan    History of general anesthesia?: yes  History of complications of general anesthesia?: no    ASA 2     MAC     The patient is not a current smoker.    intravenous induction   Anesthetic plan and risks discussed with patient.    Plan discussed with CAA.      "

## 2024-10-23 NOTE — DISCHARGE INSTRUCTIONS

## 2024-10-23 NOTE — ANESTHESIA POSTPROCEDURE EVALUATION
Patient: Elean Bueno    Procedure Summary       Date: 10/23/24 Room / Location: [facility]    Anesthesia Start: 0743 Anesthesia Stop: 0816    Procedure: ENDOSCOPIC ULTRASOUND (UPPER) Diagnosis: Porphyria (Multi)    Scheduled Providers: Zaina Hubbard MD Responsible Provider: Rajinder Johnson MD    Anesthesia Type: MAC ASA Status: 2            Anesthesia Type: MAC    Vitals Value Taken Time   /57 10/23/24 0818   Temp 36.2 10/23/24 0818   Pulse 51 10/23/24 0818   Resp 12 10/23/24 0818   SpO2 96 10/23/24 0818       Anesthesia Post Evaluation    Patient location during evaluation: bedside  Patient participation: waiting for patient participation  Level of consciousness: responsive to verbal stimuli  Pain management: adequate  Airway patency: patent  Cardiovascular status: acceptable  Respiratory status: acceptable  Hydration status: acceptable  Postoperative Nausea and Vomiting: none      No notable events documented.

## 2024-10-28 ENCOUNTER — ANESTHESIA EVENT (OUTPATIENT)
Dept: GASTROENTEROLOGY | Facility: EXTERNAL LOCATION | Age: 69
End: 2024-10-28

## 2024-11-06 ENCOUNTER — APPOINTMENT (OUTPATIENT)
Dept: GASTROENTEROLOGY | Facility: EXTERNAL LOCATION | Age: 69
End: 2024-11-06
Payer: MEDICARE

## 2024-11-06 ENCOUNTER — ANESTHESIA (OUTPATIENT)
Dept: GASTROENTEROLOGY | Facility: EXTERNAL LOCATION | Age: 69
End: 2024-11-06

## 2024-11-06 VITALS
TEMPERATURE: 97.5 F | SYSTOLIC BLOOD PRESSURE: 123 MMHG | HEART RATE: 60 BPM | OXYGEN SATURATION: 98 % | DIASTOLIC BLOOD PRESSURE: 72 MMHG | RESPIRATION RATE: 12 BRPM

## 2024-11-06 DIAGNOSIS — Z12.11 COLON CANCER SCREENING: ICD-10-CM

## 2024-11-06 DIAGNOSIS — R19.7 DIARRHEA, UNSPECIFIED TYPE: ICD-10-CM

## 2024-11-06 PROCEDURE — 45385 COLONOSCOPY W/LESION REMOVAL: CPT | Performed by: INTERNAL MEDICINE

## 2024-11-06 PROCEDURE — 45380 COLONOSCOPY AND BIOPSY: CPT | Performed by: INTERNAL MEDICINE

## 2024-11-06 PROCEDURE — 88305 TISSUE EXAM BY PATHOLOGIST: CPT | Mod: TC,ELYLAB | Performed by: INTERNAL MEDICINE

## 2024-11-06 RX ORDER — SODIUM CHLORIDE 9 MG/ML
INJECTION, SOLUTION INTRAVENOUS CONTINUOUS PRN
Status: DISCONTINUED | OUTPATIENT
Start: 2024-11-06 | End: 2024-11-06

## 2024-11-06 RX ORDER — PROPOFOL 10 MG/ML
INJECTION, EMULSION INTRAVENOUS AS NEEDED
Status: DISCONTINUED | OUTPATIENT
Start: 2024-11-06 | End: 2024-11-06

## 2024-11-06 RX ORDER — ONDANSETRON HYDROCHLORIDE 2 MG/ML
4 INJECTION, SOLUTION INTRAVENOUS ONCE AS NEEDED
Status: DISCONTINUED | OUTPATIENT
Start: 2024-11-06 | End: 2024-11-07 | Stop reason: HOSPADM

## 2024-11-06 RX ORDER — LIDOCAINE HYDROCHLORIDE 20 MG/ML
INJECTION, SOLUTION INFILTRATION; PERINEURAL AS NEEDED
Status: DISCONTINUED | OUTPATIENT
Start: 2024-11-06 | End: 2024-11-06

## 2024-11-06 SDOH — HEALTH STABILITY: MENTAL HEALTH: CURRENT SMOKER: 0

## 2024-11-06 ASSESSMENT — PAIN SCALES - GENERAL
PAINLEVEL_OUTOF10: 0 - NO PAIN
PAINLEVEL_OUTOF10: 0 - NO PAIN
PAIN_LEVEL: 0
PAINLEVEL_OUTOF10: 0 - NO PAIN
PAINLEVEL_OUTOF10: 0 - NO PAIN

## 2024-11-06 ASSESSMENT — PAIN - FUNCTIONAL ASSESSMENT
PAIN_FUNCTIONAL_ASSESSMENT: 0-10

## 2024-11-06 ASSESSMENT — COLUMBIA-SUICIDE SEVERITY RATING SCALE - C-SSRS
1. IN THE PAST MONTH, HAVE YOU WISHED YOU WERE DEAD OR WISHED YOU COULD GO TO SLEEP AND NOT WAKE UP?: NO
6. HAVE YOU EVER DONE ANYTHING, STARTED TO DO ANYTHING, OR PREPARED TO DO ANYTHING TO END YOUR LIFE?: NO
2. HAVE YOU ACTUALLY HAD ANY THOUGHTS OF KILLING YOURSELF?: NO

## 2024-11-06 NOTE — H&P
Outpatient Hospital Procedure    Patient Profile-Procedures  Initial Info  Patient Demographics  Name Elena Bueno  Date of Birth 1955  MRN [id number]  Address   [address]  [place] [id number] [address]  [place] [id number]    Primary Phone Number [phone]  Secondary Phone Number    Kathryn Sales    Procedures   Colonoscopy      Indication:  Screening - incidental diarrhea intermittent    Primary contact name and number   Extended Emergency Contact Information  Primary Emergency Contact: Asha Rodas  Home Phone: [phone]  Relation: Child    General Health  Weight There were no vitals filed for this visit.  BMI There is no height or weight on file to calculate BMI.    Allergies  Allergies   Allergen Reactions    Fluoxetine Other     Years ago   Paranoia   (did well on Paxil for years though)    Niacin Hives    Doxycycline Diarrhea     esophageal ulcer    Sulfa (Sulfonamide Antibiotics) Unknown     Sulfa containing compounds       Past Medical History   Past Medical History:   Diagnosis Date    Abnormal ultrasound of abdomen 11/15/2022    Comment on above: 10/21: 1. Right hepatic lobe lesion measuring up to 0.8 x 0.7cm x 0.7cm cm with imaging characteristics suggestive of a benign hemangioma.2. Previously noted left hepatic lobe lesion is not visualized in the present examination.3. Poor visualization of the pancreas.;    Anxiety     Depression     H/O bone density study     11/16/22: Lowest T score -1.5 10-Year Fracture Risk: Major Osteoporotic Fracture 13.4% Hip Fracture                1.7% 7/16: -1.5 8/20: -1.6, FRAX: Major Osteoporotic Fracture: 13.3%                             Hip Fracture:                1.6%    H/O chest x-ray 05/18/2023    normal    H/O CT scan     8/20: CT calcium score=0 POTENTIAL BUT NOT DEFINITIVE 4 MM SUBPLEURAL LEFT LOWER LOBE NONCALCIFIED NODULE VERSUS ATELECTASIS/SCAR. ACCORDING TO 2017 REVISION LUNG NODULE FOLLOW-UP GUIDELINES, EVEN IF THIS IS A  HIGH RISK PATIENT (SMOKING OR MALIGNANCY HISTORY), FOLLOW-UP CHEST CT IN ONE YEAR WOULD BE OPTIONAL    H/O CT scan of abdomen 08/25/2024    Colonic diverticulosis without findings of diverticulitis.  Question mild wall thickening of the colon versus underdistention.  Mildly thickened small bowel loops in the abdomen and pelvis c/w mild enterocoloitis. Mild hepatic steatosis.    H/O CT scan of chest     3/2021: 1. Stable pulmonary (3-4mm)nodules as above. Per patient risk factors, 12 month follow-up could be obtained as clinically indicated.Pulmonary hyperinflation with emphysematous changes and pleuroparenchymal scarring. Stable prominence of the main pancreatic duct. (4mm), compared to CT 2011. Subacute left-sided rib fractures with bony callus formation. Indeterminate hepatic lesions, 0.8-1cm.    H/O CT scan of chest 11/18/2023    Stable scattered pulmonary nodules measuring 4 mm or less in size. No new pulmonary nodule or mass    H/O diagnostic ultrasound     US RUQ: 10/21: 1. Right hepatic lobe lesion measuring up to 0.8 x 0.7cm x 0.7cm cm with imaging characteristics suggestive of a benign hemangioma. 2. Previously noted left hepatic lobe lesion is not visualized in the present examination. 3. Poor visualization of the pancreas.    H/O electromyography     H/O magnetic resonance imaging 09/26/2024    MRI abd:1.  Tubular 20 x 6 mm fluid signal intensity process pancreatic head favors a dilated pancreatic side duct branch potentially indicating site of branch IPMN. The adjacent pancreatic duct is estimated at about 3 mm. 2. Probably benign liver and kidney cyst    H/O magnetic resonance imaging of lumbar spine 05/07/2024    Mild multilevel lumbar spondylosis and facet hypertrophy with no significant spinal canal stenosis and mild scattered neural foraminal narrowing.    H/O x-ray of lumbar spine 01/27/2024    Unremarkable radiographic evaluation of the lumbar spine.    Hyperlipidemia     Hypothyroid         Provider assessment  Diagnosis  Medication Reviewed - yes  Prior to Admission medications    Medication Sig Start Date End Date Taking? Authorizing Provider   ALPRAZolam (Xanax) 1 mg tablet Take 1 tablet up to tid prn for anxiety 9/26/24  Yes Kathryn Kang MD   biotin 1 mg capsule Take 1 capsule (1 mg) by mouth once daily. Taking 10,000 mg   Yes Historical Provider, MD   cholecalciferol (Vitamin D-3) 25 MCG (1000 UT) tablet Take 1 tablet (25 mcg) by mouth once daily. 6/25/20  Yes Historical Provider, MD   levothyroxine (Synthroid, Levoxyl) 50 mcg tablet TAKE 1 TABLET EVERY DAY 1/17/24  Yes Kathryn Kang MD   loratadine (Claritin) 10 mg tablet Take 1 tablet (10 mg) by mouth once daily.   Yes Historical Provider, MD   meloxicam (Mobic) 15 mg tablet TAKE 1 TABLET EVERY DAY 8/24/24  Yes Kathryn Kang MD   multivitamin tablet Take 1 tablet by mouth once daily.   Yes Historical Provider, MD   pregabalin (Lyrica) 200 mg capsule Take 1 capsule (200 mg) by mouth 3 times a day.   Yes Historical Provider, MD   sertraline (Zoloft) 100 mg tablet Take 1 tablet (100 mg) by mouth once daily. 10/17/24 10/17/25 Yes FADI Humphries   simvastatin (Zocor) 40 mg tablet TAKE 1 TABLET EVERY DAY (NEW DOSE) 1/17/24  Yes Kathryn Kang MD   vitamin E 180 mg (400 unit) capsule 400-800 units daily 4/7/23  Yes Kathryn Kang MD   DULoxetine (Cymbalta) 20 mg DR capsule Take 2 capsules (40 mg) by mouth once daily for 14 days, THEN 1 capsule (20 mg) once daily. Do not crush or chew.. 8/26/24 10/9/24  FADI Benavides   hydrOXYzine HCL (Atarax) 25 mg tablet Take 1 tablet (25 mg) by mouth every 6 hours if needed for anxiety. 8/24/24 10/17/24  Verna Ann, DO       This is my H&P    Physical Exam  Physical Exam  Constitutional:       Comments: Awake   HENT:      Head: Normocephalic.   Cardiovascular:      Rate and Rhythm: Normal rate and regular rhythm.   Pulmonary:      Effort: Pulmonary effort is normal.       Breath sounds: Normal breath sounds.   Abdominal:      General: Bowel sounds are normal.      Palpations: Abdomen is soft.   Neurological:      Mental Status: She is alert.   Psychiatric:         Mood and Affect: Mood normal.           Oropharyngeal Classification II (hard and soft palate, upper portion of tonsils and uvula visible)  ASA PS Classification 2  Sedation Plan Deep  Procedure Plan - pre-procedural (re)assesment completed by physician:  discharge/transfer patient when discharge criteria met    Carolina Dahl MD  11/6/2024 2:08 PM

## 2024-11-06 NOTE — ANESTHESIA PREPROCEDURE EVALUATION
Patient: Elena Bueno    Procedure Information       Date/Time: 11/06/24 1350    Scheduled providers: Carolina BUI MD    Procedure: COLONOSCOPY    Location: [facility]            Relevant Problems   Cardiac   (+) Hyperlipidemia, mixed      Neuro   (+) Anxiety   (+) Current moderate episode of major depressive disorder without prior episode (Multi)   (+) Peripheral neuropathy      GI   (+) Esophageal ulcer      Liver   (+) Fatty liver   (+) Hepatic cyst   (+) Hepatic hemangioma      Endocrine   (+) Hypothyroidism, adult      Musculoskeletal   (+) Lumbar spondylosis       Clinical information reviewed:   Tobacco  Allergies  Meds   Med Hx  Surg Hx  OB Status  Fam Hx  Soc   Hx        NPO Detail:  NPO/Void Status  Date of Last Liquid: 11/06/24  Time of Last Liquid: 0915  Date of Last Solid: 11/04/24  Last Intake Type: Clear fluids         Physical Exam    Airway  Mallampati: II  TM distance: >3 FB  Neck ROM: full     Cardiovascular - normal exam  Rhythm: regular  Rate: normal     Dental - normal exam     Pulmonary - normal exam  Breath sounds clear to auscultation     Abdominal - normal exam         Anesthesia Plan    History of general anesthesia?: yes  History of complications of general anesthesia?: no    ASA 2     MAC     The patient is not a current smoker.    intravenous induction   Anesthetic plan and risks discussed with patient.    Plan discussed with CRNA.

## 2024-11-06 NOTE — DISCHARGE INSTRUCTIONS
Patient Instructions Post Endoscopy Procedure      The anesthetics, sedatives or narcotics which were given to you today will be acting in your body for the next 24 hours, so you might feel a little sleepy or groggy.  This feeling should slowly wear off. Carefully read and follow the instructions.     You received sedation today:  - Do not drive or operate any machinery or power tools of any kind.   - No alcoholic beverages today, not even beer or wine.  - Do not make any important decisions or sign any legal documents.  - No over the counter medications that contain alcohol or that may cause drowsiness.    While it is common to experience mild to moderate abdominal distention, gas, or belching after your procedure, if any of these symptoms occur following discharge from the GI Lab or within one week of having your procedure, call the [facility] to be advised whether a visit to your nearest Urgent Care or Emergency Department is indicated.  Take this paper with you if you go.   - If you develop an allergic reaction to the medications that were given during your procedure such as difficulty breathing, rash, hives, severe nausea, vomiting or lightheadedness.  - If you experience chest pain, shortness of breath, severe abdominal pain, fevers and chills.  -If you develop signs and symptoms of bleeding such as blood in your spit, if your stools turn black, tarry, or bloody  - If you have not urinated within 8 hours following your procedure.  - If your IV site becomes painful, red, inflamed, or looks infected.    If you received a biopsy/polypectomy the following instructions apply below:  _x_ Do not use non-steroidal medications or anti-coagulants for one week following your procedure. (Examples of these types of medications are: Advil, Arthrotec, Aleve, Coumadin, Ecotrin, Heparin, Ibuprofen, Indocin, Motrin, Naprosyn, Nuprin, Plavix, Vioxx, and Voltarin, or their generic forms.  This list is not  all-inclusive.  Check with your physician or pharmacist before resuming medications.)   _x_ Eat a soft diet today.  Avoid foods that are poorly digested for the next 24 hours.  These foods would include: nuts, beans, lettuce, red meats, and fried foods. Start with liquids and advance your diet as tolerated, gradually work up to eating solids.   __ You can restart your ASA tomorrow  __ You can resume your anticoagulation therapy -     Your physician recommends the additional following instructions:    -You have a contact number available for emergencies. The signs and symptoms of potential delayed complications were discussed with you. You may return to normal activities tomorrow.  -Resume your previous diet or other if specified.  -Continue your present medications.   -We are waiting for your pathology results, if applicable. The results will be available in [facility]. I will send you a message with any recommendations.  -The findings and recommendations have been discussed with you and/or family.  -Please see Medication Reconciliation Form for new medication/medications prescribed.     If you experience any problems or have any questions following discharge from the GI Lab, please call: [phone] from 7 am- 4:30 pm.  In the event of an emergency please go to the closest Emergency Department or call Dr. Dahl at [phone]

## 2024-11-06 NOTE — ANESTHESIA POSTPROCEDURE EVALUATION
Patient: Elena Bueno    Procedure Summary       Date: 11/06/24 Room / Location: [facility]    Anesthesia Start: 1407 Anesthesia Stop: 1431    Procedure: COLONOSCOPY Diagnosis:       Diarrhea, unspecified type      Colon cancer screening    Scheduled Providers: Carolina BUI MD Responsible Provider: MELVIN Duran    Anesthesia Type: MAC ASA Status: 2            Anesthesia Type: MAC    Vitals Value Taken Time   /73 11/06/24 1430   Temp 36.4 °C (97.5 °F) 11/06/24 1430   Pulse 64 11/06/24 1430   Resp 12 11/06/24 1430   SpO2 98 % 11/06/24 1430       Anesthesia Post Evaluation    Patient location during evaluation: bedside  Patient participation: complete - patient participated  Level of consciousness: awake  Pain score: 0  Pain management: adequate  Airway patency: patent  Cardiovascular status: acceptable  Respiratory status: acceptable  Hydration status: acceptable  Postoperative Nausea and Vomiting: none    There were no known notable events for this encounter.

## 2024-11-07 DIAGNOSIS — E78.2 HYPERLIPIDEMIA, MIXED: ICD-10-CM

## 2024-11-07 DIAGNOSIS — E03.9 HYPOTHYROIDISM, ADULT: ICD-10-CM

## 2024-11-07 RX ORDER — LEVOTHYROXINE SODIUM 50 UG/1
50 TABLET ORAL DAILY
Qty: 90 TABLET | Refills: 3 | Status: SHIPPED | OUTPATIENT
Start: 2024-11-07

## 2024-11-07 RX ORDER — SIMVASTATIN 40 MG/1
40 TABLET, FILM COATED ORAL DAILY
Qty: 90 TABLET | Refills: 3 | Status: SHIPPED | OUTPATIENT
Start: 2024-11-07

## 2024-11-13 PROBLEM — Z86.0100 HISTORY OF COLON POLYPS: Status: ACTIVE | Noted: 2024-11-13

## 2024-11-13 LAB
LABORATORY COMMENT REPORT: NORMAL
PATH REPORT.FINAL DX SPEC: NORMAL
PATH REPORT.GROSS SPEC: NORMAL
PATH REPORT.RELEVANT HX SPEC: NORMAL
PATH REPORT.TOTAL CANCER: NORMAL

## 2024-11-19 ENCOUNTER — HOSPITAL ENCOUNTER (OUTPATIENT)
Dept: RADIOLOGY | Facility: CLINIC | Age: 69
Discharge: HOME | End: 2024-11-19
Payer: MEDICARE

## 2024-11-19 ENCOUNTER — APPOINTMENT (OUTPATIENT)
Dept: RADIOLOGY | Facility: CLINIC | Age: 69
End: 2024-11-19
Payer: MEDICARE

## 2024-11-19 DIAGNOSIS — Z78.0 MENOPAUSE: ICD-10-CM

## 2024-11-19 PROCEDURE — 77080 DXA BONE DENSITY AXIAL: CPT | Performed by: RADIOLOGY

## 2024-11-19 PROCEDURE — 77080 DXA BONE DENSITY AXIAL: CPT

## 2024-11-20 ENCOUNTER — HOSPITAL ENCOUNTER (OUTPATIENT)
Dept: RADIOLOGY | Facility: HOSPITAL | Age: 69
Discharge: HOME | End: 2024-11-20
Payer: MEDICARE

## 2024-11-20 DIAGNOSIS — R19.7 DIARRHEA, UNSPECIFIED TYPE: ICD-10-CM

## 2024-11-20 PROBLEM — K22.4 ESOPHAGEAL DYSMOTILITY: Status: ACTIVE | Noted: 2024-11-20

## 2024-11-20 PROCEDURE — A9698 NON-RAD CONTRAST MATERIALNOC: HCPCS | Performed by: INTERNAL MEDICINE

## 2024-11-20 PROCEDURE — 74248 X-RAY SM INT F-THRU STD: CPT

## 2024-11-20 PROCEDURE — 74248 X-RAY SM INT F-THRU STD: CPT | Performed by: RADIOLOGY

## 2024-11-20 PROCEDURE — 2500000001 HC RX 250 WO HCPCS SELF ADMINISTERED DRUGS (ALT 637 FOR MEDICARE OP): Performed by: INTERNAL MEDICINE

## 2024-11-20 PROCEDURE — 74246 X-RAY XM UPR GI TRC 2CNTRST: CPT | Performed by: RADIOLOGY

## 2024-11-20 PROCEDURE — 2500000005 HC RX 250 GENERAL PHARMACY W/O HCPCS: Performed by: INTERNAL MEDICINE

## 2024-12-03 ENCOUNTER — APPOINTMENT (OUTPATIENT)
Dept: GASTROENTEROLOGY | Facility: CLINIC | Age: 69
End: 2024-12-03
Payer: MEDICARE

## 2024-12-03 DIAGNOSIS — R19.8 ALTERNATING CONSTIPATION AND DIARRHEA: Primary | ICD-10-CM

## 2024-12-03 PROCEDURE — 99213 OFFICE O/P EST LOW 20 MIN: CPT | Performed by: NURSE PRACTITIONER

## 2024-12-03 PROCEDURE — 1123F ACP DISCUSS/DSCN MKR DOCD: CPT | Performed by: NURSE PRACTITIONER

## 2024-12-03 NOTE — PROGRESS NOTES
Subjective   Patient ID: Elena Bueno is a 69 y.o. female who presents for No chief complaint on file..  [facility] 9/3/24:  69-year-old female presents for evaluation of intermittent diarrhea.  These episodes have occurred approximately once a month for the past 6 months.  She reports she takes care of her grandchildren during the day she will have some fatigue that will progress to nausea and diarrhea associated with anxiety.  She is unable to identify when the anxiety occurs either before or after the diarrhea.  She will multiple episodes of loose to watery stools without any blood or mucus.  She denies any changes in her urine color.  Her symptoms will resolve within 24 hours and she will go back to her baseline of regular solid bowel movements daily.  She denies any other associated extraintestinal manifestations or alarm symptoms.  She has never had a colonoscopy.  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  She denied any associated abdominal pain with these episodes.  I would recommend a colonoscopy both for screening as well as for random biopsies to rule out microscopic colitis.  She will continue to follow-up with her primary care physician regarding the porphyria workup.  I would like her to document her overall oral intake the day these episodes occur considering potential dairy products or high-fat meals that might contribute to her symptoms.  She will follow-up in our office in 3 months      Follow up [facility] 12/3/24:  Patient following up after undergoing a colonoscopy.  Patient now with constipation with alternating diarrhea.  She does not drink enough water throughout the day.  She does consume a significant amount of dairy throughout the day.  Weight and appetite are stable.  No melena or hematochezia.  No abdominal pain.  No  other GI complaints at this time.      ->Colonoscopy polyps positive for tubular adenoma, repeat in 5 years for surveillance.  Random colon biopsies were normal.  Scattered diverticulosis of mild severity in the sigmoid colon  Hemorrhoids  Normal. Performed random biopsy using biopsy forceps.  3 subcentimeter polyps were removed     Virtual or Telephone Consent    An interactive audio and video telecommunication system which permits real time communications between the patient (at the originating site) and provider (at the distant site) was utilized to provide this telehealth service.   Verbal consent was requested and obtained from Elena Bueno on this date, 12/03/24 for a telehealth visit.    Review of Systems   All other systems reviewed and are negative.      Objective   Physical Exam  Constitutional:       General: She is awake. She is not in acute distress.     Appearance: Normal appearance. She is well-developed. She is not ill-appearing, toxic-appearing or diaphoretic.   HENT:      Head: Normocephalic and atraumatic.   Eyes:      General: No scleral icterus.     Pupils: Pupils are equal, round, and reactive to light.   Pulmonary:      Effort: Pulmonary effort is normal. No respiratory distress.   Musculoskeletal:         General: Normal range of motion.   Skin:     Coloration: Skin is not cyanotic, jaundiced or pale.   Neurological:      General: No focal deficit present.      Mental Status: She is alert and oriented to person, place, and time.   Psychiatric:         Behavior: Behavior is cooperative.         Assessment/Plan   Diagnoses and all orders for this visit:  Alternating constipation and diarrhea  Very pleasant 69-year-old female following up after undergoing a colonoscopy with complaints of diarrhea.  Colonoscopy positive for tubular adenoma, will repeat in 5 years for surveillance.  Now with alternating constipation and diarrhea.  Her symptoms are likely secondary to not drinking enough water and  consuming a significant amount of dairy throughout the day.  No red flag symptoms noted.    -Avoid dairy  -Drink more water throughout the day  -Add Metamucil to help with BM regularity  -Use a footstool or squatty potty during bowel movements  -Follow-up in 12 weeks to assess response         ADITHYA Perry-CNP 12/03/24 8:03 AM

## 2024-12-03 NOTE — PATIENT INSTRUCTIONS
It was nice talking to you this morning!   -Please avoid dairy.  -Add Metamucil 1 teaspoon daily in 6 to 8 ounces of water  -Try to drink more water throughout the day, the goal is 64 ounces in 24 hours  -Squatty potty during bowel movements  -See me in 12 weeks

## 2024-12-04 ENCOUNTER — TELEPHONE (OUTPATIENT)
Dept: GASTROENTEROLOGY | Facility: CLINIC | Age: 69
End: 2024-12-04
Payer: MEDICARE

## 2024-12-09 ENCOUNTER — OFFICE VISIT (OUTPATIENT)
Dept: PRIMARY CARE | Facility: CLINIC | Age: 69
End: 2024-12-09
Payer: MEDICARE

## 2024-12-09 ENCOUNTER — HOSPITAL ENCOUNTER (OUTPATIENT)
Dept: RADIOLOGY | Facility: CLINIC | Age: 69
Discharge: HOME | End: 2024-12-09
Payer: MEDICARE

## 2024-12-09 VITALS
WEIGHT: 129.2 LBS | DIASTOLIC BLOOD PRESSURE: 75 MMHG | BODY MASS INDEX: 23.63 KG/M2 | SYSTOLIC BLOOD PRESSURE: 133 MMHG | OXYGEN SATURATION: 99 % | TEMPERATURE: 98.1 F | HEART RATE: 74 BPM

## 2024-12-09 DIAGNOSIS — J06.9 UPPER RESPIRATORY TRACT INFECTION, UNSPECIFIED TYPE: Primary | ICD-10-CM

## 2024-12-09 DIAGNOSIS — R06.00 DYSPNEA AND RESPIRATORY ABNORMALITIES: ICD-10-CM

## 2024-12-09 DIAGNOSIS — R06.89 DYSPNEA AND RESPIRATORY ABNORMALITIES: ICD-10-CM

## 2024-12-09 DIAGNOSIS — F41.9 ANXIETY: ICD-10-CM

## 2024-12-09 DIAGNOSIS — Z79.899 MEDICATION MANAGEMENT: ICD-10-CM

## 2024-12-09 DIAGNOSIS — G47.9 SLEEP DISTURBANCE: ICD-10-CM

## 2024-12-09 DIAGNOSIS — J06.9 UPPER RESPIRATORY TRACT INFECTION, UNSPECIFIED TYPE: ICD-10-CM

## 2024-12-09 PROCEDURE — 1160F RVW MEDS BY RX/DR IN RCRD: CPT | Performed by: NURSE PRACTITIONER

## 2024-12-09 PROCEDURE — 82570 ASSAY OF URINE CREATININE: CPT

## 2024-12-09 PROCEDURE — 71046 X-RAY EXAM CHEST 2 VIEWS: CPT

## 2024-12-09 PROCEDURE — G2211 COMPLEX E/M VISIT ADD ON: HCPCS | Performed by: NURSE PRACTITIONER

## 2024-12-09 PROCEDURE — 1123F ACP DISCUSS/DSCN MKR DOCD: CPT | Performed by: NURSE PRACTITIONER

## 2024-12-09 PROCEDURE — 71046 X-RAY EXAM CHEST 2 VIEWS: CPT | Performed by: STUDENT IN AN ORGANIZED HEALTH CARE EDUCATION/TRAINING PROGRAM

## 2024-12-09 PROCEDURE — 80307 DRUG TEST PRSMV CHEM ANLYZR: CPT

## 2024-12-09 PROCEDURE — 87637 SARSCOV2&INF A&B&RSV AMP PRB: CPT

## 2024-12-09 PROCEDURE — 80346 BENZODIAZEPINES1-12: CPT

## 2024-12-09 PROCEDURE — 80358 DRUG SCREENING METHADONE: CPT

## 2024-12-09 PROCEDURE — 99214 OFFICE O/P EST MOD 30 MIN: CPT | Performed by: NURSE PRACTITIONER

## 2024-12-09 PROCEDURE — 80365 DRUG SCREENING OXYCODONE: CPT

## 2024-12-09 PROCEDURE — 80373 DRUG SCREENING TRAMADOL: CPT

## 2024-12-09 PROCEDURE — 80354 DRUG SCREENING FENTANYL: CPT

## 2024-12-09 PROCEDURE — 1159F MED LIST DOCD IN RCRD: CPT | Performed by: NURSE PRACTITIONER

## 2024-12-09 PROCEDURE — 1036F TOBACCO NON-USER: CPT | Performed by: NURSE PRACTITIONER

## 2024-12-09 PROCEDURE — 80368 SEDATIVE HYPNOTICS: CPT

## 2024-12-09 PROCEDURE — 93000 ELECTROCARDIOGRAM COMPLETE: CPT | Performed by: NURSE PRACTITIONER

## 2024-12-09 PROCEDURE — 80361 OPIATES 1 OR MORE: CPT

## 2024-12-09 RX ORDER — TRAZODONE HYDROCHLORIDE 50 MG/1
50 TABLET ORAL NIGHTLY PRN
Qty: 90 TABLET | Refills: 3 | Status: SHIPPED | OUTPATIENT
Start: 2024-12-09 | End: 2025-12-09

## 2024-12-09 RX ORDER — AZITHROMYCIN 250 MG/1
TABLET, FILM COATED ORAL
Qty: 6 TABLET | Refills: 0 | Status: SHIPPED | OUTPATIENT
Start: 2024-12-09 | End: 2024-12-14

## 2024-12-09 RX ORDER — PREDNISONE 20 MG/1
40 TABLET ORAL DAILY
Qty: 10 TABLET | Refills: 0 | Status: SHIPPED | OUTPATIENT
Start: 2024-12-09 | End: 2024-12-14

## 2024-12-09 RX ORDER — ALPRAZOLAM 1 MG/1
TABLET ORAL
Qty: 21 TABLET | Refills: 0 | Status: SHIPPED | OUTPATIENT
Start: 2024-12-09

## 2024-12-09 ASSESSMENT — ANXIETY QUESTIONNAIRES
3. WORRYING TOO MUCH ABOUT DIFFERENT THINGS: SEVERAL DAYS
1. FEELING NERVOUS, ANXIOUS, OR ON EDGE: SEVERAL DAYS
7. FEELING AFRAID AS IF SOMETHING AWFUL MIGHT HAPPEN: SEVERAL DAYS
6. BECOMING EASILY ANNOYED OR IRRITABLE: SEVERAL DAYS
4. TROUBLE RELAXING: NOT AT ALL
2. NOT BEING ABLE TO STOP OR CONTROL WORRYING: MORE THAN HALF THE DAYS
5. BEING SO RESTLESS THAT IT IS HARD TO SIT STILL: NOT AT ALL
GAD7 TOTAL SCORE: 6
IF YOU CHECKED OFF ANY PROBLEMS ON THIS QUESTIONNAIRE, HOW DIFFICULT HAVE THESE PROBLEMS MADE IT FOR YOU TO DO YOUR WORK, TAKE CARE OF THINGS AT HOME, OR GET ALONG WITH OTHER PEOPLE: NOT DIFFICULT AT ALL

## 2024-12-09 NOTE — PROGRESS NOTES
Subjective   Patient ID: Elena Bueno is a 69 y.o. female who presents for Sick Visit.    Tuesday ST  Wednesday ST resolved, but had extreme GI upset  Started metamucil that day and believes it caused the pain  Stopped metamucil and pains resolved  Then started getting waves of nausea x2 days  Yesterday started with cough  Today with laryngitis   Central chest pain, migrated to back, now to under left breast  Hurts to take a deep breath, since resolved  Cough, laryngitis, some chest tightness    Issues sleeping  Last month  3am and feels wide awake  Taking xanax to get a few more hours of sleep          Review of Systems  ROS completely negative except what was mentioned in the HPI.  Problem List, surgical, social, and family histories which were reviewed and updated as necessary within the EMR. I also personally reviewed the notes, labs, and imaging that pertained to what was documented or discussed in the HPI.    Objective   Physical Exam  Vitals and nursing note reviewed.   Constitutional:       General: She is not in acute distress.     Appearance: Normal appearance.   HENT:      Head: Normocephalic and atraumatic.      Right Ear: Tympanic membrane, ear canal and external ear normal.      Left Ear: Tympanic membrane, ear canal and external ear normal.      Nose: Nose normal.      Mouth/Throat:      Mouth: Mucous membranes are moist.      Pharynx: Oropharynx is clear.   Eyes:      Extraocular Movements: Extraocular movements intact.      Conjunctiva/sclera: Conjunctivae normal.      Pupils: Pupils are equal, round, and reactive to light.   Cardiovascular:      Rate and Rhythm: Normal rate and regular rhythm.      Heart sounds: Normal heart sounds.   Pulmonary:      Effort: Pulmonary effort is normal.      Breath sounds: Normal breath sounds.   Musculoskeletal:         General: Normal range of motion.      Cervical back: Normal range of motion and neck supple.   Lymphadenopathy:      Cervical: No cervical  adenopathy.   Skin:     General: Skin is warm and dry.   Neurological:      General: No focal deficit present.      Mental Status: She is alert and oriented to person, place, and time. Mental status is at baseline.   Psychiatric:         Mood and Affect: Mood normal.         Behavior: Behavior normal.         Thought Content: Thought content normal.         Judgment: Judgment normal.         /75   Pulse 74   Temp 36.7 °C (98.1 °F) (Temporal)   Wt 58.6 kg (129 lb 3.2 oz)   LMP  (LMP Unknown)   SpO2 99%   BMI 23.63 kg/m²     Assessment/Plan    Problem List Items Addressed This Visit       Anxiety    Overview     S/p SSRI, Buspar and klonopin  8/21/24: anxiety provoked with unexplained medical symptoms; 7 day xanax RX  10/17/24: increase zoloft from 50 mg every day to 100 mg every day. monitor         Current Assessment & Plan     Intermittently takes xanax for sleep and anxiety  CSV completed today so she can have refill  Will trial trazodone to help with sleep         Relevant Medications    ALPRAZolam (Xanax) 1 mg tablet    Other Relevant Orders    Opiate/Opioid/Benzo Prescription Compliance    OOB Internal Tracking    Medication management    Current Assessment & Plan     CSA, CSV, UDS completed today         Relevant Orders    Opiate/Opioid/Benzo Prescription Compliance    OOB Internal Tracking    Sleep disturbance    Current Assessment & Plan     Trial trazodone over xanax  CSV completed today         Relevant Medications    traZODone (Desyrel) 50 mg tablet     Other Visit Diagnoses       Upper respiratory tract infection, unspecified type    -  Primary    Day 2 vs day 7?  Discussed CXR and steroid.  Symptomatic care, Azith if not improving.  PCR swabs sent    Relevant Medications    azithromycin (Zithromax) 250 mg tablet    predniSONE (Deltasone) 20 mg tablet    Other Relevant Orders    ECG 12 lead (Clinic Performed) (Completed)    XR chest 2 views    Sars-CoV-2 and Influenza A/B PCR    RSV PCR     Dyspnea and respiratory abnormalities        Relevant Medications    predniSONE (Deltasone) 20 mg tablet    Other Relevant Orders    ECG 12 lead (Clinic Performed) (Completed)    XR chest 2 views    Sars-CoV-2 and Influenza A/B PCR    RSV PCR           OARRS:  No data recorded  I have personally reviewed the OARRS report for Elena Bueno. I have considered the risks of abuse, dependence, addiction and diversion and I believe that it is clinically appropriate for Elena Bueno to be prescribed this medication    Is the patient prescribed a combination of a benzodiazepine and opioid?  No    Last Urine Drug Screen / ordered today: Yes  No results found for this or any previous visit (from the past 8760 hours).  N/A    Clinical rationale for not completing a Urine Drug Screen: completed today      Controlled Substance Agreement:  Date of the Last Agreement: 24  Reviewed Controlled Substance Agreement including but not limited to the benefits, risks, and alternatives to treatment with a Controlled Substance medication(s).    Benzodiazepines:  What is the patient's goal of therapy? Sleep and anxiety relief  Is this being achieved with current treatment? yes    TANYA-7:  Over the last 2 weeks, how often have you been bothered by any of the following problems?  Feeling nervous, anxious, or on edge: 1  Not being able to stop or control worryin  Worrying too much about different things: 1  Trouble relaxin  Being so restless that it is hard to sit still: 0  Becoming easily annoyed or irritable: 1  Feeling afraid as if something awful might happen: 1  TANYA-7 Total Score: 6        Activities of Daily Living:   Is your overall impression that this patient is benefiting (symptom reduction outweighs side effects) from benzodiazepine therapy? Yes     1. Physical Functioning: Better  2. Family Relationship: Better  3. Social Relationship: Better  4. Mood: Better  5. Sleep Patterns: Better  6. Overall Function: Better   15:01

## 2024-12-09 NOTE — ASSESSMENT & PLAN NOTE
NOTIFICATION RETURN TO WORK / SCHOOL 
 
3/23/2020 12:40 PM 
 
Ms. Antonella Noyola [facility] [id number] To Whom It May Concern: 
 
Antonella Noyola is currently under the care of [id number] [facility] EMERGENCY DEPT. She will return to work/school on: April 6, 2020 If there are questions or concerns please have the patient contact our office.  
 
 
 
Sincerely, 
 
 
 
CARRIE Ash  
 
 Trial trazodone over xanax  CSV completed today

## 2024-12-09 NOTE — ASSESSMENT & PLAN NOTE
Intermittently takes xanax for sleep and anxiety  CSV completed today so she can have refill  Will trial trazodone to help with sleep

## 2024-12-10 LAB
AMPHETAMINES UR QL SCN: NORMAL
BARBITURATES UR QL SCN: NORMAL
BZE UR QL SCN: NORMAL
CANNABINOIDS UR QL SCN: NORMAL
CREAT UR-MCNC: 31.6 MG/DL (ref 20–320)
FLUAV RNA RESP QL NAA+PROBE: NOT DETECTED
FLUBV RNA RESP QL NAA+PROBE: NOT DETECTED
PCP UR QL SCN: NORMAL
RSV RNA RESP QL NAA+PROBE: NOT DETECTED
SARS-COV-2 ORF1AB RESP QL NAA+PROBE: NOT DETECTED

## 2024-12-19 ENCOUNTER — TELEMEDICINE (OUTPATIENT)
Dept: PRIMARY CARE | Facility: CLINIC | Age: 69
End: 2024-12-19
Payer: MEDICARE

## 2024-12-19 ENCOUNTER — TELEPHONE (OUTPATIENT)
Dept: PRIMARY CARE | Facility: CLINIC | Age: 69
End: 2024-12-19

## 2024-12-19 DIAGNOSIS — F32.1 CURRENT MODERATE EPISODE OF MAJOR DEPRESSIVE DISORDER WITHOUT PRIOR EPISODE (MULTI): ICD-10-CM

## 2024-12-19 DIAGNOSIS — J30.9 ALLERGIC RHINITIS, UNSPECIFIED SEASONALITY, UNSPECIFIED TRIGGER: ICD-10-CM

## 2024-12-19 DIAGNOSIS — G62.9 PERIPHERAL POLYNEUROPATHY: ICD-10-CM

## 2024-12-19 DIAGNOSIS — F41.9 ANXIETY: ICD-10-CM

## 2024-12-19 DIAGNOSIS — F51.01 PRIMARY INSOMNIA: Primary | ICD-10-CM

## 2024-12-19 PROBLEM — G47.00 INSOMNIA: Status: ACTIVE | Noted: 2024-12-09

## 2024-12-19 PROCEDURE — 1160F RVW MEDS BY RX/DR IN RCRD: CPT | Performed by: INTERNAL MEDICINE

## 2024-12-19 PROCEDURE — 1123F ACP DISCUSS/DSCN MKR DOCD: CPT | Performed by: INTERNAL MEDICINE

## 2024-12-19 PROCEDURE — 99214 OFFICE O/P EST MOD 30 MIN: CPT | Performed by: INTERNAL MEDICINE

## 2024-12-19 PROCEDURE — 1159F MED LIST DOCD IN RCRD: CPT | Performed by: INTERNAL MEDICINE

## 2024-12-19 PROCEDURE — G2211 COMPLEX E/M VISIT ADD ON: HCPCS | Performed by: INTERNAL MEDICINE

## 2024-12-19 PROCEDURE — 1036F TOBACCO NON-USER: CPT | Performed by: INTERNAL MEDICINE

## 2024-12-19 RX ORDER — MIRTAZAPINE 7.5 MG/1
7.5 TABLET, FILM COATED ORAL NIGHTLY
Qty: 30 TABLET | Refills: 0 | Status: SHIPPED | OUTPATIENT
Start: 2024-12-19 | End: 2025-01-18

## 2024-12-19 RX ORDER — ALPRAZOLAM 1 MG/1
TABLET ORAL
Qty: 21 TABLET | Refills: 0 | Status: SHIPPED | OUTPATIENT
Start: 2024-12-19

## 2024-12-19 RX ORDER — LORATADINE 10 MG/1
10 TABLET ORAL DAILY
Start: 2024-12-19

## 2024-12-19 NOTE — PROGRESS NOTES
An interactive telecommunication system which permits real time communications between the patient (at the originating site) and provider (at the distant site) was utilized to provide this telehealth service.    Verbal consent was requested and obtained from the patient for this telehealth visit.    We have confirmed the patient wishes to see me, Dr. Kathryn Kang, a board certified Internal Medicine physician with an active [place] medical license as well as verified the patient's identity and physical location in [place].  Audio and Visual both.      CC/HPI:   Sleeping Problem.    Issues sleeping, out of the blue, not sure of the cause  Last month it started, not sure why  Cant fall asleep until 3am,  feels wide awake  Tried trazodone 100mg and not effective  Taking xanax to get a few more hours of sleep bc made her sleepy when took for anxiety  No med changes aside from stopping cymbalta  9/24 restarted zoloft  I0/17/24: increase zoloft from 50 mg every day to 100 mg every day. monitor      Assessment and Plan:  Problem List Items Addressed This Visit          Medium    Anxiety    Overview     S/p SSRI, Buspar and klonopin  8/21/24: anxiety provoked with unexplained medical symptoms; 7 day xanax RX provided  10/17/24: increase zoloft from 50 mg every day to 100 mg every day. monitor         Relevant Medications    ALPRAZolam (Xanax) 1 mg tablet    Current moderate episode of major depressive disorder without prior episode (Multi)    Overview     s/p  hospitalization 2019  at the time: [facility]: 55 -60-51 Moderate symptoms or moderate difficulty in social, occupational or school functioning  Weaned off Paxil bc became ineffective  Failed Pamelor (?)  Stopped Seroquel in 2021  Self-Stopped Buspar and Wellbutrin in 2022  Stopped Cymbalta in 2024 (AE, GI)  10/17/24: restarted zoloft 50 mg in Sept. Increase to 100 mg every day today.  Working well         Relevant Medications    mirtazapine (Remeron) 7.5 mg tablet    Insomnia -  Primary    Overview     Failed OTC agents, Benadryl/Tylenol PM  Trazodone 100mg ineffective  Xanax prn effective         Current Assessment & Plan     Will try Remeron 7.5mg  Stop Trazozdone  I have discussed both the risks and benefits of the treatment plan, including potential adverse effects/benefits of medication as well as not taking medication (and the potential risks of that), and patient understands/agrees with the treatment plan.           Relevant Medications    mirtazapine (Remeron) 7.5 mg tablet    Peripheral neuropathy    Overview     EMG done  On Gabapentin , Cymbalta (AE) and Elavil in past  On Lyrica, and Metanx and Biotin  On NSAIDs  Seeing Neuro          Other Visit Diagnoses       Allergic rhinitis, unspecified seasonality, unspecified trigger        Relevant Medications    loratadine (Claritin) 10 mg tablet            ROS otherwise negative aside from what was mentioned above in HPI.    Vitals  LMP  (LMP Unknown)   There is no height or weight on file to calculate BMI.  Physical Exam  Gen: Alert, NAD    Allergies and Medications  Fluoxetine, Niacin, Doxycycline, and Sulfa (sulfonamide antibiotics)  Current Outpatient Medications   Medication Instructions    ALPRAZolam (Xanax) 1 mg tablet Take 1 tablet up to tid prn for anxiety    biotin 1 mg capsule 1 capsule, Daily    cholecalciferol (Vitamin D-3) 25 MCG (1000 UT) tablet 1 tablet, Daily    hydrOXYzine HCL (ATARAX) 25 mg, oral, Every 6 hours PRN    levothyroxine (SYNTHROID, LEVOXYL) 50 mcg, oral, Daily    loratadine (CLARITIN) 10 mg, oral, Daily    meloxicam (MOBIC) 15 mg, oral, Daily    mirtazapine (REMERON) 7.5 mg, oral, Nightly    multivitamin tablet 1 tablet, Daily    pregabalin (LYRICA) 200 mg, 3 times daily    sertraline (ZOLOFT) 100 mg, oral, Daily    simvastatin (ZOCOR) 40 mg, oral, Daily    vitamin E 180 mg (400 unit) capsule 400-800 units daily

## 2024-12-19 NOTE — TELEPHONE ENCOUNTER
Patient left VM stating she would like to discuss medication with SF.  I spoke with patient and she is ok with VV after the holidays.  Please call and schedule

## 2024-12-19 NOTE — ASSESSMENT & PLAN NOTE
Will try Remeron 7.5mg  Stop Trazozdone  I have discussed both the risks and benefits of the treatment plan, including potential adverse effects/benefits of medication as well as not taking medication (and the potential risks of that), and patient understands/agrees with the treatment plan.

## 2025-01-06 ENCOUNTER — APPOINTMENT (OUTPATIENT)
Dept: PRIMARY CARE | Facility: CLINIC | Age: 70
End: 2025-01-06
Payer: MEDICARE

## 2025-02-24 ENCOUNTER — TELEPHONE (OUTPATIENT)
Dept: PRIMARY CARE | Facility: CLINIC | Age: 70
End: 2025-02-24
Payer: COMMERCIAL

## 2025-02-24 DIAGNOSIS — F41.9 ANXIETY: ICD-10-CM

## 2025-02-24 RX ORDER — ALPRAZOLAM 1 MG/1
TABLET ORAL
Qty: 21 TABLET | Refills: 0 | Status: SHIPPED | OUTPATIENT
Start: 2025-02-24

## 2025-02-24 NOTE — TELEPHONE ENCOUNTER
Patient left  stating she has had diarrhea since last Friday, 2/21/2025. Patient has been taking Pepto Bismal and it is not helping.   She tried to eat rice, toast and a baked potato but not anything else.  She is going to try Imodium and wondered if there is snything else to try.  Please advise

## 2025-02-24 NOTE — TELEPHONE ENCOUNTER
Spoke with patient.  Patient stated she never started the magnesium glycinate.  She stated she took to Imodium and had diarrhea about 1/2 hour later but has not had anything in the last hour or so.  Relayed to call office by 4 if it is continuing and if it is will schedule later in the week.

## 2025-02-28 ENCOUNTER — TELEPHONE (OUTPATIENT)
Dept: PRIMARY CARE | Facility: CLINIC | Age: 70
End: 2025-02-28
Payer: COMMERCIAL

## 2025-02-28 NOTE — TELEPHONE ENCOUNTER
Provided Patient with two numbers. She is going to reach out and ask a few questions and will call us back if she needs further assistance.

## 2025-02-28 NOTE — TELEPHONE ENCOUNTER
Can give her some recs from my folder      FYI [facility] the front has a folder of all my specialist recommendations  So you can just get that from them in future  Can format referral if needs one

## 2025-02-28 NOTE — TELEPHONE ENCOUNTER
Patient called in, says she has a fibroma and has had it for years. Recently it started causing her some pain and she wants to know if you can recommend a podiatrist for her.

## 2025-03-14 ENCOUNTER — PATIENT MESSAGE (OUTPATIENT)
Dept: PRIMARY CARE | Facility: CLINIC | Age: 70
End: 2025-03-14
Payer: COMMERCIAL

## 2025-03-14 NOTE — PATIENT COMMUNICATION
Spoke to pt relayed ok to do what works for now schedule appt next week  Transferred pt up front to schedule

## 2025-03-20 ENCOUNTER — APPOINTMENT (OUTPATIENT)
Dept: PRIMARY CARE | Facility: CLINIC | Age: 70
End: 2025-03-20
Payer: COMMERCIAL

## 2025-03-20 ENCOUNTER — TELEPHONE (OUTPATIENT)
Dept: PRIMARY CARE | Facility: CLINIC | Age: 70
End: 2025-03-20

## 2025-03-20 DIAGNOSIS — F51.01 PRIMARY INSOMNIA: ICD-10-CM

## 2025-03-20 DIAGNOSIS — F41.9 ANXIETY: Primary | ICD-10-CM

## 2025-03-20 DIAGNOSIS — R19.8 ALTERNATING CONSTIPATION AND DIARRHEA: ICD-10-CM

## 2025-03-20 PROCEDURE — 1159F MED LIST DOCD IN RCRD: CPT | Performed by: NURSE PRACTITIONER

## 2025-03-20 PROCEDURE — 1160F RVW MEDS BY RX/DR IN RCRD: CPT | Performed by: NURSE PRACTITIONER

## 2025-03-20 PROCEDURE — G2211 COMPLEX E/M VISIT ADD ON: HCPCS | Performed by: NURSE PRACTITIONER

## 2025-03-20 PROCEDURE — 1036F TOBACCO NON-USER: CPT | Performed by: NURSE PRACTITIONER

## 2025-03-20 PROCEDURE — 1123F ACP DISCUSS/DSCN MKR DOCD: CPT | Performed by: NURSE PRACTITIONER

## 2025-03-20 PROCEDURE — 99213 OFFICE O/P EST LOW 20 MIN: CPT | Performed by: NURSE PRACTITIONER

## 2025-03-20 RX ORDER — MIRTAZAPINE 45 MG/1
45 TABLET, FILM COATED ORAL NIGHTLY
Qty: 90 TABLET | Refills: 3 | Status: SHIPPED | OUTPATIENT
Start: 2025-03-20 | End: 2026-03-15

## 2025-03-20 RX ORDER — ALPRAZOLAM 1 MG/1
1 TABLET ORAL 3 TIMES DAILY PRN
Qty: 45 TABLET | Refills: 0 | Status: SHIPPED | OUTPATIENT
Start: 2025-03-20

## 2025-03-20 ASSESSMENT — ANXIETY QUESTIONNAIRES
2. NOT BEING ABLE TO STOP OR CONTROL WORRYING: SEVERAL DAYS
6. BECOMING EASILY ANNOYED OR IRRITABLE: SEVERAL DAYS
4. TROUBLE RELAXING: SEVERAL DAYS
7. FEELING AFRAID AS IF SOMETHING AWFUL MIGHT HAPPEN: SEVERAL DAYS
1. FEELING NERVOUS, ANXIOUS, OR ON EDGE: SEVERAL DAYS
IF YOU CHECKED OFF ANY PROBLEMS ON THIS QUESTIONNAIRE, HOW DIFFICULT HAVE THESE PROBLEMS MADE IT FOR YOU TO DO YOUR WORK, TAKE CARE OF THINGS AT HOME, OR GET ALONG WITH OTHER PEOPLE: SOMEWHAT DIFFICULT
3. WORRYING TOO MUCH ABOUT DIFFERENT THINGS: NOT AT ALL
GAD7 TOTAL SCORE: 5
5. BEING SO RESTLESS THAT IT IS HARD TO SIT STILL: NOT AT ALL

## 2025-03-20 ASSESSMENT — PATIENT HEALTH QUESTIONNAIRE - PHQ9
2. FEELING DOWN, DEPRESSED OR HOPELESS: SEVERAL DAYS
SUM OF ALL RESPONSES TO PHQ9 QUESTIONS 1 AND 2: 2
10. IF YOU CHECKED OFF ANY PROBLEMS, HOW DIFFICULT HAVE THESE PROBLEMS MADE IT FOR YOU TO DO YOUR WORK, TAKE CARE OF THINGS AT HOME, OR GET ALONG WITH OTHER PEOPLE: VERY DIFFICULT
1. LITTLE INTEREST OR PLEASURE IN DOING THINGS: SEVERAL DAYS

## 2025-03-20 NOTE — PROGRESS NOTES
An interactive audio and video telecommunication system which permits real time communications between the patient (at the originating site) and provider (at the distant site) was utilized to provide this telehealth service.  Verbal consent was requested and obtained from the patient for this telehealth visit.     Subjective   Patient ID: Elena Bueno is a 70 y.o. female who presents for CSV (Xanax ).    Taking remeron and xanax  For sleep  Has gained some weight due to remeron  Xanax gets her to sleep, remeron helps her to remain asleep  Uses xanax for anxiety when gets her episodes  Episodes of fatigue, diarrhea followed by panic attacks  Wants SIBO test  Due to diarrhea        Review of Systems  ROS completely negative except what was mentioned in the HPI.  Problem List, surgical, social, and family histories which were reviewed and updated as necessary within the EMR. I also personally reviewed the notes, labs, and imaging that pertained to what was documented or discussed in the HPI.    Objective   Physical Exam  Vitals and nursing note reviewed.   Constitutional:       Appearance: Normal appearance.   Pulmonary:      Effort: Pulmonary effort is normal.      Comments: Speaking in complete sentences  Neurological:      General: No focal deficit present.      Mental Status: She is alert and oriented to person, place, and time. Mental status is at baseline.   Psychiatric:         Mood and Affect: Mood normal.         Behavior: Behavior normal.         Thought Content: Thought content normal.         Judgment: Judgment normal.         LMP  (LMP Unknown)     Assessment/Plan    Problem List Items Addressed This Visit       Alternating constipation and diarrhea    Overview     8/19/24:   Uroporphyrin, 24H Ur: 41 (High)  Normal 0 - 24 ug/24 hr   8/24 CT abd/pelvis: 1. Findings suggestive of a mild enterocolitis. 2. Mild hepatic steatosis.  3. Colonic diverticulosis without findings of diverticulitis  10/17/24: less  frequent now that she's started zoloft. She is scheduled upcoming for EGD/cscope         Relevant Orders    Breath Hydrogen Test-Bacterial Overgrowth    Anxiety - Primary    Overview     S/p SSRI, Buspar and klonopin  8/21/24: anxiety provoked with unexplained medical symptoms; 7 day xanax RX provided  10/17/24: increase zoloft from 50 mg every day to 100 mg every day. Monitor  12/24: failed trazodone, started mirtazapine         Current Assessment & Plan     Failed trazodone  Started mirtazapine and titrated up to 45mg  Does help her to stay asleep longer with the xanax  Some weight gain  continue         Relevant Medications    ALPRAZolam (Xanax) 1 mg tablet    Insomnia    Overview     Failed OTC agents, Benadryl/Tylenol PM  Trazodone 100mg ineffective  Xanax prn effective         Relevant Medications    mirtazapine (Remeron) 45 mg tablet      OARRS:  Iliana Willett, APRN-CNP on 3/20/2025  8:17 AM  I have personally reviewed the OARRS report for Elena Bueno. I have considered the risks of abuse, dependence, addiction and diversion and I believe that it is clinically appropriate for Elena Bueno to be prescribed this medication    Is the patient prescribed a combination of a benzodiazepine and opioid?  No    Last Urine Drug Screen / ordered today: No  Recent Results (from the past 8760 hours)   Confirmation Opiate/Opioid/Benzo Prescription Compliance    Collection Time: 12/09/24  4:06 PM   Result Value Ref Range    Clonazepam <25 <25 ng/mL    7-Aminoclonazepam <25 <25 ng/mL    Alprazolam <25 <25 ng/mL    Alpha-Hydroxyalprazolam <25 <25 ng/mL    Midazolam <25 <25 ng/mL    Alpha-Hydroxymidazolam <25 <25 ng/mL    Chlordiazepoxide <25 <25 ng/mL    Diazepam <25 <25 ng/mL    Nordiazepam <25 <25 ng/mL    Temazepam <25 <25 ng/mL    Oxazepam <25 <25 ng/mL    Lorazepam <25 <25 ng/mL    Methadone <25 <25 ng/mL    EDDP <25 <25 ng/mL    6-Acetylmorphine <25 <25 ng/mL    Codeine <50 <50 ng/mL    Hydrocodone <25 <25 ng/mL     Hydromorphone <25 <25 ng/mL    Morphine  <50 <50 ng/mL    Norhydrocodone <25 <25 ng/mL    Noroxycodone <25 <25 ng/mL    Oxycodone <25 <25 ng/mL    Oxymorphone <25 <25 ng/mL    Fentanyl <2.5 <2.5 ng/mL    Norfentanyl <2.5 <2.5 ng/mL    Tramadol <50 <50 ng/mL    O-Desmethyltramadol <50 <50 ng/mL    Zolpidem <25 <25 ng/mL    Zolpidem Metabolite (ZCA) <25 <25 ng/mL   Screen Opiate/Opioid/Benzo Prescription Compliance    Collection Time: 24  4:06 PM   Result Value Ref Range    Creatinine, Urine Random 31.6 20.0 - 320.0 mg/dL    Amphetamine Screen, Urine Presumptive Negative Presumptive Negative    Barbiturate Screen, Urine Presumptive Negative Presumptive Negative    Cannabinoid Screen, Urine Presumptive Negative Presumptive Negative    Cocaine Metabolite Screen, Urine Presumptive Negative Presumptive Negative    PCP Screen, Urine Presumptive Negative Presumptive Negative     Results are as expected.         Controlled Substance Agreement:  Date of the Last Agreement: 24  Reviewed Controlled Substance Agreement including but not limited to the benefits, risks, and alternatives to treatment with a Controlled Substance medication(s).    Benzodiazepines:  What is the patient's goal of therapy? Sleep / anxiety   Is this being achieved with current treatment? Yes     TANYA-7:  Over the last 2 weeks, how often have you been bothered by any of the following problems?  Feeling nervous, anxious, or on edge: 1  Not being able to stop or control worryin  Worrying too much about different things: 0  Trouble relaxin  Being so restless that it is hard to sit still: 0  Becoming easily annoyed or irritable: 1  Feeling afraid as if something awful might happen: 1  TANYA-7 Total Score: 5        Activities of Daily Living:   Is your overall impression that this patient is benefiting (symptom reduction outweighs side effects) from benzodiazepine therapy? Yes     1. Physical Functioning: Same  2. Family Relationship: Same  3.  Social Relationship: Same  4. Mood: Better  5. Sleep Patterns: Better  6. Overall Function: Better

## 2025-03-20 NOTE — TELEPHONE ENCOUNTER
Left vm for patient to call GI department to schedule Endoscopy test ordered at [phone]. Also asked for her to call us and schedule a 3 mo. F/U visit with YSABEL.

## 2025-03-20 NOTE — ASSESSMENT & PLAN NOTE
Failed trazodone  Started mirtazapine and titrated up to 45mg  Does help her to stay asleep longer with the xanax  Some weight gain  continue

## 2025-04-01 ENCOUNTER — APPOINTMENT (OUTPATIENT)
Dept: PODIATRY | Facility: CLINIC | Age: 70
End: 2025-04-01
Payer: COMMERCIAL

## 2025-04-01 DIAGNOSIS — M20.42 HAMMERTOE, BILATERAL: ICD-10-CM

## 2025-04-01 DIAGNOSIS — M20.41 HAMMERTOE, BILATERAL: ICD-10-CM

## 2025-04-01 DIAGNOSIS — L60.3 ONYCHODYSTROPHY: ICD-10-CM

## 2025-04-01 DIAGNOSIS — D21.21 FIBROMA OF RIGHT FOOT: Primary | ICD-10-CM

## 2025-04-01 PROCEDURE — 1123F ACP DISCUSS/DSCN MKR DOCD: CPT | Performed by: PODIATRIST

## 2025-04-01 PROCEDURE — 1036F TOBACCO NON-USER: CPT | Performed by: PODIATRIST

## 2025-04-01 PROCEDURE — 99203 OFFICE O/P NEW LOW 30 MIN: CPT | Performed by: PODIATRIST

## 2025-04-01 PROCEDURE — 1159F MED LIST DOCD IN RCRD: CPT | Performed by: PODIATRIST

## 2025-04-01 NOTE — PROGRESS NOTES
History Of Present Illness  Elena Bueno is a 70 y.o. female presenting with chief complaint of: fibroma of left foot. Thick 2nd toenail b/l.  Patient has had fibroma injected.  Patient understands she has bunion deformities but they are not painful.  PCP Kathryn Kang MD  Last visit 3/20/25 with ADITHYA Lion CNP     Past Medical History  She has a past medical history of Abnormal ultrasound of abdomen (11/15/2022), H/O bone density study, H/O bone density study (2024), H/O chest x-ray (2023), H/O chest x-ray (2024), H/O CT scan, H/O CT scan of abdomen (2024), H/O CT scan of chest, H/O CT scan of chest (2023), H/O diagnostic ultrasound, H/O electromyography, H/O magnetic resonance imaging (2024), H/O magnetic resonance imaging of lumbar spine (2024), H/O upper GI x-ray series (2024), and H/O x-ray of lumbar spine (2024).    Surgical History  She has a past surgical history that includes  section, classic; Hernia repair; Cervical biopsy w/ loop electrode excision; and Esophagogastroduodenoscopy.     Social History  She reports that she has quit smoking. Her smoking use included cigarettes. She has never used smokeless tobacco. She reports current alcohol use. She reports that she does not use drugs.    Family History  Family History   Problem Relation Name Age of Onset    Pancreatic cancer Mother           ar 88    Heart failure Father           at 88    Other (thyroid) Other s     Obesity Other b     COPD Other b     Idiopathic pulmonary fibrosis Other b     Other (psychiatric issues/ ocd) Other b     Breast cancer Father's Sister          Allergies  Fluoxetine, Niacin, Doxycycline, and Sulfa (sulfonamide antibiotics)    Medications  Current Outpatient Medications   Medication Sig Dispense Refill    ALPRAZolam (Xanax) 1 mg tablet Take 1 tablet (1 mg) by mouth 3 times a day as needed for anxiety or sleep. Take 1 tablet up to THREE TIMES  DAILY AS NEEDED for anxiety 45 tablet 0    biotin 1 mg capsule Take 1 capsule (1 mg) by mouth once daily. Taking 10,000 mg      cholecalciferol (Vitamin D-3) 25 MCG (1000 UT) tablet Take 1 tablet (25 mcg) by mouth once daily.      levothyroxine (Synthroid, Levoxyl) 50 mcg tablet TAKE 1 TABLET EVERY DAY 90 tablet 3    meloxicam (Mobic) 15 mg tablet TAKE 1 TABLET EVERY DAY 90 tablet 3    mirtazapine (Remeron) 45 mg tablet Take 1 tablet (45 mg) by mouth once daily at bedtime. 90 tablet 3    multivitamin tablet Take 1 tablet by mouth once daily.      pregabalin (Lyrica) 200 mg capsule Take 1 capsule (200 mg) by mouth 3 times a day.      sertraline (Zoloft) 100 mg tablet Take 1 tablet (100 mg) by mouth once daily. 90 tablet 3    simvastatin (Zocor) 40 mg tablet TAKE 1 TABLET EVERY DAY 90 tablet 3    vitamin E 180 mg (400 unit) capsule 400-800 units daily 30 capsule 11    hydrOXYzine HCL (Atarax) 25 mg tablet Take 1 tablet (25 mg) by mouth every 6 hours if needed for anxiety. 120 tablet 0    loratadine (Claritin) 10 mg tablet Take 1 tablet (10 mg) by mouth once daily.       No current facility-administered medications for this visit.       Review of Systems    REVIEW OF SYSTEMS  GENERAL:  Negative for malaise, significant weight loss, fever  CARDIOVASCULAR: leg swelling   MUSCULOSKELETAL:  Negative for joint pain or swelling, back pain, and muscle pain.  SKIN:  Negative for lesions, rash, and itching  PSYCH:  Negative for sleep disturbance, mood disorder and recent psychosocial stressors  NEURO: Negative, denies any burning, tingling or numbness     Objective:   Vasc: DP and PT pulses are palpable bilateral.  CFT is less than 3 seconds bilateral.  Skin temperature is warm to cool proximal to distal bilateral.      Neuro:  Light touch is intact to the foot bilateral.  Protective sensation is intact to the foot when tested with the 5.07 SWM bilateral.  There is no clonus noted.  The hallux is downgoing bilateral.      Derm:  Nails are normal. Skin is supple with normal texture and turgor noted.  Webspaces are clean, dry and intact bilateral.  There are no hyperkeratoses, ulcerations, verruca or other lesions noted.  Toenail changes are noted to the second toe bilateral.    Ortho: Muscle strength is 5/5 for all pedal groups tested.  Patient has bilateral bunion deformity.  She has tailor's bunion.  She has a long second toe with digital clawing.  Patient has a large mass underneath the most distal portion of her medial band of plantar fascia on the right foot.  Assessment/Plan     Patient has a painful plantar fibroma.  Patient has hammertoes causing nail deformity.       Patient is not going to consider surgery at this time.  I indicated that she could try to get a custom fit insert made to offload her fibroma.  She will consider this.  Explained to the patient that she does have nail deformity secondary to her long second toes that are climbing.  She does not have a fungal infection.      Hodan Mccoy, CMA

## 2025-04-07 ENCOUNTER — TELEPHONE (OUTPATIENT)
Dept: GASTROENTEROLOGY | Facility: CLINIC | Age: 70
End: 2025-04-07
Payer: COMMERCIAL

## 2025-04-07 NOTE — TELEPHONE ENCOUNTER
Spoke with patient. Informed her to keep her HBT for now and I will speak with physician when she returns to the office.     She verbalized understanding.

## 2025-04-10 ENCOUNTER — OFFICE VISIT (OUTPATIENT)
Dept: GASTROENTEROLOGY | Facility: CLINIC | Age: 70
End: 2025-04-10
Payer: COMMERCIAL

## 2025-04-10 VITALS
HEART RATE: 69 BPM | WEIGHT: 134 LBS | BODY MASS INDEX: 24.66 KG/M2 | SYSTOLIC BLOOD PRESSURE: 119 MMHG | TEMPERATURE: 98.5 F | HEIGHT: 62 IN | DIASTOLIC BLOOD PRESSURE: 82 MMHG

## 2025-04-10 DIAGNOSIS — R19.8 ALTERNATING CONSTIPATION AND DIARRHEA: ICD-10-CM

## 2025-04-10 PROCEDURE — 91065 BREATH HYDROGEN/METHANE TEST: CPT

## 2025-04-10 PROCEDURE — 91065 BREATH HYDROGEN/METHANE TEST: CPT | Performed by: INTERNAL MEDICINE

## 2025-04-10 PROCEDURE — 3008F BODY MASS INDEX DOCD: CPT | Performed by: INTERNAL MEDICINE

## 2025-04-10 PROCEDURE — 1123F ACP DISCUSS/DSCN MKR DOCD: CPT | Performed by: INTERNAL MEDICINE

## 2025-04-10 NOTE — PROGRESS NOTES
Patient ID: Elena Bueno is a 70 y.o. female.    Breath Hydrogen Test-Bacterial Overgrowth    Date/Time: 4/10/2025 11:03 AM    Performed by: Tere Chavez MA  Authorized by: FADI Benavides    Consent:     Consent obtained:  Verbal  Universal protocol:     Procedure explained and questions answered to patient or proxy's satisfaction: yes    Procedure specific details:      Patient had some abdominal pressure only.   Post-procedure details:     Procedure completion:  Tolerated  Hydrogen Breath Analysis Consultation Sheet    Referring Provider: FADI Benavides  [address] [facility], [address]  [place] [address]    Indication: Rule out SIBO    Symptoms: alternating constipation and diarrhea    Age: 70 y.o.  Weight:   Vitals:    04/10/25 1102   Weight: 60.8 kg (134 lb)     Substrate: Dextrose   Dose: 75 gram    Last Meal: White rice, white bread, baked salmon- last meal 6 pm  Recent Antibiotics: patient denies    RESULTS:   Time PPM (H2) APPM* (CH4) CO2 Correction   Baseline #1 0854 1 6 3.8 1.44   Baseline #2 0855 1 6 3.9 1.41   *Challenge Dose Sugar: 75 gram Dextrose at 9:00 am  15' 0915 3 6 3.9 1.41   30' 0930 2 6 3.6 1.52   45' 0945 2 6 3.6 1.52   60' 1000 1 5 4.0 1.37   75' 1015 1 6 3.9 1.41   90' 1030 3 6 3.5 1.57   105' 1045 3 7 4.2 1.30   120' 1100 2 7 3.3 1.66   135'        150'        165'        180'          Impression: Negative for SIBO

## 2025-04-18 DIAGNOSIS — F41.9 ANXIETY: ICD-10-CM

## 2025-04-18 LAB
25(OH)D3+25(OH)D2 SERPL-MCNC: 46 NG/ML (ref 30–100)
ALBUMIN SERPL-MCNC: 4.7 G/DL (ref 3.6–5.1)
ALP SERPL-CCNC: 85 U/L (ref 37–153)
ALT SERPL-CCNC: 14 U/L (ref 6–29)
ANION GAP SERPL CALCULATED.4IONS-SCNC: 7 MMOL/L (CALC) (ref 7–17)
AST SERPL-CCNC: 19 U/L (ref 10–35)
BASOPHILS # BLD AUTO: 42 CELLS/UL (ref 0–200)
BASOPHILS NFR BLD AUTO: 0.7 %
BILIRUB SERPL-MCNC: 0.3 MG/DL (ref 0.2–1.2)
BUN SERPL-MCNC: 18 MG/DL (ref 7–25)
CALCIUM SERPL-MCNC: 9.8 MG/DL (ref 8.6–10.4)
CHLORIDE SERPL-SCNC: 107 MMOL/L (ref 98–110)
CHOLEST SERPL-MCNC: 174 MG/DL
CHOLEST/HDLC SERPL: 2.5 (CALC)
CO2 SERPL-SCNC: 29 MMOL/L (ref 20–32)
CREAT SERPL-MCNC: 0.75 MG/DL (ref 0.5–1.05)
EGFRCR SERPLBLD CKD-EPI 2021: 86 ML/MIN/1.73M2
EOSINOPHIL # BLD AUTO: 120 CELLS/UL (ref 15–500)
EOSINOPHIL NFR BLD AUTO: 2 %
ERYTHROCYTE [DISTWIDTH] IN BLOOD BY AUTOMATED COUNT: 12.4 % (ref 11–15)
EST. AVERAGE GLUCOSE BLD GHB EST-MCNC: 120 MG/DL
EST. AVERAGE GLUCOSE BLD GHB EST-SCNC: 6.6 MMOL/L
GLUCOSE SERPL-MCNC: 95 MG/DL (ref 65–99)
HBA1C MFR BLD: 5.8 %
HCT VFR BLD AUTO: 44.2 % (ref 35–45)
HDLC SERPL-MCNC: 69 MG/DL
HGB BLD-MCNC: 14.2 G/DL (ref 11.7–15.5)
LDLC SERPL CALC-MCNC: 86 MG/DL (CALC)
LYMPHOCYTES # BLD AUTO: 2112 CELLS/UL (ref 850–3900)
LYMPHOCYTES NFR BLD AUTO: 35.2 %
MCH RBC QN AUTO: 29.8 PG (ref 27–33)
MCHC RBC AUTO-ENTMCNC: 32.1 G/DL (ref 32–36)
MCV RBC AUTO: 92.9 FL (ref 80–100)
MONOCYTES # BLD AUTO: 360 CELLS/UL (ref 200–950)
MONOCYTES NFR BLD AUTO: 6 %
NEUTROPHILS # BLD AUTO: 3366 CELLS/UL (ref 1500–7800)
NEUTROPHILS NFR BLD AUTO: 56.1 %
NONHDLC SERPL-MCNC: 105 MG/DL (CALC)
PLATELET # BLD AUTO: 150 THOUSAND/UL (ref 140–400)
PMV BLD REES-ECKER: 13.3 FL (ref 7.5–12.5)
POTASSIUM SERPL-SCNC: 4.6 MMOL/L (ref 3.5–5.3)
PROT SERPL-MCNC: 6.8 G/DL (ref 6.1–8.1)
RBC # BLD AUTO: 4.76 MILLION/UL (ref 3.8–5.1)
SODIUM SERPL-SCNC: 143 MMOL/L (ref 135–146)
TRIGL SERPL-MCNC: 95 MG/DL
TSH SERPL-ACNC: 0.58 MIU/L (ref 0.4–4.5)
WBC # BLD AUTO: 6 THOUSAND/UL (ref 3.8–10.8)

## 2025-04-18 RX ORDER — ALPRAZOLAM 1 MG/1
1 TABLET ORAL 3 TIMES DAILY PRN
Qty: 45 TABLET | Refills: 0 | Status: SHIPPED | OUTPATIENT
Start: 2025-04-18

## 2025-04-19 LAB
APPEARANCE UR: CLEAR
BACTERIA #/AREA URNS HPF: ABNORMAL /HPF
BACTERIA UR CULT: ABNORMAL
BACTERIA UR CULT: ABNORMAL
BILIRUB UR QL STRIP: NEGATIVE
CAOX CRY #/AREA URNS HPF: ABNORMAL /HPF
COLOR UR: YELLOW
GLUCOSE UR QL STRIP: NEGATIVE
HGB UR QL STRIP: NEGATIVE
HYALINE CASTS #/AREA URNS LPF: ABNORMAL /LPF
KETONES UR QL STRIP: NEGATIVE
LEUKOCYTE ESTERASE UR QL STRIP: NEGATIVE
NITRITE UR QL STRIP: NEGATIVE
PH UR STRIP: 6.5 [PH] (ref 5–8)
PROT UR QL STRIP: NEGATIVE
RBC #/AREA URNS HPF: ABNORMAL /HPF
SERVICE CMNT-IMP: ABNORMAL
SP GR UR STRIP: 1.02 (ref 1–1.03)
SQUAMOUS #/AREA URNS HPF: ABNORMAL /HPF
WBC #/AREA URNS HPF: ABNORMAL /HPF

## 2025-04-21 NOTE — PROGRESS NOTES
REASON FOR VISIT:  diarrhea    HPI:  Elena Bueno is a 70 y.o. female who presents for folloow up if intermittent diarrhea. She had the colonoscopy that noted a TA - random biopsies were neg for microscopic colitis.   She continues to have 2 episodes a month of the diarrhea.  She saw a therapist  She is now avoiding dairy and the episodes of diarrhea still occurred. Over extending herself - watching her grandchildren may be a trigger           Med list notable for    Labs notable for    No fhx of CRC.       Prev endoscopic eval:     REVIEW OF SYSTEMS    Review of Systems   Constitutional: Negative for decreased appetite and weight loss.   Gastrointestinal:  Positive for diarrhea. Negative for constipation, heartburn, hematemesis, hematochezia, melena, nausea and vomiting.          Allergies[1]    Medical History[2]    Surgical History[3]    Family History[4]    Social History     Tobacco Use    Smoking status: Former     Types: Cigarettes    Smokeless tobacco: Never   Substance Use Topics    Alcohol use: Yes     Comment: socially       Current Medications[5]    PHYSICAL EXAM:  LMP  (LMP Unknown)      Physical Exam  Constitutional:       Comments: Awake   HENT:      Head: Normocephalic.   Cardiovascular:      Rate and Rhythm: Normal rate and regular rhythm.   Pulmonary:      Effort: Pulmonary effort is normal.      Breath sounds: Normal breath sounds.   Abdominal:      General: Bowel sounds are normal.      Palpations: Abdomen is soft.   Neurological:      Mental Status: She is alert.   Psychiatric:         Mood and Affect: Mood normal.        ASSESSMENT    70-year-old female presents for evaluation of intermittent diarrhea.  She continues to have flares 1-2 times a month of significant diarrhea and that it can be associated with fatigue and occasional nausea and vomiting.  These tend to occur when she overexerts herself such as watching her 3 grandchildren.  She is working with a therapist.  She underwent a  colonoscopy that was negative for microscopic colitis.  A tubular adenoma was removed and she will be due for repeat surveillance in 5 years.  I do think a lot of this is related to underlying irritable bowel syndrome.  We discussed working with her therapist to set boundaries and take care of herself by minimizing the overexertion.  I did advise her to keep a food diary to try to see if there is any foods that may trigger her symptoms or worsen them when they do occur.  She did start a dairy free diet that did not seem to improve her overall symptoms.  And I gave her information on the FODMAP diet.  She will continue to work through these things and I will see her in follow-up in 6 months    Evaluation requested by Dr. Kathryn Kang MD.   My final recommendations will be communicated back to the requesting physician by way of shared Medical record or letter to requesting physician via fax.      Attending Note:   I have personally performed a face to face assessment of this Patient, which included an interview, physical exam and Assessment and Plan.  I have reviewed and confirmed the history and key findings as documented by the Medical Assistant and edited as appropriate.     Signature: Carolina Dahl MD    Date: 4/21/2025  Time: 9:03 AM          [1]   Allergies  Allergen Reactions    Fluoxetine Other     Years ago   Paranoia   (did well on Paxil for years though)    Niacin Hives    Doxycycline Diarrhea     esophageal ulcer    Sulfa (Sulfonamide Antibiotics) Unknown     Sulfa containing compounds   [2]   Past Medical History:  Diagnosis Date    Abnormal ultrasound of abdomen 11/15/2022    Comment on above: 10/21: 1. Right hepatic lobe lesion measuring up to 0.8 x 0.7cm x 0.7cm cm with imaging characteristics suggestive of a benign hemangioma.2. Previously noted left hepatic lobe lesion is not visualized in the present examination.3. Poor visualization of the pancreas.;    H/O bone density study     11/16/22: Lowest  T score -1.5 10-Year Fracture Risk: Major Osteoporotic Fracture 13.4% Hip Fracture                1.7% 7/16: -1.5 8/20: -1.6, FRAX: Major Osteoporotic Fracture: 13.3%                             Hip Fracture:                1.6%    H/O bone density study 11/19/2024    LOwest T score -1.2    H/O chest x-ray 05/18/2023    normal    H/O chest x-ray 12/09/2024    normal    H/O CT scan     8/20: CT calcium score=0 POTENTIAL BUT NOT DEFINITIVE 4 MM SUBPLEURAL LEFT LOWER LOBE NONCALCIFIED NODULE VERSUS ATELECTASIS/SCAR. ACCORDING TO 2017 REVISION LUNG NODULE FOLLOW-UP GUIDELINES, EVEN IF THIS IS A HIGH RISK PATIENT (SMOKING OR MALIGNANCY HISTORY), FOLLOW-UP CHEST CT IN ONE YEAR WOULD BE OPTIONAL    H/O CT scan of abdomen 08/25/2024    Colonic diverticulosis without findings of diverticulitis.  Question mild wall thickening of the colon versus underdistention.  Mildly thickened small bowel loops in the abdomen and pelvis c/w mild enterocoloitis. Mild hepatic steatosis.    H/O CT scan of chest     3/2021: 1. Stable pulmonary (3-4mm)nodules as above. Per patient risk factors, 12 month follow-up could be obtained as clinically indicated.Pulmonary hyperinflation with emphysematous changes and pleuroparenchymal scarring. Stable prominence of the main pancreatic duct. (4mm), compared to CT 2011. Subacute left-sided rib fractures with bony callus formation. Indeterminate hepatic lesions, 0.8-1cm.    H/O CT scan of chest 11/18/2023    Stable scattered pulmonary nodules measuring 4 mm or less in size. No new pulmonary nodule or mass    H/O diagnostic ultrasound     US RUQ: 10/21: 1. Right hepatic lobe lesion measuring up to 0.8 x 0.7cm x 0.7cm cm with imaging characteristics suggestive of a benign hemangioma. 2. Previously noted left hepatic lobe lesion is not visualized in the present examination. 3. Poor visualization of the pancreas.    H/O electromyography     H/O magnetic resonance imaging 09/26/2024    MRI abd:1.  Tubular 20 x  6 mm fluid signal intensity process pancreatic head favors a dilated pancreatic side duct branch potentially indicating site of branch IPMN. The adjacent pancreatic duct is estimated at about 3 mm. 2. Probably benign liver and kidney cyst    H/O magnetic resonance imaging of lumbar spine 2024    Mild multilevel lumbar spondylosis and facet hypertrophy with no significant spinal canal stenosis and mild scattered neural foraminal narrowing.    H/O upper GI x-ray series 2024    Mild esophageal dysmotility which may be age-related. 2. A few instances of trace spontaneous gastroesophageal reflux occurred when the patient was recumbent. No hiatal hernia otherwise identified. 3. Grossly unremarkable stomach. 4. Relatively rapid small bowel transit time of 20-25 minutes. Otherwise grossly unremarkable morphology of the small bowel    H/O x-ray of lumbar spine 2024    Unremarkable radiographic evaluation of the lumbar spine.   [3]   Past Surgical History:  Procedure Laterality Date    CERVICAL BIOPSY  W/ LOOP ELECTRODE EXCISION       SECTION, CLASSIC      ESOPHAGOGASTRODUODENOSCOPY      HERNIA REPAIR     [4]   Family History  Problem Relation Name Age of Onset    Pancreatic cancer Mother           ar 88    Heart failure Father           at 88    Other (thyroid) Other s     Obesity Other b     COPD Other b     Idiopathic pulmonary fibrosis Other b     Other (psychiatric issues/ ocd) Other b     Breast cancer Father's Sister     [5]   Current Outpatient Medications   Medication Sig Dispense Refill    ALPRAZolam (Xanax) 1 mg tablet Take 1 tablet (1 mg) by mouth 3 times a day as needed for anxiety or sleep. Take 1 tablet up to THREE TIMES DAILY AS NEEDED for anxiety 45 tablet 0    biotin 1 mg capsule Take 1 capsule (1 mg) by mouth once daily. Taking 10,000 mg      cholecalciferol (Vitamin D-3) 25 MCG (1000 UT) tablet Take 1 tablet (25 mcg) by mouth once daily.      hydrOXYzine HCL (Atarax) 25 mg  tablet Take 1 tablet (25 mg) by mouth every 6 hours if needed for anxiety. 120 tablet 0    levothyroxine (Synthroid, Levoxyl) 50 mcg tablet TAKE 1 TABLET EVERY DAY 90 tablet 3    meloxicam (Mobic) 15 mg tablet TAKE 1 TABLET EVERY DAY 90 tablet 3    mirtazapine (Remeron) 45 mg tablet Take 1 tablet (45 mg) by mouth once daily at bedtime. 90 tablet 3    multivitamin tablet Take 1 tablet by mouth once daily.      pregabalin (Lyrica) 200 mg capsule Take 1 capsule (200 mg) by mouth 3 times a day.      sertraline (Zoloft) 100 mg tablet Take 1 tablet (100 mg) by mouth once daily. 90 tablet 3    simvastatin (Zocor) 40 mg tablet TAKE 1 TABLET EVERY DAY 90 tablet 3    vitamin E 180 mg (400 unit) capsule 400-800 units daily 30 capsule 11     No current facility-administered medications for this visit.

## 2025-04-22 ENCOUNTER — APPOINTMENT (OUTPATIENT)
Dept: GASTROENTEROLOGY | Facility: CLINIC | Age: 70
End: 2025-04-22
Payer: COMMERCIAL

## 2025-04-22 VITALS
WEIGHT: 137.9 LBS | OXYGEN SATURATION: 96 % | BODY MASS INDEX: 25.38 KG/M2 | DIASTOLIC BLOOD PRESSURE: 95 MMHG | SYSTOLIC BLOOD PRESSURE: 140 MMHG | TEMPERATURE: 97.8 F | HEIGHT: 62 IN | HEART RATE: 79 BPM

## 2025-04-22 DIAGNOSIS — K58.0 IRRITABLE BOWEL SYNDROME WITH DIARRHEA: Primary | ICD-10-CM

## 2025-04-22 PROCEDURE — G2211 COMPLEX E/M VISIT ADD ON: HCPCS | Performed by: INTERNAL MEDICINE

## 2025-04-22 PROCEDURE — 1159F MED LIST DOCD IN RCRD: CPT | Performed by: INTERNAL MEDICINE

## 2025-04-22 PROCEDURE — 1036F TOBACCO NON-USER: CPT | Performed by: INTERNAL MEDICINE

## 2025-04-22 PROCEDURE — 3008F BODY MASS INDEX DOCD: CPT | Performed by: INTERNAL MEDICINE

## 2025-04-22 PROCEDURE — 99214 OFFICE O/P EST MOD 30 MIN: CPT | Performed by: INTERNAL MEDICINE

## 2025-04-22 PROCEDURE — 1123F ACP DISCUSS/DSCN MKR DOCD: CPT | Performed by: INTERNAL MEDICINE

## 2025-04-22 ASSESSMENT — ENCOUNTER SYMPTOMS
HEMATOCHEZIA: 0
VOMITING: 0
DIARRHEA: 1
CONSTIPATION: 0
HEMATEMESIS: 0
WEIGHT LOSS: 0
DECREASED APPETITE: 0
NAUSEA: 0
HEARTBURN: 0

## 2025-04-24 ENCOUNTER — APPOINTMENT (OUTPATIENT)
Dept: PRIMARY CARE | Facility: CLINIC | Age: 70
End: 2025-04-24
Payer: MEDICARE

## 2025-04-24 VITALS
OXYGEN SATURATION: 98 % | HEART RATE: 92 BPM | SYSTOLIC BLOOD PRESSURE: 125 MMHG | HEIGHT: 62 IN | BODY MASS INDEX: 25.47 KG/M2 | TEMPERATURE: 97.8 F | DIASTOLIC BLOOD PRESSURE: 85 MMHG | WEIGHT: 138.4 LBS

## 2025-04-24 DIAGNOSIS — Z00.00 MEDICARE ANNUAL WELLNESS VISIT, SUBSEQUENT: Primary | ICD-10-CM

## 2025-04-24 DIAGNOSIS — F32.1 CURRENT MODERATE EPISODE OF MAJOR DEPRESSIVE DISORDER WITHOUT PRIOR EPISODE (MULTI): ICD-10-CM

## 2025-04-24 DIAGNOSIS — Z12.4 SCREENING FOR CERVICAL CANCER: ICD-10-CM

## 2025-04-24 DIAGNOSIS — Z13.820 SCREENING FOR OSTEOPOROSIS: ICD-10-CM

## 2025-04-24 DIAGNOSIS — Z71.89 ADVANCE DIRECTIVE DISCUSSED WITH PATIENT: ICD-10-CM

## 2025-04-24 DIAGNOSIS — Z71.89 CARDIAC RISK COUNSELING: ICD-10-CM

## 2025-04-24 DIAGNOSIS — E03.9 HYPOTHYROIDISM, ADULT: ICD-10-CM

## 2025-04-24 DIAGNOSIS — Z13.89 SCREENING FOR MULTIPLE CONDITIONS: ICD-10-CM

## 2025-04-24 DIAGNOSIS — D22.9 CHANGE IN NEVUS: ICD-10-CM

## 2025-04-24 DIAGNOSIS — Z01.419 ENCOUNTER FOR ROUTINE GYNECOLOGIC EXAMINATION IN MEDICARE PATIENT: ICD-10-CM

## 2025-04-24 DIAGNOSIS — E55.9 VITAMIN D DEFICIENCY: ICD-10-CM

## 2025-04-24 DIAGNOSIS — E78.2 HYPERLIPIDEMIA, MIXED: ICD-10-CM

## 2025-04-24 DIAGNOSIS — G47.00 INSOMNIA, UNSPECIFIED TYPE: ICD-10-CM

## 2025-04-24 DIAGNOSIS — K76.0 FATTY LIVER: ICD-10-CM

## 2025-04-24 DIAGNOSIS — Z00.00 ROUTINE ADULT HEALTH MAINTENANCE: ICD-10-CM

## 2025-04-24 DIAGNOSIS — F41.9 ANXIETY: ICD-10-CM

## 2025-04-24 DIAGNOSIS — Z12.31 ENCOUNTER FOR SCREENING MAMMOGRAM FOR BREAST CANCER: ICD-10-CM

## 2025-04-24 DIAGNOSIS — Z12.11 SCREEN FOR COLON CANCER: ICD-10-CM

## 2025-04-24 DIAGNOSIS — Z86.0100 HISTORY OF COLON POLYPS: ICD-10-CM

## 2025-04-24 DIAGNOSIS — M85.9 DISORDER OF BONE DENSITY AND STRUCTURE, UNSPECIFIED: ICD-10-CM

## 2025-04-24 DIAGNOSIS — R73.01 IMPAIRED FASTING GLUCOSE: ICD-10-CM

## 2025-04-24 DIAGNOSIS — G62.9 PERIPHERAL POLYNEUROPATHY: ICD-10-CM

## 2025-04-24 PROBLEM — R53.83 LETHARGY: Status: RESOLVED | Noted: 2024-07-23 | Resolved: 2025-04-24

## 2025-04-24 PROCEDURE — 3008F BODY MASS INDEX DOCD: CPT | Performed by: NURSE PRACTITIONER

## 2025-04-24 PROCEDURE — 87626 HPV SEP HI-RSK TYP&POOL RSLT: CPT

## 2025-04-24 PROCEDURE — 1160F RVW MEDS BY RX/DR IN RCRD: CPT | Performed by: NURSE PRACTITIONER

## 2025-04-24 PROCEDURE — 1159F MED LIST DOCD IN RCRD: CPT | Performed by: NURSE PRACTITIONER

## 2025-04-24 PROCEDURE — 99213 OFFICE O/P EST LOW 20 MIN: CPT | Performed by: NURSE PRACTITIONER

## 2025-04-24 PROCEDURE — G0446 INTENS BEHAVE THER CARDIO DX: HCPCS | Performed by: NURSE PRACTITIONER

## 2025-04-24 PROCEDURE — 1036F TOBACCO NON-USER: CPT | Performed by: NURSE PRACTITIONER

## 2025-04-24 PROCEDURE — G0439 PPPS, SUBSEQ VISIT: HCPCS | Performed by: NURSE PRACTITIONER

## 2025-04-24 PROCEDURE — G2211 COMPLEX E/M VISIT ADD ON: HCPCS | Performed by: NURSE PRACTITIONER

## 2025-04-24 PROCEDURE — G0101 CA SCREEN;PELVIC/BREAST EXAM: HCPCS | Performed by: NURSE PRACTITIONER

## 2025-04-24 PROCEDURE — 99497 ADVNCD CARE PLAN 30 MIN: CPT | Performed by: NURSE PRACTITIONER

## 2025-04-24 PROCEDURE — G0444 DEPRESSION SCREEN ANNUAL: HCPCS | Performed by: NURSE PRACTITIONER

## 2025-04-24 PROCEDURE — 1123F ACP DISCUSS/DSCN MKR DOCD: CPT | Performed by: NURSE PRACTITIONER

## 2025-04-24 SDOH — ECONOMIC STABILITY: INCOME INSECURITY: IN THE LAST 12 MONTHS, WAS THERE A TIME WHEN YOU WERE NOT ABLE TO PAY THE MORTGAGE OR RENT ON TIME?: NO

## 2025-04-24 SDOH — ECONOMIC STABILITY: TRANSPORTATION INSECURITY
IN THE PAST 12 MONTHS, HAS LACK OF TRANSPORTATION KEPT YOU FROM MEETINGS, WORK, OR FROM GETTING THINGS NEEDED FOR DAILY LIVING?: NO

## 2025-04-24 SDOH — ECONOMIC STABILITY: TRANSPORTATION INSECURITY
IN THE PAST 12 MONTHS, HAS THE LACK OF TRANSPORTATION KEPT YOU FROM MEDICAL APPOINTMENTS OR FROM GETTING MEDICATIONS?: NO

## 2025-04-24 ASSESSMENT — SOCIAL DETERMINANTS OF HEALTH (SDOH)
IN THE PAST 12 MONTHS, HAS THE ELECTRIC, GAS, OIL, OR WATER COMPANY THREATENED TO SHUT OFF SERVICE IN YOUR HOME?: NO
HOW HARD IS IT FOR YOU TO PAY FOR THE VERY BASICS LIKE FOOD, HOUSING, MEDICAL CARE, AND HEATING?: NOT HARD AT ALL

## 2025-04-24 ASSESSMENT — PATIENT HEALTH QUESTIONNAIRE - PHQ9
SUM OF ALL RESPONSES TO PHQ9 QUESTIONS 1 AND 2: 0
1. LITTLE INTEREST OR PLEASURE IN DOING THINGS: NOT AT ALL
2. FEELING DOWN, DEPRESSED OR HOPELESS: NOT AT ALL

## 2025-04-24 NOTE — PROGRESS NOTES
Annual Medicare Wellness Exam/Comprehensive Problem Focused Follow Up  HPI/CC  Chief Complaint   Patient presents with    Medicare Annual Wellness Visit Subsequent     Reviewed chart for care received since last appointment.      Assessment and Plan:  Problem List Items Addressed This Visit          Medium    Advance directive discussed with patient    Overview   4/18/24: HCPOA is Asha Rodas (daughter)  FULL CODE unless no chance for quality of life         Anxiety    Overview   S/p SSRI, Buspar and klonopin  8/21/24: anxiety provoked with unexplained medical symptoms; 7 day xanax RX provided  10/17/24: increase zoloft from 50 mg every day to 100 mg every day. Monitor  12/24: failed trazodone, started mirtazapine         Cardiac risk counseling    Overview   4/18/24:  Current 10-Year ASCVD Risk: 5.5%   4/24/25: ASCVD = 7.4 %   ASA not rec'd         Current moderate episode of major depressive disorder without prior episode (Multi)    Overview   s/p  hospitalization 2019  at the time: [facility]: 55 -60-51 Moderate symptoms or moderate difficulty in social, occupational or school functioning  Weaned off Paxil bc became ineffective  Failed Pamelor (?)  Stopped Seroquel in 2021  Self-Stopped Buspar and Wellbutrin in 2022  Stopped Cymbalta in 2024 (AE, GI)  10/17/24: restarted zoloft 50 mg in Sept. Increase to 100 mg every day today.  Working well         Disorder of bone density and structure, unspecified    Overview   11/19/24: Lowest T score -1.2    11/16/22: Lowest T score -1.5 10-Year Fracture Risk: Major Osteoporotic Fracture 13.4% Hip Fracture                1.7% 7/16: -1.5 8/20: -1.6, FRAX: Major Osteoporotic Fracture: 13.3%                             Hip Fracture:                1.6%     H/O bone density study 11/19/2024 LOwest T score -1.2            Encounter for screening mammogram for breast cancer    Overview   Mamm ordered         Current Assessment & Plan   Due May 2025         Relevant Orders    BI mammo  bilateral screening tomosynthesis    Fatty liver    Overview   CT 8/24:  Mild hepatic steatosis.   FIB-4 Calculation: 1.52 at 8/24/2024  2:06 PM  FIB-4 Result Recommendations:  FIB-4 of 1.3 to 2.67 = intermediate risk of hepatic fibrosis; proceed with elastography (Fibroscan) imaging for confirmation and consider referral to hepatology  **Will discuss next appt         History of colon polyps    Overview   11/24: TA. 5 y         Hyperlipidemia, mixed    Overview   Goal LDL <130  ON statin and fish oil         Hypothyroidism, adult    Overview   Started Thyroid medication 2022  labs monitored         Impaired fasting glucose    Overview   12/23: Hba1c 5.7%  4/18/24:   4/2025 HA1C = 5.8          Insomnia    Overview   Failed OTC agents, Benadryl/Tylenol PM  Trazodone 100mg ineffective  Xanax prn effective         Current Assessment & Plan   Can try mg glycinate          Peripheral neuropathy    Overview   EMG done  On Gabapentin , Cymbalta (AE) and Elavil in past  On Lyrica, and Metanx and Biotin  On NSAIDs  Seeing Neuro         Routine adult health maintenance    Overview   Influenza Seasonal 12/1/2018, 10/1/20, 9/3/21, 10/24/22, 10/31/23  Moderna: 3/19/21, 4/17/21, 12/17/21  Tdap (Age 7+)5/29/2008, 6/25/20   Pneumovax 23 9/3/21  Prevnar 20 4/7/23  Zostavax 7/19/2014   Shingrix 9/2020, 11/20   Mamm 2016; 9/8/20; 10/21/21, 11/15/22, 5/1/24  BMD 7/16; 9/8/20; 11/16/22, 11/19/24  Cscope declined in past  PAP/HPV 7/18/14, 2018 (-)  12/29/23 Hep C (-)         Current Assessment & Plan   Annual Wellness exam completed   Preventive Health history reviewed:  Vaccines today:   Labs ordered   Mammogram ordered  Pap/HPV done  Depression Screening done  Advanced Directives Discussion Completed  Cardiovascular risk discussed and if needed, lifestyle modifications recommended, including nutritional choices, exercise, and elimination of habits contributing to risk.  We agreed on a plan to reduce the current cardiovascular  risk.  See ecalc ASCVD Risk  Plus for data discussed regarding risk and risk reduction opportunities.  Aspirin use/disuse was discussed after reviewing the updated guidelines.     1 y MW/CPE with PCP, prn          Screen for colon cancer    Overview   11/6/24  Scattered diverticulosis of mild severity in the sigmoid colon  Hemorrhoids  Normal. Performed random biopsy using biopsy forceps.  3 subcentimeter polyps were removed   5 y         Screening for multiple conditions    Overview   Depression screening completed           Screening for osteoporosis    Vitamin D deficiency    Overview   4/24: 36 on 1 tablet daily  4/25 = 46   Goal 40-50  On supplement         Current Assessment & Plan   She's not sure of the dose she's taking  Will send me a message from home  Am planning to repeat D level in 3 months as she's thinking she's taking 5000u every day          Relevant Orders    Vitamin D 25-Hydroxy,Total (for eval of Vitamin D levels)     Other Visit Diagnoses         Medicare annual wellness visit, subsequent    -  Primary      Change in nevus        follow up with derm  family hx melanoma    Relevant Orders    Referral to Dermatology      Screening for cervical cancer        Relevant Orders    THINPREP PAP TEST      Encounter for routine gynecologic examination in Medicare patient              Component      Latest Ref Rng 4/17/2025   WHITE BLOOD CELL COUNT      3.8 - 10.8 Thousand/uL 6.0    RED BLOOD CELL COUNT      3.80 - 5.10 Million/uL 4.76    HEMOGLOBIN      11.7 - 15.5 g/dL 14.2    HEMATOCRIT      35.0 - 45.0 % 44.2    MCV      80.0 - 100.0 fL 92.9    MCH      27.0 - 33.0 pg 29.8    MCHC      32.0 - 36.0 g/dL 32.1    RDW      11.0 - 15.0 % 12.4    PLATELET COUNT      140 - 400 Thousand/uL 150    MPV      7.5 - 12.5 fL 13.3 (H)    ABSOLUTE NEUTROPHILS      1,500 - 7,800 cells/uL 3,366    ABSOLUTE LYMPHOCYTES      850 - 3,900 cells/uL 2,112    ABSOLUTE MONOCYTES      200 - 950 cells/uL 360    ABSOLUTE  EOSINOPHILS      15 - 500 cells/uL 120    ABSOLUTE BASOPHILS      0 - 200 cells/uL 42    NEUTROPHILS      % 56.1    LYMPHOCYTES      % 35.2    MONOCYTES      % 6.0    EOSINOPHILS      % 2.0    BASOPHILS      % 0.7    COLOR      YELLOW  YELLOW    APPEARANCE      CLEAR  CLEAR    SPECIFIC GRAVITY      1.001 - 1.035  1.025    PH      5.0 - 8.0  6.5    GLUCOSE      NEGATIVE  NEGATIVE    BILIRUBIN      NEGATIVE  NEGATIVE    KETONES      NEGATIVE  NEGATIVE    OCCULT BLOOD      NEGATIVE  NEGATIVE    PROTEIN      NEGATIVE  NEGATIVE    NITRITE      NEGATIVE  NEGATIVE    LEUKOCYTE ESTERASE      NEGATIVE  NEGATIVE    WBC      < OR = 5 /HPF 10-20 !    RBC      < OR = 2 /HPF NONE SEEN    SQUAMOUS EPITHELIAL CELLS      < OR = 5 /HPF NONE SEEN    BACTERIA      NONE SEEN /HPF NONE SEEN    CALCIUM OXALATE CRYSTALS      NONE OR FEW /HPF MODERATE !    HYALINE CAST      NONE SEEN /LPF NONE SEEN    NOTE --    CULTURE, URINE, ROUTINE SEE NOTE    CULTURE, URINE, ROUTINE --    GLUCOSE      65 - 99 mg/dL 95    UREA NITROGEN (BUN)      7 - 25 mg/dL 18    CREATININE      0.50 - 1.05 mg/dL 0.75    EGFR      > OR = 60 mL/min/1.73m2 86    SODIUM      135 - 146 mmol/L 143    POTASSIUM      3.5 - 5.3 mmol/L 4.6    CHLORIDE      98 - 110 mmol/L 107    CARBON DIOXIDE      20 - 32 mmol/L 29    ELECTROLYTE BALANCE      7 - 17 mmol/L (calc) 7    CALCIUM      8.6 - 10.4 mg/dL 9.8    PROTEIN, TOTAL      6.1 - 8.1 g/dL 6.8    ALBUMIN      3.6 - 5.1 g/dL 4.7    BILIRUBIN, TOTAL      0.2 - 1.2 mg/dL 0.3    ALKALINE PHOSPHATASE      37 - 153 U/L 85    AST      10 - 35 U/L 19    ALT      6 - 29 U/L 14    CHOLESTEROL, TOTAL      <200 mg/dL 174    HDL CHOLESTEROL      > OR = 50 mg/dL 69    TRIGLYCERIDES      <150 mg/dL 95    LDL-CHOLESTEROL      mg/dL (calc) 86    CHOL/HDLC RATIO      <5.0 (calc) 2.5    NON HDL CHOLESTEROL      <130 mg/dL (calc) 105    HEMOGLOBIN A1c      <5.7 % 5.8 (H)    eAG (mg/dL)      mg/dL 120    eAG (mmol/L)      mmol/L 6.6    TSH       "0.40 - 4.50 mIU/L 0.58    VITAMIN D,25-OH,TOTAL,IA      30 - 100 ng/mL 46       Diarrhea  Anxiety  About a year  Seeing GI  Also therapist  [place]  Gave up daily x 6 weeks & is reintroducing  Eating smaller meals across the day     Neuropathy  Using biotin  Lyrica    Insomnia  Remeron 45 mg  Using xanax 1 mg HS also   by about 2 hours      ROS otherwise negative aside from what was mentioned above in HPI.    Vitals  /85   Pulse 92   Temp 36.6 °C (97.8 °F)   Ht 1.575 m (5' 2\")   Wt 62.8 kg (138 lb 6.4 oz)   LMP  (LMP Unknown)   SpO2 98%   BMI 25.31 kg/m²   Body mass index is 25.31 kg/m².  Physical Exam  Gen: Alert, NAD  HEENT:  Unremarkable  Neck:  No ELA  Respiratory:  Lungs CTAB  Cardiovascular:  Heart RRR  Neuro:  Gross motor and sensory intact  Skin:  No suspicious lesions present  Breast: No masses, or axillary lymphadenopathy  GYN:     Patient Care Team:  Kathryn Kang MD as PCP - General (Internal Medicine)  Kathryn Kang MD as PCP - [employer] PCP  Abundio Garcia MD as Referring Physician (Neurology)  Myles Alicea DO as Consulting Physician (Orthopaedic Surgery)  Carolina BUI MD as Surgeon (Gastroenterology)  FADI Perry as Referring Physician (Gastroenterology)     Health Risk Assessment:  Patient was asked if he/she has any difficulty performing the following activities of daily living:  Preparing nutritious food and eating? No  Grocery shopping? No  Bathing and grooming yourself? No  Getting dressed? No  Using the toilet?No  Using the phone? No  Moving around from place to place (physical ambulation)? No  Doing housework (including laundry) independently? No  Managing finances independently? No  Managing medications independently? No  Doing housework (including laundry) independently? No    Patient was asked if he/she:  Feels safe in current home environment?: Yes  Uses seatbelt? Yes  Sees the dentist regularly? Yes  Exercises " regularly: Yes  Suffers from depression, stress, anger, loneliness or social isolation, pain, suicidality? No    Dietary issues discussed: Yes  Cognitive Impairment No  Hearing difficulties: No  Visual Acuity assessed: No    What is your self-assessment of overall health status and life satisfaction? Good     5 minutes spent on SDOH screening:   Specifically Housing, Food Insecurity, Utilities and Transportatin Needs were evaluated   (See Screenings in Rooming section for documentation)  Food Insecurity: Not on file     Housing Stability: Unknown (4/24/2025)    Housing Stability Vital Sign     Unable to Pay for Housing in the Last Year: No     Number of Times Moved in the Last Year: Not on file     Homeless in the Last Year: No     Transportation Needs: No Transportation Needs (4/24/2025)    PRAPARE - Transportation     Lack of Transportation (Medical): No     Lack of Transportation (Non-Medical): No       Allergies and Medications  Fluoxetine, Niacin, Doxycycline, and Sulfa (sulfonamide antibiotics)  Current Outpatient Medications   Medication Instructions    ALPRAZolam (XANAX) 1 mg, oral, 3 times daily PRN, Take 1 tablet up to THREE TIMES DAILY AS NEEDED for anxiety    biotin 1 mg capsule 1 capsule, Daily    cholecalciferol (Vitamin D-3) 25 MCG (1000 UT) tablet 1 tablet, Daily    hydrOXYzine HCL (ATARAX) 25 mg, oral, Every 6 hours PRN    levothyroxine (SYNTHROID, LEVOXYL) 50 mcg, oral, Daily    meloxicam (MOBIC) 15 mg, oral, Daily    mirtazapine (REMERON) 45 mg, oral, Nightly    multivitamin tablet 1 tablet, Daily    pregabalin (LYRICA) 200 mg, 3 times daily    sertraline (ZOLOFT) 100 mg, oral, Daily    simvastatin (ZOCOR) 40 mg, oral, Daily    vitamin E 180 mg (400 unit) capsule 400-800 units daily       Medications and Supplements  prescribed by me and other practitioners or clinical pharmacist (such as prescriptions, OTC's, herbal therapies and supplements) were reviewed and documented in the medical record.       Active Problem List  Problem List[1]    Comprehensive Medical/Surgical/Social/Family History  Medical History[2]  Surgical History[3]  Social History     Social History Narrative    , 2 kids    Not working, babysits granchildren full time    Nonsmoker    Social ETOH    --    Family History:    M: Pancreatic CA , OP ( age 88)    F: CHF ( age 91)    S: thyroid    B:    B: Obesity, COPD/IPF, Psychiatric issues/OCD         Tobacco/Alcohol/Opioid use, as well as Illicit Drug Use was screened for/reviewed and documented in Social Documentation section of the chart and medication list as appropriate     Depression Screening  Depression screening completed using the PHQ-2 questions, with results documented in the chart/encounter (5 min spent on this).  Patient Health Questionnaire-2 Score: 0 (2025 12:57 PM)  (See also Rooming/Screening section for documentation, and/or progress note for additional information)    Cardiac Risk Assessment (15 minutes spent on this)  The 10-year ASCVD risk score (Rogelio GELLER, et al., 2019) is: 7.4%    Values used to calculate the score:      Age: 70 years      Sex: Female      Is Non- : No      Diabetic: No      Tobacco smoker: No      Systolic Blood Pressure: 125 mmHg      Is BP treated: No      HDL Cholesterol: 69 mg/dL      Total Cholesterol: 174 mg/dL    Cardiovascular risk was discussed and, if needed, lifestyle modifications recommended, including nutritional choices, exercise, and elimination of habits contributing to risk. We agreed on a plan to reduce the current cardiovascular risk based on above discussion as needed.     Aspirin use/disuse was discussed and documented in the Problem List of the medical record (under Cardiac Risk Counseling) after reviewing the updated guidelines below:  Consider low dose Aspirin ( mg) use if the benefit for cardiovascular disease prevention outweighs risk for bleeding complications.   Discussed  that in general, low dose ASA should be considered:  In patients WITHOUT prior MI/stroke/PAD (primary prevention):   a. Age <60: Use if 10-year cardiovascular disease risk >20%, with discussion of risks and benefits with patient  b. Age 60-<70: Use if 10-year cardiovascular disease risk >20% and low bleeding (e.g., gastrointestinal) risk, with discussion of risks and benefits with patient  c. Age >=70: Do not use    In patients WITH prior MI/stroke/PAD (secondary prevention):   Generally use unless extremely high bleeding (e.g., gastrointenstinal) risk, with discussion of risks and benefits with patient    Advance Directives Discussion  Advanced Care Planning (including a Living Will, Healthcare POA, as well as specific end of life choices and/or directives), was discussed with the patient and/or surrogate, voluntarily, and details of that discussion documented in the Problem List (under Advanced Directives Discussion) of the medical record.   (16 min spent discussing above)     During the course of the visit the patient was educated and counseled about age appropriate screening and preventive services.   Completed preventive screenings were documented in the chart (see Routine Health Maintenance in Problem List) and orders were placed for outstanding screenings/procedures as documented in the Assessment and Plan.    Patient Instructions (the written plan) was given to the patient at check out as part of the AVS.             [1]   Patient Active Problem List  Diagnosis    Abnormality of pancreatic duct    Anxiety    Arthritis    Current moderate episode of major depressive disorder without prior episode (Multi)    Disorder of bone density and structure, unspecified    Hepatic hemangioma    Hyperlipidemia, mixed    Hypothyroidism, adult    Lung nodule    Vitamin D deficiency    Advance directive discussed with patient    Cardiac risk counseling    H/O abnormal cervical Papanicolaou smear    H/O fracture of rib     Postmenopausal    Routine adult health maintenance    Screening for breast cancer    Screening for osteoporosis    Screening for multiple conditions    Encounter for screening mammogram for breast cancer    Esophageal ulcer    History of HPV infection    Impaired fasting glucose    Peripheral neuropathy    AGNES positive    Fibroma of left foot    Family history of pancreatic cancer    Screen for colon cancer    Menopause    Family history of melanoma    Lumbar spondylosis    Alternating constipation and diarrhea    Fatty liver    IPMN (intraductal papillary mucinous neoplasm)    Kidney cyst, acquired    Hepatic cyst    History of colon polyps    Esophageal dysmotility    Medication management    Insomnia   [2]   Past Medical History:  Diagnosis Date    Abnormal ultrasound of abdomen 11/15/2022    Comment on above: 10/21: 1. Right hepatic lobe lesion measuring up to 0.8 x 0.7cm x 0.7cm cm with imaging characteristics suggestive of a benign hemangioma.2. Previously noted left hepatic lobe lesion is not visualized in the present examination.3. Poor visualization of the pancreas.;    H/O bone density study     11/16/22: Lowest T score -1.5 10-Year Fracture Risk: Major Osteoporotic Fracture 13.4% Hip Fracture                1.7% 7/16: -1.5 8/20: -1.6, FRAX: Major Osteoporotic Fracture: 13.3%                             Hip Fracture:                1.6%    H/O bone density study 11/19/2024    LOwest T score -1.2    H/O chest x-ray 05/18/2023    normal    H/O chest x-ray 12/09/2024    normal    H/O CT scan     8/20: CT calcium score=0 POTENTIAL BUT NOT DEFINITIVE 4 MM SUBPLEURAL LEFT LOWER LOBE NONCALCIFIED NODULE VERSUS ATELECTASIS/SCAR. ACCORDING TO 2017 REVISION LUNG NODULE FOLLOW-UP GUIDELINES, EVEN IF THIS IS A HIGH RISK PATIENT (SMOKING OR MALIGNANCY HISTORY), FOLLOW-UP CHEST CT IN ONE YEAR WOULD BE OPTIONAL    H/O CT scan of abdomen 08/25/2024    Colonic diverticulosis without findings of diverticulitis.  Question  mild wall thickening of the colon versus underdistention.  Mildly thickened small bowel loops in the abdomen and pelvis c/w mild enterocoloitis. Mild hepatic steatosis.    H/O CT scan of chest     3/2021: 1. Stable pulmonary (3-4mm)nodules as above. Per patient risk factors, 12 month follow-up could be obtained as clinically indicated.Pulmonary hyperinflation with emphysematous changes and pleuroparenchymal scarring. Stable prominence of the main pancreatic duct. (4mm), compared to CT 2011. Subacute left-sided rib fractures with bony callus formation. Indeterminate hepatic lesions, 0.8-1cm.    H/O CT scan of chest 11/18/2023    Stable scattered pulmonary nodules measuring 4 mm or less in size. No new pulmonary nodule or mass    H/O diagnostic ultrasound     US RUQ: 10/21: 1. Right hepatic lobe lesion measuring up to 0.8 x 0.7cm x 0.7cm cm with imaging characteristics suggestive of a benign hemangioma. 2. Previously noted left hepatic lobe lesion is not visualized in the present examination. 3. Poor visualization of the pancreas.    H/O electromyography     H/O magnetic resonance imaging 09/26/2024    MRI abd:1.  Tubular 20 x 6 mm fluid signal intensity process pancreatic head favors a dilated pancreatic side duct branch potentially indicating site of branch IPMN. The adjacent pancreatic duct is estimated at about 3 mm. 2. Probably benign liver and kidney cyst    H/O magnetic resonance imaging of lumbar spine 05/07/2024    Mild multilevel lumbar spondylosis and facet hypertrophy with no significant spinal canal stenosis and mild scattered neural foraminal narrowing.    H/O upper GI x-ray series 11/20/2024    Mild esophageal dysmotility which may be age-related. 2. A few instances of trace spontaneous gastroesophageal reflux occurred when the patient was recumbent. No hiatal hernia otherwise identified. 3. Grossly unremarkable stomach. 4. Relatively rapid small bowel transit time of 20-25 minutes. Otherwise grossly  unremarkable morphology of the small bowel    H/O x-ray of lumbar spine 2024    Unremarkable radiographic evaluation of the lumbar spine.   [3]   Past Surgical History:  Procedure Laterality Date    CERVICAL BIOPSY  W/ LOOP ELECTRODE EXCISION       SECTION, CLASSIC      ESOPHAGOGASTRODUODENOSCOPY      HERNIA REPAIR

## 2025-04-24 NOTE — ASSESSMENT & PLAN NOTE
She's not sure of the dose she's taking  Will send me a message from home  Am planning to repeat D level in 3 months as she's thinking she's taking 5000u every day

## 2025-04-24 NOTE — ASSESSMENT & PLAN NOTE
Annual Wellness exam completed   Preventive Health history reviewed:  Vaccines today:   Labs ordered   Mammogram ordered  Pap/HPV done  Depression Screening done  Advanced Directives Discussion Completed  Cardiovascular risk discussed and if needed, lifestyle modifications recommended, including nutritional choices, exercise, and elimination of habits contributing to risk.  We agreed on a plan to reduce the current cardiovascular risk.  See ecalc ASCVD Risk  Plus for data discussed regarding risk and risk reduction opportunities.  Aspirin use/disuse was discussed after reviewing the updated guidelines.     1 y MW/CPE with PCP, prn

## 2025-05-06 DIAGNOSIS — F41.9 ANXIETY: ICD-10-CM

## 2025-05-06 RX ORDER — ALPRAZOLAM 1 MG/1
1 TABLET ORAL 3 TIMES DAILY PRN
Qty: 36 TABLET | Refills: 0 | Status: SHIPPED | OUTPATIENT
Start: 2025-05-06

## 2025-05-06 NOTE — TELEPHONE ENCOUNTER
Patient left  stating she is on her way  home from her vacation. While on vacation her sisters dog ate her bottle of Xanax. She had enough in her pill box to get her through her stay but is unable to refill until the 5/18/2025 and would like to get enough until then. She understands she will have to pay cash.

## 2025-05-09 LAB
CYTOLOGY CMNT CVX/VAG CYTO-IMP: NORMAL
HPV HR 12 DNA GENITAL QL NAA+PROBE: NEGATIVE
HPV HR GENOTYPES PNL CVX NAA+PROBE: NEGATIVE
HPV16 DNA SPEC QL NAA+PROBE: NEGATIVE
HPV18 DNA SPEC QL NAA+PROBE: NEGATIVE
LAB AP HPV GENOTYPE QUESTION: YES
LAB AP HPV HR: NORMAL
LABORATORY COMMENT REPORT: NORMAL
PATH REPORT.TOTAL CANCER: NORMAL

## 2025-05-16 ENCOUNTER — OFFICE VISIT (OUTPATIENT)
Dept: PRIMARY CARE | Facility: CLINIC | Age: 70
End: 2025-05-16
Payer: COMMERCIAL

## 2025-05-16 ENCOUNTER — HOSPITAL ENCOUNTER (OUTPATIENT)
Dept: RADIOLOGY | Facility: CLINIC | Age: 70
Discharge: HOME | End: 2025-05-16
Payer: COMMERCIAL

## 2025-05-16 VITALS
BODY MASS INDEX: 26.31 KG/M2 | DIASTOLIC BLOOD PRESSURE: 68 MMHG | SYSTOLIC BLOOD PRESSURE: 113 MMHG | HEIGHT: 62 IN | TEMPERATURE: 96.5 F | OXYGEN SATURATION: 96 % | WEIGHT: 143 LBS

## 2025-05-16 DIAGNOSIS — F51.01 PRIMARY INSOMNIA: ICD-10-CM

## 2025-05-16 DIAGNOSIS — R19.7 DIARRHEA, UNSPECIFIED TYPE: ICD-10-CM

## 2025-05-16 DIAGNOSIS — M54.6 THORACIC SPINE PAIN: ICD-10-CM

## 2025-05-16 DIAGNOSIS — R61 NIGHT SWEATS: ICD-10-CM

## 2025-05-16 DIAGNOSIS — M19.90 ARTHRITIS: ICD-10-CM

## 2025-05-16 DIAGNOSIS — R63.5 WEIGHT GAIN: ICD-10-CM

## 2025-05-16 DIAGNOSIS — M85.9 DISORDER OF BONE DENSITY AND STRUCTURE, UNSPECIFIED: ICD-10-CM

## 2025-05-16 DIAGNOSIS — F32.1 CURRENT MODERATE EPISODE OF MAJOR DEPRESSIVE DISORDER WITHOUT PRIOR EPISODE (MULTI): ICD-10-CM

## 2025-05-16 DIAGNOSIS — F41.9 ANXIETY: ICD-10-CM

## 2025-05-16 DIAGNOSIS — R73.01 IMPAIRED FASTING GLUCOSE: ICD-10-CM

## 2025-05-16 DIAGNOSIS — R19.8 ALTERNATING CONSTIPATION AND DIARRHEA: ICD-10-CM

## 2025-05-16 DIAGNOSIS — R40.4 NONSPECIFIC PAROXYSMAL SPELL: Primary | ICD-10-CM

## 2025-05-16 DIAGNOSIS — R76.8 ANA POSITIVE: ICD-10-CM

## 2025-05-16 PROBLEM — Z80.8 FAMILY HISTORY OF MELANOMA: Status: RESOLVED | Noted: 2024-04-18 | Resolved: 2025-05-16

## 2025-05-16 PROBLEM — Z80.0 FAMILY HISTORY OF PANCREATIC CANCER: Status: RESOLVED | Noted: 2024-04-18 | Resolved: 2025-05-16

## 2025-05-16 PROBLEM — Z87.19 HISTORY OF ESOPHAGEAL ULCER: Status: ACTIVE | Noted: 2023-09-02

## 2025-05-16 PROCEDURE — 1160F RVW MEDS BY RX/DR IN RCRD: CPT | Performed by: INTERNAL MEDICINE

## 2025-05-16 PROCEDURE — 99215 OFFICE O/P EST HI 40 MIN: CPT | Performed by: INTERNAL MEDICINE

## 2025-05-16 PROCEDURE — 1159F MED LIST DOCD IN RCRD: CPT | Performed by: INTERNAL MEDICINE

## 2025-05-16 PROCEDURE — 72072 X-RAY EXAM THORAC SPINE 3VWS: CPT

## 2025-05-16 PROCEDURE — G2211 COMPLEX E/M VISIT ADD ON: HCPCS | Performed by: INTERNAL MEDICINE

## 2025-05-16 PROCEDURE — 3008F BODY MASS INDEX DOCD: CPT | Performed by: INTERNAL MEDICINE

## 2025-05-16 PROCEDURE — 1036F TOBACCO NON-USER: CPT | Performed by: INTERNAL MEDICINE

## 2025-05-16 RX ORDER — OXYCODONE AND ACETAMINOPHEN 5; 325 MG/1; MG/1
1 TABLET ORAL EVERY 6 HOURS PRN
Qty: 28 TABLET | Refills: 0 | Status: SHIPPED | OUTPATIENT
Start: 2025-05-16 | End: 2025-05-23

## 2025-05-16 RX ORDER — DEXAMETHASONE 1 MG/1
1 TABLET ORAL ONCE
Qty: 1 TABLET | Refills: 0 | Status: SHIPPED | OUTPATIENT
Start: 2025-05-16 | End: 2025-05-16

## 2025-05-16 RX ORDER — MIRTAZAPINE 45 MG/1
22.75 TABLET, FILM COATED ORAL NIGHTLY
Start: 2025-05-16 | End: 2026-05-11

## 2025-05-16 NOTE — ADDENDUM NOTE
Addended by: STEPHEN TOLEDO on: 5/16/2025 05:00 PM     Modules accepted: Orders, Level of Service

## 2025-05-16 NOTE — ASSESSMENT & PLAN NOTE
Will send labs for hormones, MCAS, NET, Hypercotisolism  End consult for help  Wean down on Remeron (? Serotonin sensitivity/excess)

## 2025-05-16 NOTE — ASSESSMENT & PLAN NOTE
Lets try weaning down or off remeron (? Serotonin excess/sesnitivity/syndrome?)  Reassess need for it (Coulod nevaeh be contributing to weight gain)

## 2025-05-16 NOTE — PROGRESS NOTES
CC/HPI:   Anxiety and Diarrhea.  Pt sent [employer] MESSAGE on 5/14/25: (Copied)  she's been having ongoing problems with diarrhea/fatigue and severe anxiety, thinks its all related   Anxiety shuts her down when it occurs  Doesnt know what comes first diarrhea or anxiety  Fine right now/today  Typically occurs on weekends  ? Overtaxing body during workweek  Still getting night sweats  No palpitations  No abd pain at all  Has gained weight roughly 30 lbs over 5 yrs, breasts have grown recently, no pain just noticed the growth   Also neuropathy is back (had weaned down on her lyrica), so went back up on her lyrica    Per my note in Sept (copied:  Starts with overwhelming fatigue, tired (usually the night before or day before) followed by diarrhea, 'anxiety' (anxious feeling, doesn't want to be alone but not psychiatric reason, like going to feel like this forever)- can't tell which comes first as they are both simultaneously-, sweats and hot flashes (Vit E not helping during these episodes), breaks out in full body sweats     Started seeing a therapist and she recommended patient to see oj Daniels having the 'episodes'  Last episode was 4/6/25 and lasted 3 days  Was at her friends house and was fatigued and had a bad backache, went away on its own and came back  Has one now also  Doesnt know cause  Tylenol doesn't touch it    Saw GI 4/25 (copied)  70-year-old female presents for evaluation of intermittent diarrhea. She continues to have flares 1-2 times a month of significant diarrhea and that it can be associated with fatigue and occasional nausea and vomiting. These tend to occur when she overexerts herself such as watching her 3 grandchildren. She is working with a therapist. She underwent a colonoscopy that was negative for microscopic colitis. A tubular adenoma was removed and she will be due for repeat surveillance in 5 years. I do think a lot of this is related to underlying irritable bowel syndrome. We discussed  working with her therapist to set boundaries and take care of herself by minimizing the overexertion. I did advise her to keep a food diary to try to see if there is any foods that may trigger her symptoms or worsen them when they do occur. She did start a dairy free diet that did not seem to improve her overall symptoms. And I gave her information on the FODMAP diet. She will continue to work through these things and I will see her in follow-up in 6 months     She was taking all her meds at one time in the evening with meal  Now taking thyroid in AM  Then eats and takes mobic and vitamins  Thenin evening takes remeron and zoloft  Before bed takes zoloft and benzo    Labs:  Component      Latest Ref Rn 4/17/2025   TSH      0.40 - 4.50 mIU/L 0.58    VITAMIN D,25-OH,TOTAL,IA      30 - 100 ng/mL 46      Component      Latest Ref Rn 4/17/2025   HEMOGLOBIN A1c      <5.7 % 5.8 (H)    eAG (mg/dL)      mg/dL 120    eAG (mmol/L)      mmol/L 6.6      Component      Latest Ref Rn 4/17/2025   CHOLESTEROL, TOTAL      <200 mg/dL 174    HDL CHOLESTEROL      > OR = 50 mg/dL 69    TRIGLYCERIDES      <150 mg/dL 95    LDL-CHOLESTEROL      mg/dL (calc) 86    CHOL/HDLC RATIO      <5.0 (calc) 2.5    NON HDL CHOLESTEROL      <130 mg/dL (calc) 105      Component      Latest Ref Rn 4/17/2025   GLUCOSE      65 - 99 mg/dL 95    UREA NITROGEN (BUN)      7 - 25 mg/dL 18    CREATININE      0.50 - 1.05 mg/dL 0.75    EGFR      > OR = 60 mL/min/1.73m2 86    SODIUM      135 - 146 mmol/L 143    POTASSIUM      3.5 - 5.3 mmol/L 4.6    CHLORIDE      98 - 110 mmol/L 107    CARBON DIOXIDE      20 - 32 mmol/L 29    ELECTROLYTE BALANCE      7 - 17 mmol/L (calc) 7    CALCIUM      8.6 - 10.4 mg/dL 9.8    PROTEIN, TOTAL      6.1 - 8.1 g/dL 6.8    ALBUMIN      3.6 - 5.1 g/dL 4.7    BILIRUBIN, TOTAL      0.2 - 1.2 mg/dL 0.3    ALKALINE PHOSPHATASE      37 - 153 U/L 85    AST      10 - 35 U/L 19    ALT      6 - 29 U/L 14      Component      Latest Ref Rng  4/17/2025   WHITE BLOOD CELL COUNT      3.8 - 10.8 Thousand/uL 6.0    RED BLOOD CELL COUNT      3.80 - 5.10 Million/uL 4.76    HEMOGLOBIN      11.7 - 15.5 g/dL 14.2    HEMATOCRIT      35.0 - 45.0 % 44.2    MCV      80.0 - 100.0 fL 92.9    MCH      27.0 - 33.0 pg 29.8    MCHC      32.0 - 36.0 g/dL 32.1    RDW      11.0 - 15.0 % 12.4    PLATELET COUNT      140 - 400 Thousand/uL 150    MPV      7.5 - 12.5 fL 13.3 (H)    ABSOLUTE NEUTROPHILS      1,500 - 7,800 cells/uL 3,366    ABSOLUTE LYMPHOCYTES      850 - 3,900 cells/uL 2,112    ABSOLUTE MONOCYTES      200 - 950 cells/uL 360    ABSOLUTE EOSINOPHILS      15 - 500 cells/uL 120    ABSOLUTE BASOPHILS      0 - 200 cells/uL 42    NEUTROPHILS      % 56.1    LYMPHOCYTES      % 35.2    MONOCYTES      % 6.0    EOSINOPHILS      % 2.0    BASOPHILS      % 0.7    COLOR      YELLOW  YELLOW    APPEARANCE      CLEAR  CLEAR    SPECIFIC GRAVITY      1.001 - 1.035  1.025    PH      5.0 - 8.0  6.5    GLUCOSE      NEGATIVE  NEGATIVE    BILIRUBIN      NEGATIVE  NEGATIVE    KETONES      NEGATIVE  NEGATIVE    OCCULT BLOOD      NEGATIVE  NEGATIVE    PROTEIN      NEGATIVE  NEGATIVE    NITRITE      NEGATIVE  NEGATIVE    LEUKOCYTE ESTERASE      NEGATIVE  NEGATIVE    WBC      < OR = 5 /HPF 10-20 !    RBC      < OR = 2 /HPF NONE SEEN    SQUAMOUS EPITHELIAL CELLS      < OR = 5 /HPF NONE SEEN    BACTERIA      NONE SEEN /HPF NONE SEEN    CALCIUM OXALATE CRYSTALS      NONE OR FEW /HPF MODERATE !    HYALINE CAST      NONE SEEN /LPF NONE SEEN    NOTE --    CULTURE, URINE, ROUTINE SEE NOTE    CULTURE, URINE, ROUTINE --       Component      Latest Ref Rn 9/25/2024   Epinephrine, Ur      ug/L 5    Epinephrine, 24H Ur      1 - 14 ug/d 6    Epinephrine/Creatinine, Urine      0 - 20 ug/g CRT 7    Norepinephrine, Ur      ug/L 32    Norepinephrine/Creatinine, Urine      0 - 45 ug/g CRT 43    Norepinephrine, 24H Urine      14 - 120 ug/d 37    Dopamine, Ur      ug/L 115    Dopamine, 24H Urine      71 - 485 ug/d  132    Dopamine/Creatinine, Urine      0 - 250 ug/g     Creatinine, Urine - per volume      mg/dL 75    Creatinine, 24 Hour Urine      500 - 1400 mg/d 858    Total Volume      mL 1144    Collection period      hr 24        Component      Latest Ref Rng 9/25/2024   5-HIAA, Urine      Undefined mg/L 2.6    5-HIAA, 24H Urine      0.0 - 14.9 mg/24 hr 3.0        Component      Latest Ref Rng 9/24/2024   Calprotectin, Stool      <=49 ug/g 28      4/10/25: HBT  Impression: Negative for SIBO     Cscoep: A. COLON, ASCENDING, BIOPSY:   -- COLONIC MUCOSA WITH NO SIGNIFICANT PATHOLOGIC FINDINGS.  -- NEGATIVE FOR MICROSCOPIC COLITIS.    Assessment and Plan:  Problem List Items Addressed This Visit          Medium    AGNES positive    Overview   12/23: 1:320, homogenous  7/24: 1:160, Homogenous         Anxiety    Overview   S/p SSRI, Buspar and klonopin  8/21/24: anxiety provoked with unexplained medical symptoms; 7 day xanax RX provided  10/17/24: increase zoloft from 50 mg every day to 100 mg every day. Monitor  12/24: failed trazodone, started mirtazapine         Relevant Medications    dexAMETHasone (Decadron) 1 mg tablet    Other Relevant Orders    Cortisol    Dexamethasone    Prolactin level    Estradiol    Tryptase    FSH & LH    T4, free    T3, free    ACTH    Arthritis    Overview   DIP and CMC joints  Mobic prn         Current moderate episode of major depressive disorder without prior episode (Multi)    Overview   s/p  hospitalization 2019  at the time: [facility]: 55 -60-51 Moderate symptoms or moderate difficulty in social, occupational or school functioning  Weaned off Paxil bc became ineffective  Failed Pamelor (?)  Stopped Seroquel in 2021  Self-Stopped Buspar and Wellbutrin in 2022  Stopped Cymbalta in 2024 (AE, GI)  10/17/24: restarted zoloft 50 mg in Sept. Increase to 100 mg every day today.  Working well         Current Assessment & Plan   Lets try weaning down or off remeron (? Serotonin  excess/sesnitivity/syndrome?)  Reassess need for it (Zhao paintero be contributing to weight gain)         Disorder of bone density and structure, unspecified    Overview   11/19/24: Lowest T score -1.2    11/16/22: Lowest T score -1.5 10-Year Fracture Risk: Major Osteoporotic Fracture 13.4% Hip Fracture                1.7% 7/16: -1.5 8/20: -1.6, FRAX: Major Osteoporotic Fracture: 13.3%                             Hip Fracture:                1.6%     H/O bone density study 11/19/2024 LO[facility] T score -1.2            Impaired fasting glucose    Overview   12/23: Hba1c 5.7%  4/18/24:   4/2025 HA1C = 5.8          Relevant Medications    dexAMETHasone (Decadron) 1 mg tablet    Other Relevant Orders    Referral to Endocrinology    Cortisol    Dexamethasone    Prolactin level    Estradiol    Tryptase    FSH & LH    T4, free    T3, free    ACTH    Insomnia    Overview   Failed OTC agents, Benadryl/Tylenol PM  Trazodone 100mg ineffective  Xanax prn effective         Relevant Medications    mirtazapine (Remeron) 45 mg tablet    Nonspecific paroxysmal spell - Primary    Overview   Episodec diarrhea/anxiety/fatigue/night sweats  Workup (-) so far   8/24 CT abd/pelvis: 1. Findings suggestive of a mild enterocolitis. 2. Mild hepatic steatosis.  3. Colonic diverticulosis without findings of diverticulitis  10/17/24: less frequent now that she's started zoloft. She is scheduled upcoming for EGD/cscope  4/25: HBT: Impression: Negative for SIBO  S/p GI consult 4/25            Current Assessment & Plan   Will send labs for hormones, MCAS, NET, Hypercotisolism  End consult for help  Wean down on Remeron (? Serotonin sensitivity/excess)         Relevant Medications    dexAMETHasone (Decadron) 1 mg tablet     Other Visit Diagnoses         Thoracic spine pain        Refractory to tylenol and mobic  can try percocet    Relevant Medications    oxyCODONE-acetaminophen (Percocet) 5-325 mg tablet    Other Relevant Orders    XR thoracic  "spine 3 views      Weight gain        Decrease remeron  ? cause    Relevant Medications    dexAMETHasone (Decadron) 1 mg tablet    Other Relevant Orders    Referral to Endocrinology    Cortisol    Dexamethasone    Prolactin level    Estradiol    Tryptase    FSH & LH    T4, free    T3, free    ACTH      Night sweats        Relevant Medications    dexAMETHasone (Decadron) 1 mg tablet    Other Relevant Orders    Referral to Endocrinology    Cortisol    Dexamethasone    Prolactin level    Estradiol    Tryptase    FSH & LH    T4, free    T3, free    ACTH      Diarrhea, unspecified type        Relevant Medications    dexAMETHasone (Decadron) 1 mg tablet    Other Relevant Orders    Chromogranin A    Cortisol    Dexamethasone    Prolactin level    Estradiol    Tryptase    FSH & LH    T4, free    T3, free    ACTH          ROS otherwise negative aside from what was mentioned above in HPI.    Vitals  /68   Temp 35.8 °C (96.5 °F)   Ht 1.575 m (5' 2\")   Wt 64.9 kg (143 lb)   LMP  (LMP Unknown)   SpO2 96%   BMI 26.16 kg/m²   Body mass index is 26.16 kg/m².  Physical Exam  Gen: Alert, NAD  HEENT: Unremarkable  Respiratory:  Lungs CTAB  CV: RRR  Neuro:  Gross motor and sensory intact  Back: tender in the thoracic spine    Allergies and Medications  Fluoxetine, Niacin, Doxycycline, and Sulfa (sulfonamide antibiotics)  Current Outpatient Medications   Medication Instructions    ALPRAZolam (XANAX) 1 mg, oral, 3 times daily PRN, Take 1 tablet up to THREE TIMES DAILY AS NEEDED for anxiety    biotin 1 mg capsule 1 capsule, Daily    cholecalciferol (Vitamin D-3) 125 mcg (5,000 units) tablet 1 tablet, Daily    dexAMETHasone (DECADRON) 1 mg, oral, Once, Take at 11pm the night before the cortisol lab test    levothyroxine (SYNTHROID, LEVOXYL) 50 mcg, oral, Daily    meloxicam (MOBIC) 15 mg, oral, Daily    mirtazapine (REMERON) 22.5 mg, oral, Nightly    multivitamin tablet 1 tablet, Daily    oxyCODONE-acetaminophen (Percocet) 5-325 " "mg tablet 1 tablet, oral, Every 6 hours PRN    pregabalin (LYRICA) 200 mg, 3 times daily    sertraline (ZOLOFT) 100 mg, oral, Daily    simvastatin (ZOCOR) 40 mg, oral, Daily    vitamin E 180 mg (400 unit) capsule 400-800 units daily         Of note (used [facility] for help with this symptoms complex and this is the results of the query:  Key Symptoms & Findings:  Intermittent diarrhea, fatigue, and \"anxiety\" episodes that appear to come in flares.  Associated night sweats, full-body hot flashes, backaches, and neuropathy (improved with Lyrica).  Cannot tell if diarrhea or anxiety comes first--they usually co-occur.  Worse on weekends and possibly linked to physical overexertion.  No abdominal pain.  No improvement with dairy-free diet.  Weight gain (~30 lbs over 5 years), including breast enlargement.  Mildly elevated HbA1c (5.8%).  Normal colonoscopy, negative SIBO, normal stool calprotectin, negative for microscopic colitis.  No signs of infection or major lab derangements.    ?? Working Differential Diagnosis:  1. Autonomic Dysfunction / Dysautonomia  Pattern of fatigue + diarrhea + sweating + anxiety-like episodes (especially worsened by exertion) suggests a possible autonomic nervous system issue.  Lyrica helping the neuropathy supports a possible peripheral/autonomic neuropathy component.  Consider autonomic testing if clinically justified.  2. Functional Disorder (IBS-D with somatic overlap)  GI has already suggested IBS-D with stress/exertion as a trigger.  Episodes correlate with psychological and physiological stress, worsened by activity, and partly respond to pacing.  Could be part of a broader somatic symptom disorder or central sensitivity syndrome (includes IBS, fibromyalgia, chronic fatigue).  3. Perimenopausal or Hormonal Fluctuations  At 70, the recent breast growth, night sweats, and weight gain raise questions about unusual estrogen or prolactin activity.  Recommend checking estradiol, LH, FSH, " "prolactin, and possibly pituitary imaging if labs are abnormal.  Consider adrenal testing (cortisol, DHEAS) if symptoms fit.  4. Pheochromocytoma / Neuroendocrine Tumor (NET)  Symptoms of episodic diarrhea, anxiety, sweating and even weight gain raise this, though your labs (including 5-HIAA, catecholamines) are normal.  Not likely, but keep on distant differential if episodes worsen or new signs arise (e.g., hypertension during flares).  5. Mast Cell Activation Syndrome (MCAS)  Episodic flushing, diarrhea, fatigue, and nonspecific anxiety-type symptoms could suggest this.  May consider serum tryptase, urine histamine/metabolites, or empiric antihistamine trials if suspicion is higher.  6. Thyroid / Endocrine Abnormalities  TSH is low-normal (0.58), which could be appropriate or represent subclinical hyperthyroid state--worth repeating TSH + Free T4/T3 if symptoms worsen.    ?? Psychiatric Overlay  She’s in therapy and does not have clear psychiatric pathology, but:  The \"anxiety\" seems more physiological than psychological.  Episodes with panic-like features and associated systemic symptoms (sweating, diarrhea) may be panic disorder-like or somatic symptom amplification.  Continue therapy and coping strategies, especially with an energy pacing approach.    ?? Recommended Next Steps:  Labs/Imaging:  Free T4, Free T3, TSH (recheck)  Prolactin, Estradiol, FSH/LH  Cortisol (AM), maybe ACTH  Consider tryptase (for MCAS)  Consider chromogranin A (optional--nonspecific NET marker)  Consults:  Endocrinology (already recommended by therapist--appropriate)  Neurology if neuropathy/autonomic dysfunction is suspected  Consider Autonomic Testing (e.g., tilt table, QSART)  Symptom Tracking:  Episode diary (food, mood, activity, sleep, physical symptoms) can help identify triggers/patterns.  Continue working with GI and therapist.  Encourage consistent pacing and avoidance of overexertion.    Summary Diagnosis " Considerations:  Likely IBS-D with stress-induced flares and functional overlay  Possible autonomic dysfunction, worsened by activity  Rule out hormonal / neuroendocrine etiology due to systemic symptoms (sweats, breast changes, weight gain)  Continue supportive therapy, pacing, and monitoring--symptom diary and endocrine follow-up essential        Time spent prepping/preparing for visit as well as pre/post-visit charting: 10 minutes  Time spent directly with Patient: 30 minutes  Total Time: 40 minutes which included preparing to see the patient, face-to-face patient care, completing clinical documentation, obtaining and/or reviewing separately obtained history, performing a medically appropriate examination, ordering medications, tests, or procedures and discussing the plan of care..

## 2025-05-20 ENCOUNTER — HOSPITAL ENCOUNTER (OUTPATIENT)
Dept: RADIOLOGY | Facility: CLINIC | Age: 70
Discharge: HOME | End: 2025-05-20
Payer: COMMERCIAL

## 2025-05-20 VITALS — BODY MASS INDEX: 26.31 KG/M2 | WEIGHT: 143 LBS | HEIGHT: 62 IN

## 2025-05-20 DIAGNOSIS — Z12.31 ENCOUNTER FOR SCREENING MAMMOGRAM FOR BREAST CANCER: ICD-10-CM

## 2025-05-20 PROCEDURE — 77067 SCR MAMMO BI INCL CAD: CPT | Performed by: STUDENT IN AN ORGANIZED HEALTH CARE EDUCATION/TRAINING PROGRAM

## 2025-05-20 PROCEDURE — 77063 BREAST TOMOSYNTHESIS BI: CPT | Performed by: STUDENT IN AN ORGANIZED HEALTH CARE EDUCATION/TRAINING PROGRAM

## 2025-05-20 PROCEDURE — 77067 SCR MAMMO BI INCL CAD: CPT

## 2025-05-22 DIAGNOSIS — R92.8 ABNORMAL MAMMOGRAM: Primary | ICD-10-CM

## 2025-05-24 LAB
ACTH PLAS-MCNC: 5 PG/ML (ref 6–50)
CORTIS SERPL-MCNC: 1 MCG/DL
DEXAMETHASONE SERPL-MCNC: NORMAL NG/DL
ESTRADIOL SERPL-MCNC: 16 PG/ML
FSH SERPL-ACNC: 85.4 MIU/ML
LH SERPL-ACNC: 21.7 MIU/ML
PROLACTIN SERPL-MCNC: 3.9 NG/ML
T3FREE SERPL-MCNC: 3.3 PG/ML (ref 2.3–4.2)
T4 FREE SERPL-MCNC: 1 NG/DL (ref 0.8–1.8)
TRYPTASE SERPL-MCNC: 6.2 MCG/L

## 2025-05-29 LAB — CGA SERPL-MCNC: 204 NG/ML

## 2025-05-31 LAB
ACTH PLAS-MCNC: 5 PG/ML (ref 6–50)
CORTIS SERPL-MCNC: 1 MCG/DL
DEXAMETHASONE SERPL-MCNC: 327 NG/DL
ESTRADIOL SERPL-MCNC: 16 PG/ML
FSH SERPL-ACNC: 85.4 MIU/ML
LH SERPL-ACNC: 21.7 MIU/ML
PROLACTIN SERPL-MCNC: 3.9 NG/ML
T3FREE SERPL-MCNC: 3.3 PG/ML (ref 2.3–4.2)
T4 FREE SERPL-MCNC: 1 NG/DL (ref 0.8–1.8)
TRYPTASE SERPL-MCNC: 6.2 MCG/L

## 2025-06-02 ENCOUNTER — APPOINTMENT (OUTPATIENT)
Dept: ORTHOPEDIC SURGERY | Facility: CLINIC | Age: 70
End: 2025-06-02
Payer: COMMERCIAL

## 2025-06-02 DIAGNOSIS — F41.9 ANXIETY: ICD-10-CM

## 2025-06-02 RX ORDER — ALPRAZOLAM 1 MG/1
1 TABLET ORAL 3 TIMES DAILY PRN
Qty: 36 TABLET | Refills: 0 | Status: SHIPPED | OUTPATIENT
Start: 2025-06-02

## 2025-06-17 ENCOUNTER — HOSPITAL ENCOUNTER (OUTPATIENT)
Dept: RADIOLOGY | Facility: HOSPITAL | Age: 70
Discharge: HOME | End: 2025-06-17
Payer: COMMERCIAL

## 2025-06-17 DIAGNOSIS — R92.8 ABNORMAL MAMMOGRAM: ICD-10-CM

## 2025-06-17 PROCEDURE — G0279 TOMOSYNTHESIS, MAMMO: HCPCS | Mod: LEFT SIDE | Performed by: RADIOLOGY

## 2025-06-17 PROCEDURE — 77065 DX MAMMO INCL CAD UNI: CPT | Mod: LEFT SIDE | Performed by: RADIOLOGY

## 2025-06-17 PROCEDURE — 77061 BREAST TOMOSYNTHESIS UNI: CPT | Mod: LT

## 2025-06-23 ENCOUNTER — APPOINTMENT (OUTPATIENT)
Dept: PRIMARY CARE | Facility: CLINIC | Age: 70
End: 2025-06-23
Payer: COMMERCIAL

## 2025-06-23 DIAGNOSIS — F41.9 ANXIETY: ICD-10-CM

## 2025-06-23 DIAGNOSIS — Z79.899 MEDICATION MANAGEMENT: ICD-10-CM

## 2025-06-23 DIAGNOSIS — G47.00 INSOMNIA, UNSPECIFIED TYPE: Primary | ICD-10-CM

## 2025-06-23 PROCEDURE — 1159F MED LIST DOCD IN RCRD: CPT | Performed by: NURSE PRACTITIONER

## 2025-06-23 PROCEDURE — 99213 OFFICE O/P EST LOW 20 MIN: CPT | Performed by: NURSE PRACTITIONER

## 2025-06-23 RX ORDER — ALPRAZOLAM 1 MG/1
1 TABLET ORAL 3 TIMES DAILY PRN
Qty: 36 TABLET | Refills: 0 | Status: SHIPPED | OUTPATIENT
Start: 2025-06-23

## 2025-06-23 ASSESSMENT — ANXIETY QUESTIONNAIRES
2. NOT BEING ABLE TO STOP OR CONTROL WORRYING: NOT AT ALL
GAD7 TOTAL SCORE: 1
4. TROUBLE RELAXING: NOT AT ALL
IF YOU CHECKED OFF ANY PROBLEMS ON THIS QUESTIONNAIRE, HOW DIFFICULT HAVE THESE PROBLEMS MADE IT FOR YOU TO DO YOUR WORK, TAKE CARE OF THINGS AT HOME, OR GET ALONG WITH OTHER PEOPLE: NOT DIFFICULT AT ALL
3. WORRYING TOO MUCH ABOUT DIFFERENT THINGS: NOT AT ALL
7. FEELING AFRAID AS IF SOMETHING AWFUL MIGHT HAPPEN: NOT AT ALL
1. FEELING NERVOUS, ANXIOUS, OR ON EDGE: SEVERAL DAYS
6. BECOMING EASILY ANNOYED OR IRRITABLE: NOT AT ALL
5. BEING SO RESTLESS THAT IT IS HARD TO SIT STILL: NOT AT ALL

## 2025-06-23 NOTE — PROGRESS NOTES
An interactive audio and video telecommunication system which permits real time communications between the patient (at the originating site) and provider (at the distant site) was utilized to provide this telehealth service.  Verbal consent was requested and obtained from the patient for this telehealth visit.     Controlled Substance Visit:  I have personally reviewed the [place] report and have considered the risks of abuse, dependence, addiction and diversion and I believe that it is clinically appropriate for the patient to be prescribed this medication.    Subjective   Patient ID: Elena Bueno is a 70 y.o. female who presents for Virtual Visit.    Xanax  Taking it daily at night for sleep  Only take it 3 x a day if having her episodes/flare  Still unknown cause of her symptoms  Seeing endo and gi soon        Review of Systems  ROS completely negative except what was mentioned in the HPI.  Problem List, surgical, social, and family histories which were reviewed and updated as necessary within the EMR. I also personally reviewed the notes, labs, and imaging that pertained to what was documented or discussed in the HPI.    Objective   Physical Exam  Vitals and nursing note reviewed.   Constitutional:       Appearance: Normal appearance.   Pulmonary:      Effort: Pulmonary effort is normal.      Comments: Speaking in complete sentences  Neurological:      General: No focal deficit present.      Mental Status: She is alert and oriented to person, place, and time. Mental status is at baseline.   Psychiatric:         Mood and Affect: Mood normal.         Behavior: Behavior normal.         Thought Content: Thought content normal.         Judgment: Judgment normal.         LMP  (LMP Unknown)     Assessment/Plan    Problem List Items Addressed This Visit       Anxiety    Overview   S/p SSRI, Buspar and klonopin  8/21/24: anxiety provoked with unexplained medical symptoms; 7 day xanax RX provided  10/17/24: increase zoloft  from 50 mg every day to 100 mg every day. Monitor  12/24: failed trazodone, started mirtazapine         Relevant Medications    ALPRAZolam (Xanax) 1 mg tablet    Other Relevant Orders    Follow Up In [facility] - PCP - Established    Insomnia - Primary    Overview   Failed OTC agents, Benadryl/Tylenol PM  Trazodone 100mg ineffective  Xanax prn effective         Medication management      Is the patient prescribed a combination of a benzodiazepine and opioid?  No    Last Urine Drug Screen / ordered today: No  Recent Results (from the past 8760 hours)   Confirmation Opiate/Opioid/Benzo Prescription Compliance    Collection Time: 12/09/24  4:06 PM   Result Value Ref Range    Clonazepam <25 <25 ng/mL    7-Aminoclonazepam <25 <25 ng/mL    Alprazolam <25 <25 ng/mL    Alpha-Hydroxyalprazolam <25 <25 ng/mL    Midazolam <25 <25 ng/mL    Alpha-Hydroxymidazolam <25 <25 ng/mL    Chlordiazepoxide <25 <25 ng/mL    Diazepam <25 <25 ng/mL    Nordiazepam <25 <25 ng/mL    Temazepam <25 <25 ng/mL    Oxazepam <25 <25 ng/mL    Lorazepam <25 <25 ng/mL    Methadone <25 <25 ng/mL    EDDP <25 <25 ng/mL    6-Acetylmorphine <25 <25 ng/mL    Codeine <50 <50 ng/mL    Hydrocodone <25 <25 ng/mL    Hydromorphone <25 <25 ng/mL    Morphine  <50 <50 ng/mL    Norhydrocodone <25 <25 ng/mL    Noroxycodone <25 <25 ng/mL    Oxycodone <25 <25 ng/mL    Oxymorphone <25 <25 ng/mL    Fentanyl <2.5 <2.5 ng/mL    Norfentanyl <2.5 <2.5 ng/mL    Tramadol <50 <50 ng/mL    O-Desmethyltramadol <50 <50 ng/mL    Zolpidem <25 <25 ng/mL    Zolpidem Metabolite (ZCA) <25 <25 ng/mL   Screen Opiate/Opioid/Benzo Prescription Compliance    Collection Time: 12/09/24  4:06 PM   Result Value Ref Range    Creatinine, Urine Random 31.6 20.0 - 320.0 mg/dL    Amphetamine Screen, Urine Presumptive Negative Presumptive Negative    Barbiturate Screen, Urine Presumptive Negative Presumptive Negative    Cannabinoid Screen, Urine Presumptive Negative Presumptive Negative     Cocaine Metabolite Screen, Urine Presumptive Negative Presumptive Negative    PCP Screen, Urine Presumptive Negative Presumptive Negative     Results are as expected.     Controlled Substance Agreement:  Date of the Last Agreement: 2024  I have reviewed each line item on the Controlled Substance Agreement including, but not limited to, the benefits, risks, and alternatives to treatment with a Controlled Substance medication(s). The patient has verbalized understanding and signed the agreement.  LMP  (LMP Unknown)     Benzodiazepines:  What is the patient's goal of therapy? Anxiety   Is this being achieved with current treatment? Yes     TANYA-7:  Over the last 2 weeks, how often have you been bothered by any of the following problems?  Feeling nervous, anxious, or on edge: 1  Not being able to stop or control worryin  Worrying too much about different things: 0  Trouble relaxin  Being so restless that it is hard to sit still: 0  Becoming easily annoyed or irritable: 0  Feeling afraid as if something awful might happen: 0  TANYA-7 Total Score: 1        Activities of Daily Living:   Is your overall impression that this patient is benefiting (symptom reduction outweighs side effects) from benzodiazepine therapy? Yes     1. Physical Functioning: Better  2. Family Relationship: Better  3. Social Relationship: Better  4. Mood: Better  5. Sleep Patterns: Better  6. Overall Function: Better

## 2025-07-03 ENCOUNTER — APPOINTMENT (OUTPATIENT)
Age: 70
End: 2025-07-03
Payer: COMMERCIAL

## 2025-07-11 DIAGNOSIS — F41.9 ANXIETY: ICD-10-CM

## 2025-07-11 RX ORDER — ALPRAZOLAM 1 MG/1
1-2 TABLET ORAL 3 TIMES DAILY PRN
Qty: 180 TABLET | Refills: 0 | Status: SHIPPED | OUTPATIENT
Start: 2025-07-11

## 2025-07-11 RX ORDER — ALPRAZOLAM 1 MG/1
1-2 TABLET ORAL 3 TIMES DAILY PRN
Qty: 180 TABLET | Refills: 0 | Status: SHIPPED | OUTPATIENT
Start: 2025-07-11 | End: 2025-07-11

## 2025-07-11 RX ORDER — ALPRAZOLAM 1 MG/1
1-2 TABLET ORAL 3 TIMES DAILY PRN
Qty: 180 TABLET | Refills: 0 | Status: SHIPPED | OUTPATIENT
Start: 2025-07-11 | End: 2025-07-11 | Stop reason: SDUPTHER

## 2025-07-14 ENCOUNTER — TELEPHONE (OUTPATIENT)
Dept: PRIMARY CARE | Facility: CLINIC | Age: 70
End: 2025-07-14
Payer: COMMERCIAL

## 2025-07-24 DIAGNOSIS — E55.9 VITAMIN D DEFICIENCY: ICD-10-CM

## 2025-08-31 ENCOUNTER — PATIENT MESSAGE (OUTPATIENT)
Dept: PRIMARY CARE | Facility: CLINIC | Age: 70
End: 2025-08-31
Payer: COMMERCIAL

## 2025-08-31 DIAGNOSIS — R25.1 TREMOR: Primary | ICD-10-CM

## 2025-09-03 ENCOUNTER — APPOINTMENT (OUTPATIENT)
Dept: NEUROLOGY | Facility: HOSPITAL | Age: 70
End: 2025-09-03
Payer: MEDICARE

## 2025-09-05 DIAGNOSIS — M19.90 ARTHRITIS: ICD-10-CM

## 2025-09-05 DIAGNOSIS — E78.2 HYPERLIPIDEMIA, MIXED: ICD-10-CM

## 2025-09-05 RX ORDER — MELOXICAM 15 MG/1
15 TABLET ORAL DAILY
Qty: 90 TABLET | Refills: 3 | Status: SHIPPED | OUTPATIENT
Start: 2025-09-05

## 2025-09-05 RX ORDER — SIMVASTATIN 40 MG/1
40 TABLET, FILM COATED ORAL DAILY
Qty: 90 TABLET | Refills: 3 | Status: SHIPPED | OUTPATIENT
Start: 2025-09-05

## 2025-09-06 LAB
25(OH)D3+25(OH)D2 SERPL-MCNC: 63 NG/ML (ref 30–100)
ALBUMIN SERPL-MCNC: 4.6 G/DL (ref 3.6–5.1)
ALP SERPL-CCNC: 59 U/L (ref 37–153)
ALT SERPL-CCNC: 16 U/L (ref 6–29)
AMMONIA PLAS-SCNC: 35 UMOL/L
ANION GAP SERPL CALCULATED.4IONS-SCNC: 9 MMOL/L (CALC) (ref 7–17)
AST SERPL-CCNC: 22 U/L (ref 10–35)
BILIRUB SERPL-MCNC: 0.4 MG/DL (ref 0.2–1.2)
BUN SERPL-MCNC: 23 MG/DL (ref 7–25)
CALCIUM SERPL-MCNC: 10.5 MG/DL (ref 8.6–10.4)
CHLORIDE SERPL-SCNC: 104 MMOL/L (ref 98–110)
CO2 SERPL-SCNC: 28 MMOL/L (ref 20–32)
CREAT SERPL-MCNC: 0.99 MG/DL (ref 0.5–1.05)
EGFRCR SERPLBLD CKD-EPI 2021: 62 ML/MIN/1.73M2
GLUCOSE SERPL-MCNC: 95 MG/DL (ref 65–139)
LAMOTRIGINE SERPL-MCNC: NORMAL UG/ML
MAGNESIUM SERPL-MCNC: 2.3 MG/DL (ref 1.5–2.5)
POTASSIUM SERPL-SCNC: 4.5 MMOL/L (ref 3.5–5.3)
PROT SERPL-MCNC: 6.9 G/DL (ref 6.1–8.1)
SODIUM SERPL-SCNC: 141 MMOL/L (ref 135–146)

## 2025-09-10 ENCOUNTER — APPOINTMENT (OUTPATIENT)
Facility: CLINIC | Age: 70
End: 2025-09-10
Payer: COMMERCIAL

## 2025-09-23 ENCOUNTER — APPOINTMENT (OUTPATIENT)
Dept: PRIMARY CARE | Facility: CLINIC | Age: 70
End: 2025-09-23
Payer: COMMERCIAL

## 2025-10-21 ENCOUNTER — APPOINTMENT (OUTPATIENT)
Dept: GASTROENTEROLOGY | Facility: CLINIC | Age: 70
End: 2025-10-21
Payer: COMMERCIAL

## 2025-11-04 ENCOUNTER — APPOINTMENT (OUTPATIENT)
Dept: ENDOCRINOLOGY | Facility: CLINIC | Age: 70
End: 2025-11-04
Payer: COMMERCIAL

## 2025-12-29 ENCOUNTER — APPOINTMENT (OUTPATIENT)
Dept: ENDOCRINOLOGY | Facility: CLINIC | Age: 70
End: 2025-12-29
Payer: COMMERCIAL

## 2026-05-19 ENCOUNTER — APPOINTMENT (OUTPATIENT)
Dept: PRIMARY CARE | Facility: CLINIC | Age: 71
End: 2026-05-19
Payer: COMMERCIAL